# Patient Record
Sex: FEMALE | Race: WHITE | NOT HISPANIC OR LATINO | Employment: FULL TIME | URBAN - METROPOLITAN AREA
[De-identification: names, ages, dates, MRNs, and addresses within clinical notes are randomized per-mention and may not be internally consistent; named-entity substitution may affect disease eponyms.]

---

## 2017-03-10 ENCOUNTER — OFFICE VISIT (OUTPATIENT)
Dept: FAMILY MEDICINE | Facility: CLINIC | Age: 67
End: 2017-03-10
Payer: COMMERCIAL

## 2017-03-10 ENCOUNTER — RADIANT APPOINTMENT (OUTPATIENT)
Dept: GENERAL RADIOLOGY | Facility: CLINIC | Age: 67
End: 2017-03-10
Attending: PHYSICIAN ASSISTANT
Payer: COMMERCIAL

## 2017-03-10 VITALS
OXYGEN SATURATION: 95 % | WEIGHT: 158 LBS | TEMPERATURE: 100.5 F | BODY MASS INDEX: 29.83 KG/M2 | HEIGHT: 61 IN | DIASTOLIC BLOOD PRESSURE: 70 MMHG | SYSTOLIC BLOOD PRESSURE: 126 MMHG | HEART RATE: 112 BPM | RESPIRATION RATE: 20 BRPM

## 2017-03-10 DIAGNOSIS — R50.9 FEVER, UNSPECIFIED: ICD-10-CM

## 2017-03-10 DIAGNOSIS — R91.8 PULMONARY NODULES: ICD-10-CM

## 2017-03-10 DIAGNOSIS — J10.1 INFLUENZA B: Primary | ICD-10-CM

## 2017-03-10 LAB
FLUAV+FLUBV AG SPEC QL: ABNORMAL
FLUAV+FLUBV AG SPEC QL: NEGATIVE
SPECIMEN SOURCE: ABNORMAL

## 2017-03-10 PROCEDURE — 87804 INFLUENZA ASSAY W/OPTIC: CPT | Performed by: PHYSICIAN ASSISTANT

## 2017-03-10 PROCEDURE — 71020 XR CHEST 2 VW: CPT

## 2017-03-10 PROCEDURE — 99213 OFFICE O/P EST LOW 20 MIN: CPT | Performed by: PHYSICIAN ASSISTANT

## 2017-03-10 RX ORDER — BENZONATATE 100 MG/1
100 CAPSULE ORAL 3 TIMES DAILY PRN
Qty: 16 CAPSULE | Refills: 0 | Status: SHIPPED | OUTPATIENT
Start: 2017-03-10 | End: 2017-06-24

## 2017-03-10 RX ORDER — OSELTAMIVIR PHOSPHATE 75 MG/1
75 CAPSULE ORAL 2 TIMES DAILY
Qty: 10 CAPSULE | Refills: 0 | Status: SHIPPED | OUTPATIENT
Start: 2017-03-10 | End: 2017-06-24

## 2017-03-10 ASSESSMENT — ENCOUNTER SYMPTOMS
FEVER: 1
HEADACHES: 0
ABDOMINAL PAIN: 0
CHILLS: 1
MYALGIAS: 1
SHORTNESS OF BREATH: 0
NAUSEA: 0
VOMITING: 0
COUGH: 1
DIARRHEA: 0
FOCAL WEAKNESS: 0

## 2017-03-10 NOTE — PROGRESS NOTES
HPI    SUBJECTIVE:                                                    Marciano Akins is a 66 year old female who presents to clinic today for the following health issues:      RESPIRATORY SYMPTOMS      Duration: 2 days    Description  cough, congestion, fever, chills, myalgias, fatigue/malaise and hoarse voice    Severity: moderate    Accompanying signs and symptoms: None    History (predisposing factors):  none    Precipitating or alleviating factors: None    Therapies tried and outcome:  Sudafed- not effective, ibuprofen     Additional complaints: None    HPI additional notes:  Pt also c/o mid chest pain when coughing only, which is sore to the touch. Denies abd pain, N/V/D, sore throat, rash, SOB, and any other symptoms. No sick contacts.       Chart Review:  History   Smoking Status     Never Smoker   Smokeless Tobacco     Never Used     PHQ-9 SCORE 9/16/2014 10/30/2015   Total Score 3 -   Total Score - 3     No flowsheet data found.    Patient Active Problem List   Diagnosis     Health Care Home     Hyperlipidemia with target LDL less than 100     Pulmonary nodules     History reviewed. No pertinent past surgical history.  Problem list, Medication list, Allergies, Medical/Social/Surg hx reviewed in [employer], updated as appropriate.        Review of Systems   Constitutional: Positive for chills, fever and malaise/fatigue.   HENT: Positive for congestion.    Respiratory: Positive for cough. Negative for shortness of breath.    Cardiovascular: Positive for chest pain (w/ coughing).   Gastrointestinal: Negative for abdominal pain, diarrhea, nausea and vomiting.   Musculoskeletal: Positive for myalgias.   Skin: Negative for rash.   Neurological: Negative for focal weakness and headaches.   All other systems reviewed and are negative.        Physical Exam   Constitutional: She is oriented to person, place, and time and well-developed, well-nourished, and in no distress.   HENT:   Head: Normocephalic and atraumatic.  "  Right Ear: Tympanic membrane, external ear and ear canal normal.   Left Ear: Tympanic membrane, external ear and ear canal normal.   Nose: Mucosal edema present.   Mouth/Throat: Uvula is midline, oropharynx is clear and moist and mucous membranes are normal.   Cardiovascular: Normal rate, regular rhythm and normal heart sounds.    Pulmonary/Chest: Effort normal and breath sounds normal. She exhibits tenderness.       Musculoskeletal: Normal range of motion.   Neurological: She is alert and oriented to person, place, and time. Gait normal.   Skin: Skin is warm and dry.   Nursing note and vitals reviewed.    Vital Signs  /70  Pulse 112  Temp 100.5  F (38.1  C) (Tympanic)  Resp 20  Ht 5' 1.25\" (1.556 m)  Wt 158 lb (71.7 kg)  SpO2 95%  BMI 29.61 kg/m2   Body mass index is 29.61 kg/(m^2).    Diagnostic Test Results:  Influenza A/B Agn Specimen Nasal    Final 03/10/2017  3:46    Influenza A Negative  NEG  Final 03/10/2017  3:46    Influenza B  (A) NEG  Final 03/10/2017  3:46    Positive    Test results must be correlated with clinical data. If necessary, results    should be confirmed by a molecular assay or viral culture.        Study Result   CHEST TWO VIEWS 3/10/2017 4:07 PM      HISTORY: Fever.     COMPARISON: 11/26/2004         IMPRESSION: There is a faint 5-6 mm nodular density projected over the  right upper lobe and right seventh posterior rib. This is seen only on  the PA view and could be within the lung or the rib. This could be  further evaluated with CT, particularly if the patient is in a  high-risk category. Degenerative changes in thoracic spine. The  remainder the chest is negative.          ASSESSMENT/PLAN:                                                        ICD-10-CM    1. Influenza B J10.1 oseltamivir (TAMIFLU) 75 MG capsule     benzonatate (TESSALON) 100 MG capsule   2. Pulmonary nodules R91.8 CT Chest w/o Contrast   Lungs CTAB, nontoxic appearance. Temp 100.5 F. No acute " infiltrate on CXR. Flu positive, Rx Tamiflu & Tessalon perles.    5-6 mm possible pulmonary nodule (only seen on PA view) per radiologist. Pt informed of this, CT chest ordered. Advised regular monitoring for next 2 years.    I have discussed any lab or imaging results, the patient's diagnosis, and my plan of treatment with the patient and/or family. Patient is aware to come back in if with worsening symptoms or if no relief despite treatment plan.  Patient voiced understanding and had no further questions.       Follow Up: Return for Routine Visit.    OMAR Schaefer, PA-C  [facility]

## 2017-03-10 NOTE — NURSING NOTE
"Chief Complaint   Patient presents with     URI       Initial /70  Pulse 112  Temp 100.5  F (38.1  C) (Tympanic)  Resp 20  Ht 5' 1.25\" (1.556 m)  Wt 158 lb (71.7 kg)  SpO2 95%  BMI 29.61 kg/m2 Estimated body mass index is 29.61 kg/(m^2) as calculated from the following:    Height as of this encounter: 5' 1.25\" (1.556 m).    Weight as of this encounter: 158 lb (71.7 kg).  Medication Reconciliation: complete     Melinda Dillon CMA    "

## 2017-03-10 NOTE — PROGRESS NOTES
"  SUBJECTIVE:                                                    Marciano Akins is a 66 year old female who presents to clinic today for the following health issues:  {Provider please address medication reconciliation discrepancies--rooming staff please delete if no med/rec issues}    RESPIRATORY SYMPTOMS      Duration: 3 days    Description  cough, fever, chills, fatigue/malaise and hoarse voice    Severity: moderate    Accompanying signs and symptoms: None    History (predisposing factors):  none    Precipitating or alleviating factors: None    Therapies tried and outcome:  oral decongestant- not effective, ibuprofen       {additional problems for provider to add:263790}    Problem list and histories reviewed & adjusted, as indicated.  Additional history: {NONE - AS DOCUMENTED:321788::\"as documented\"}    {HIST REVIEW/ LINKS 2:572734}    Reviewed and updated as needed this visit by clinical staff  Tobacco  Allergies  Meds  Med Hx  Surg Hx  Fam Hx  Soc Hx      Reviewed and updated as needed this visit by Provider         {PROVIDER CHARTING PREFERENCE:895061}    "

## 2017-03-10 NOTE — PATIENT INSTRUCTIONS
Influenza  Influenza ( the flu ) is an infection that affects your respiratory tract (the mouth, nose, and lungs, and the passages between them). Unlike a cold, the flu can make you very ill. And it can lead to pneumonia, a serious lung infection. For some people, especially older adults, young children, and people with certain chronic conditions, the flu can have serious complications and even be fatal.  What Are the Risk Factors for the Flu?     Viruses that cause influenza spread through the air in droplets when someone who has the flu coughs, sneezes, laughs, or talks.   Anyone can get the flu. But you re more likely to become infected if you:    Have a weakened immune system.    Work in a health care setting where you may be exposed to flu germs.    Live or work with someone who has the flu.    Haven t received an annual flu shot.  How Does the Flu Spread?  The flu is caused by viruses. The viruses spread through the air in droplets when someone who has the flu coughs, sneezes, laughs, or talks. You can become infected when you inhale these viruses directly. You can also become infected when you touch a surface on which the droplets have landed and then transfer the germs to your eyes, nose, or mouth. Touching used tissues, or sharing utensils, drinking glasses, or a toothbrush with an infected person can expose you to flu viruses, too.  What Are the Symptoms of the Flu?  Flu symptoms tend to come on quickly and may last a few days to a few weeks. They include:    Fever usually higher than 101 F  (38.3 C) and chills    Sore throat and headache    Dry cough    Runny nose    Tiredness and weakness    Muscle aches  Factors That Can Make Flu Worse  For some people, the flu can be very serious. The risk of complications is greater for:    Children under age 5.    Adults 65 years of age and older.    People with a chronic illness, such as diabetes or heart, kidney, or lung disease.    People who live in a nursing  home or long-term care facility.   How Is the Flu Treated?  Influenza usually improves after 7 days or so. In some cases, your health care provider may prescribe an antiviral medication. This may help you get well sooner. For the medication to help, you need to take it as soon as possible (ideally within 48 hours) after your symptoms start. If you develop pneumonia or other serious illness, hospital care may be needed.  Easing Flu Symptoms    Drink lots of fluids such as water, juice, and warm soup. A good rule is to drink enough so that you urinate your normal amount.    Get plenty of rest.    Ask your health care provider what to take for fever and pain.    Call your provider if your fever rises over 101 F (38.3 C) or you become dizzy, lightheaded, or short of breath.  Taking Steps to Protect Others    Wash your hands often, especially after coughing or sneezing. Or, clean your hands with an alcohol-based hand  containing at least 60 percent alcohol.    Cough or sneeze into a tissue. Then throw the tissue away and wash your hands. If you don t have a tissue, cough and sneeze into the crook of your elbow.    Stay home until at least 24 hours after you no longer have a fever or chills. Be sure the fever isn t being hidden by fever-reducing medication.    Don t share food, utensils, drinking glasses, or a toothbrush with others.    Ask your health care provider if others in your household should receive antiviral medication to help them avoid infection.  How Can the Flu Be Prevented?    One of the best ways to avoid the flu is to get a flu vaccination each year. Viruses that cause the flu change from year to year. For that reason, doctors recommend getting the flu vaccine each year, as soon as it's available in your area. The vaccine may be given as a shot or as a nasal spray. Your health care provider can tell you which vaccine is right for you.    Wash your hands often. Frequent handwashing is a proven way  to help prevent infection.    Carry an alcohol-based hand gel containing at least 60 percent alcohol. Use it when you don t have access to soap and water. Then wash your hands as soon as you can.    Avoid touching your eyes, nose, and mouth.    At home and work, clean phones, computer keyboards, and toys often with disinfectant wipes.    If possible, avoid close contact with others who have the flu or symptoms of the flu.  Handwashing Tips  Handwashing is one of the best ways to prevent many common infections. If you re caring for or visiting someone with the flu, wash your hands each time you enter and leave the room. Follow these steps:    Use warm water and plenty of soap. Rub your hands together well.    Clean the whole hand, under your nails, between your fingers, and up the wrists.    Wash for at least 15 seconds.    Rinse, letting the water run down your fingers, not up your wrists.    Dry your hands well. Use a paper towel to turn off the faucet and open the door.  Using Alcohol-Based Hand   Alcohol-based hand  are also a good choice. Use them when you don t have access to soap and water. Follow these steps:    Squeeze about a tablespoon of gel into the palm of one hand.    Rub your hands together briskly, cleaning the backs of your hands, the palms, between your fingers, and up the wrists.    Rub until the gel is gone and your hands are completely dry.  Preventing Influenza in Healthcare Settings  The flu is a special concern for people in hospitals and long-term care facilities. To help prevent the spread of flu, many hospitals and nursing homes take these steps:    Health care providers wash their hands or use an alcohol-based hand  before and after treating each patient.    People with the flu have private rooms and bathrooms or share a room with someone with the same infection.    High-risk patients who don t have the flu are encouraged to get the flu and pneumonia  vaccines.    All health care workers are encouraged or required to get flu shots.        5969-7932 [employer]. [address] [place] [address], [place] [address]. All rights reserved. This information is not intended as a substitute for professional medical care. Always follow your healthcare professional's instructions.

## 2017-03-10 NOTE — MR AVS SNAPSHOT
After Visit Summary   3/10/2017    Marciano Akins    MRN: [id number]           Patient Information     Date Of Birth          1950        Visit Information        Provider Department      3/10/2017 3:20 PM Linette Price PA-C [facility]        Today's Diagnoses     Influenza B    -  1      Care Instructions      Influenza  Influenza ( the flu ) is an infection that affects your respiratory tract (the mouth, nose, and lungs, and the passages between them). Unlike a cold, the flu can make you very ill. And it can lead to pneumonia, a serious lung infection. For some people, especially older adults, young children, and people with certain chronic conditions, the flu can have serious complications and even be fatal.  What Are the Risk Factors for the Flu?     Viruses that cause influenza spread through the air in droplets when someone who has the flu coughs, sneezes, laughs, or talks.   Anyone can get the flu. But you re more likely to become infected if you:    Have a weakened immune system.    Work in a health care setting where you may be exposed to flu germs.    Live or work with someone who has the flu.    Haven t received an annual flu shot.  How Does the Flu Spread?  The flu is caused by viruses. The viruses spread through the air in droplets when someone who has the flu coughs, sneezes, laughs, or talks. You can become infected when you inhale these viruses directly. You can also become infected when you touch a surface on which the droplets have landed and then transfer the germs to your eyes, nose, or mouth. Touching used tissues, or sharing utensils, drinking glasses, or a toothbrush with an infected person can expose you to flu viruses, too.  What Are the Symptoms of the Flu?  Flu symptoms tend to come on quickly and may last a few days to a few weeks. They include:    Fever usually higher than 101 F  (38.3 C) and chills    Sore throat and  headache    Dry cough    Runny nose    Tiredness and weakness    Muscle aches  Factors That Can Make Flu Worse  For some people, the flu can be very serious. The risk of complications is greater for:    Children under age 5.    Adults 65 years of age and older.    People with a chronic illness, such as diabetes or heart, kidney, or lung disease.    People who live in a nursing home or long-term care facility.   How Is the Flu Treated?  Influenza usually improves after 7 days or so. In some cases, your health care provider may prescribe an antiviral medication. This may help you get well sooner. For the medication to help, you need to take it as soon as possible (ideally within 48 hours) after your symptoms start. If you develop pneumonia or other serious illness, hospital care may be needed.  Easing Flu Symptoms    Drink lots of fluids such as water, juice, and warm soup. A good rule is to drink enough so that you urinate your normal amount.    Get plenty of rest.    Ask your health care provider what to take for fever and pain.    Call your provider if your fever rises over 101 F (38.3 C) or you become dizzy, lightheaded, or short of breath.  Taking Steps to Protect Others    Wash your hands often, especially after coughing or sneezing. Or, clean your hands with an alcohol-based hand  containing at least 60 percent alcohol.    Cough or sneeze into a tissue. Then throw the tissue away and wash your hands. If you don t have a tissue, cough and sneeze into the crook of your elbow.    Stay home until at least 24 hours after you no longer have a fever or chills. Be sure the fever isn t being hidden by fever-reducing medication.    Don t share food, utensils, drinking glasses, or a toothbrush with others.    Ask your health care provider if others in your household should receive antiviral medication to help them avoid infection.  How Can the Flu Be Prevented?    One of the best ways to avoid the flu is to get a  flu vaccination each year. Viruses that cause the flu change from year to year. For that reason, doctors recommend getting the flu vaccine each year, as soon as it's available in your area. The vaccine may be given as a shot or as a nasal spray. Your health care provider can tell you which vaccine is right for you.    Wash your hands often. Frequent handwashing is a proven way to help prevent infection.    Carry an alcohol-based hand gel containing at least 60 percent alcohol. Use it when you don t have access to soap and water. Then wash your hands as soon as you can.    Avoid touching your eyes, nose, and mouth.    At home and work, clean phones, computer keyboards, and toys often with disinfectant wipes.    If possible, avoid close contact with others who have the flu or symptoms of the flu.  Handwashing Tips  Handwashing is one of the best ways to prevent many common infections. If you re caring for or visiting someone with the flu, wash your hands each time you enter and leave the room. Follow these steps:    Use warm water and plenty of soap. Rub your hands together well.    Clean the whole hand, under your nails, between your fingers, and up the wrists.    Wash for at least 15 seconds.    Rinse, letting the water run down your fingers, not up your wrists.    Dry your hands well. Use a paper towel to turn off the faucet and open the door.  Using Alcohol-Based Hand   Alcohol-based hand  are also a good choice. Use them when you don t have access to soap and water. Follow these steps:    Squeeze about a tablespoon of gel into the palm of one hand.    Rub your hands together briskly, cleaning the backs of your hands, the palms, between your fingers, and up the wrists.    Rub until the gel is gone and your hands are completely dry.  Preventing Influenza in Healthcare Settings  The flu is a special concern for people in hospitals and long-term care facilities. To help prevent the spread of flu, many  "hospitals and nursing homes take these steps:    Health care providers wash their hands or use an alcohol-based hand  before and after treating each patient.    People with the flu have private rooms and bathrooms or share a room with someone with the same infection.    High-risk patients who don t have the flu are encouraged to get the flu and pneumonia vaccines.    All health care workers are encouraged or required to get flu shots.        [phone] [employer]. [address] [place] [address], [place] [address]. All rights reserved. This information is not intended as a substitute for professional medical care. Always follow your healthcare professional's instructions.              Follow-ups after your visit        Follow-up notes from your care team     Return for Routine Visit.      Who to contact     If you have questions or need follow up information about today's clinic visit or your schedule please contact [facility] directly at [phone].  Normal or non-critical lab and imaging results will be communicated to you by MyChart, letter or phone within 4 business days after the clinic has received the results. If you do not hear from us within 7 days, please contact the clinic through [employer] or phone. If you have a critical or abnormal lab result, we will notify you by phone as soon as possible.  Submit refill requests through [employer] or call your pharmacy and they will forward the refill request to us. Please allow 3 business days for your refill to be completed.          Additional Information About Your Visit        [employer] Information     [employer] lets you send messages to your doctor, view your test results, renew your prescriptions, schedule appointments and more. To sign up, go to [email or web address] . Click on \"Log in\" on the left side of the screen, which will take you to the Welcome page. Then click on \"Sign up Now\" on the right side of the page. " "    You will be asked to enter the access code listed below, as well as some personal information. Please follow the directions to create your username and password.     Your access code is: [email or web address]  Expires: 2017  4:21 PM     Your access code will  in 90 days. If you need help or a new code, please call your [facility] clinic or [phone].        Care EveryWhere ID     This is your Care EveryWhere ID. This could be used by other organizations to access your [facility] medical records  [id number]        Your Vitals Were     Pulse Temperature Respirations Height Pulse Oximetry BMI (Body Mass Index)    112 100.5  F (38.1  C) (Tympanic) 20 5' 1.25\" (1.556 m) 95% 29.61 kg/m2       Blood Pressure from Last 3 Encounters:   03/10/17 126/70   16 120/80   16 124/82    Weight from Last 3 Encounters:   03/10/17 158 lb (71.7 kg)   16 154 lb (69.9 kg)   16 158 lb 9.6 oz (71.9 kg)              We Performed the Following     Influenza A/B antigen          Today's Medication Changes          These changes are accurate as of: 3/10/17  4:21 PM.  If you have any questions, ask your nurse or doctor.               Start taking these medicines.        Dose/Directions    benzonatate 100 MG capsule   Commonly known as:  TESSALON   Used for:  Influenza B   Started by:  Linette Price PA-C        Dose:  100 mg   Take 1 capsule (100 mg) by mouth 3 times daily as needed for cough   Quantity:  16 capsule   Refills:  0       oseltamivir 75 MG capsule   Commonly known as:  TAMIFLU   Used for:  Influenza B   Started by:  Linette Price PA-C        Dose:  75 mg   Take 1 capsule (75 mg) by mouth 2 times daily   Quantity:  10 capsule   Refills:  0            Where to get your medicines      These medications were sent to [facility] [address] [place] [address] [facility] [address] [facility] [address]     Phone:  [phone]     benzonatate 100 MG capsule    oseltamivir 75 MG " capsule                Primary Care Provider Office Phone # Fax #    Mayur Hector [phone]       [facility] [address]  [facility] [address]        Thank you!     Thank you for choosing [facility]  for your care. Our goal is always to provide you with excellent care. Hearing back from our patients is one way we can continue to improve our services. Please take a few minutes to complete the written survey that you may receive in the mail after your visit with us. Thank you!             Your Updated Medication List - Protect others around you: Learn how to safely use, store and throw away your medicines at www.disposemymeds.org.          This list is accurate as of: 3/10/17  4:21 PM.  Always use your most recent med list.                   Brand Name Dispense Instructions for use    atorvastatin 20 MG tablet    LIPITOR    30 tablet    Take 1 tablet (20 mg) by mouth daily       benzonatate 100 MG capsule    TESSALON    16 capsule    Take 1 capsule (100 mg) by mouth 3 times daily as needed for cough       HYDROcodone-acetaminophen 5-325 MG per tablet    NORCO    30 tablet    Take 1 tablet by mouth every 6 hours as needed for moderate to severe pain 1-2 tabs q 4-6 hours prn pain       IBUPROFEN PO      Take 800 mg by mouth every 6 hours       oseltamivir 75 MG capsule    TAMIFLU    10 capsule    Take 1 capsule (75 mg) by mouth 2 times daily       vitamin D 2000 UNITS tablet     100 tablet    Take 2,000 Units by mouth daily Reported on 3/10/2017

## 2017-03-13 ENCOUNTER — OFFICE VISIT (OUTPATIENT)
Dept: FAMILY MEDICINE | Facility: CLINIC | Age: 67
End: 2017-03-13
Payer: COMMERCIAL

## 2017-03-13 ENCOUNTER — HOSPITAL ENCOUNTER (EMERGENCY)
Facility: CLINIC | Age: 67
Discharge: HOME | End: 2017-03-13
Attending: EMERGENCY MEDICINE | Admitting: EMERGENCY MEDICINE
Payer: COMMERCIAL

## 2017-03-13 ENCOUNTER — TELEPHONE (OUTPATIENT)
Dept: FAMILY MEDICINE | Facility: CLINIC | Age: 67
End: 2017-03-13

## 2017-03-13 ENCOUNTER — APPOINTMENT (OUTPATIENT)
Dept: GENERAL RADIOLOGY | Facility: CLINIC | Age: 67
End: 2017-03-13
Attending: EMERGENCY MEDICINE
Payer: COMMERCIAL

## 2017-03-13 VITALS
WEIGHT: 158 LBS | BODY MASS INDEX: 29.61 KG/M2 | SYSTOLIC BLOOD PRESSURE: 150 MMHG | RESPIRATION RATE: 15 BRPM | HEART RATE: 82 BPM | TEMPERATURE: 99.4 F | OXYGEN SATURATION: 99 % | DIASTOLIC BLOOD PRESSURE: 84 MMHG

## 2017-03-13 VITALS
SYSTOLIC BLOOD PRESSURE: 137 MMHG | HEART RATE: 76 BPM | BODY MASS INDEX: 29.61 KG/M2 | TEMPERATURE: 98.5 F | OXYGEN SATURATION: 98 % | DIASTOLIC BLOOD PRESSURE: 62 MMHG | WEIGHT: 158 LBS | RESPIRATION RATE: 30 BRPM

## 2017-03-13 DIAGNOSIS — J10.1 INFLUENZA B: ICD-10-CM

## 2017-03-13 DIAGNOSIS — R06.02 SOB (SHORTNESS OF BREATH): Primary | ICD-10-CM

## 2017-03-13 DIAGNOSIS — J11.1 INFLUENZA: ICD-10-CM

## 2017-03-13 DIAGNOSIS — J45.21 REACTIVE AIRWAY DISEASE, MILD INTERMITTENT, WITH ACUTE EXACERBATION: ICD-10-CM

## 2017-03-13 LAB
ANION GAP SERPL CALCULATED.3IONS-SCNC: 10 MMOL/L (ref 3–14)
BASE EXCESS BLDV CALC-SCNC: 1.5 MMOL/L
BASOPHILS # BLD AUTO: 0.2 10E9/L (ref 0–0.2)
BASOPHILS NFR BLD AUTO: 2.3 %
BUN SERPL-MCNC: 9 MG/DL (ref 7–30)
CALCIUM SERPL-MCNC: 8.3 MG/DL (ref 8.5–10.1)
CHLORIDE SERPL-SCNC: 105 MMOL/L (ref 94–109)
CO2 SERPL-SCNC: 24 MMOL/L (ref 20–32)
CREAT SERPL-MCNC: 0.58 MG/DL (ref 0.52–1.04)
DIFFERENTIAL METHOD BLD: NORMAL
EOSINOPHIL # BLD AUTO: 0 10E9/L (ref 0–0.7)
EOSINOPHIL NFR BLD AUTO: 0.3 %
ERYTHROCYTE [DISTWIDTH] IN BLOOD BY AUTOMATED COUNT: 13.2 % (ref 10–15)
GFR SERPL CREATININE-BSD FRML MDRD: ABNORMAL ML/MIN/1.7M2
GLUCOSE SERPL-MCNC: 121 MG/DL (ref 70–99)
HCO3 BLDV-SCNC: 24 MMOL/L (ref 21–28)
HCT VFR BLD AUTO: 41.8 % (ref 35–47)
HGB BLD-MCNC: 14.3 G/DL (ref 11.7–15.7)
IMM GRANULOCYTES # BLD: 0 10E9/L (ref 0–0.4)
IMM GRANULOCYTES NFR BLD: 0.1 %
LACTATE BLD-SCNC: 2.2 MMOL/L (ref 0.7–2.1)
LYMPHOCYTES # BLD AUTO: 2.8 10E9/L (ref 0.8–5.3)
LYMPHOCYTES NFR BLD AUTO: 39.9 %
MCH RBC QN AUTO: 29.4 PG (ref 26.5–33)
MCHC RBC AUTO-ENTMCNC: 34.2 G/DL (ref 31.5–36.5)
MCV RBC AUTO: 86 FL (ref 78–100)
MONOCYTES # BLD AUTO: 0.9 10E9/L (ref 0–1.3)
MONOCYTES NFR BLD AUTO: 12.6 %
NEUTROPHILS # BLD AUTO: 3.1 10E9/L (ref 1.6–8.3)
NEUTROPHILS NFR BLD AUTO: 44.8 %
NRBC # BLD AUTO: 0 10*3/UL
NRBC BLD AUTO-RTO: 0 /100
PCO2 BLDV: 29 MM HG (ref 40–50)
PH BLDV: 7.52 PH (ref 7.32–7.43)
PLATELET # BLD AUTO: 188 10E9/L (ref 150–450)
PO2 BLDV: 43 MM HG (ref 25–47)
POTASSIUM SERPL-SCNC: 3.3 MMOL/L (ref 3.4–5.3)
RBC # BLD AUTO: 4.87 10E12/L (ref 3.8–5.2)
SODIUM SERPL-SCNC: 139 MMOL/L (ref 133–144)
WBC # BLD AUTO: 7 10E9/L (ref 4–11)

## 2017-03-13 PROCEDURE — 25000132 ZZH RX MED GY IP 250 OP 250 PS 637: Performed by: EMERGENCY MEDICINE

## 2017-03-13 PROCEDURE — 94640 AIRWAY INHALATION TREATMENT: CPT

## 2017-03-13 PROCEDURE — 83605 ASSAY OF LACTIC ACID: CPT | Performed by: EMERGENCY MEDICINE

## 2017-03-13 PROCEDURE — 96360 HYDRATION IV INFUSION INIT: CPT

## 2017-03-13 PROCEDURE — 80048 BASIC METABOLIC PNL TOTAL CA: CPT | Performed by: EMERGENCY MEDICINE

## 2017-03-13 PROCEDURE — 25000128 H RX IP 250 OP 636: Performed by: EMERGENCY MEDICINE

## 2017-03-13 PROCEDURE — 96361 HYDRATE IV INFUSION ADD-ON: CPT

## 2017-03-13 PROCEDURE — 25000125 ZZHC RX 250: Performed by: EMERGENCY MEDICINE

## 2017-03-13 PROCEDURE — 99215 OFFICE O/P EST HI 40 MIN: CPT | Mod: 25 | Performed by: PHYSICIAN ASSISTANT

## 2017-03-13 PROCEDURE — 71020 XR CHEST 2 VW: CPT

## 2017-03-13 PROCEDURE — 94640 AIRWAY INHALATION TREATMENT: CPT | Performed by: PHYSICIAN ASSISTANT

## 2017-03-13 PROCEDURE — 99284 EMERGENCY DEPT VISIT MOD MDM: CPT | Mod: 25

## 2017-03-13 PROCEDURE — 85025 COMPLETE CBC W/AUTO DIFF WBC: CPT | Performed by: EMERGENCY MEDICINE

## 2017-03-13 PROCEDURE — 82803 BLOOD GASES ANY COMBINATION: CPT | Performed by: EMERGENCY MEDICINE

## 2017-03-13 RX ORDER — PREDNISONE 20 MG/1
TABLET ORAL
Qty: 10 TABLET | Refills: 0 | Status: SHIPPED | OUTPATIENT
Start: 2017-03-13 | End: 2017-06-24

## 2017-03-13 RX ORDER — ACETAMINOPHEN 325 MG/1
975 TABLET ORAL ONCE
Status: COMPLETED | OUTPATIENT
Start: 2017-03-13 | End: 2017-03-13

## 2017-03-13 RX ORDER — IBUPROFEN 600 MG/1
600 TABLET, FILM COATED ORAL ONCE
Status: COMPLETED | OUTPATIENT
Start: 2017-03-13 | End: 2017-03-13

## 2017-03-13 RX ORDER — IPRATROPIUM BROMIDE AND ALBUTEROL SULFATE 2.5; .5 MG/3ML; MG/3ML
3 SOLUTION RESPIRATORY (INHALATION) ONCE
Status: COMPLETED | OUTPATIENT
Start: 2017-03-13 | End: 2017-03-13

## 2017-03-13 RX ORDER — CODEINE PHOSPHATE AND GUAIFENESIN 10; 100 MG/5ML; MG/5ML
1 SOLUTION ORAL EVERY 4 HOURS PRN
Qty: 120 ML | Refills: 0 | Status: SHIPPED | OUTPATIENT
Start: 2017-03-13 | End: 2017-06-24

## 2017-03-13 RX ORDER — ALBUTEROL SULFATE 90 UG/1
2 AEROSOL, METERED RESPIRATORY (INHALATION) EVERY 4 HOURS PRN
Qty: 1 INHALER | Refills: 0 | Status: SHIPPED | OUTPATIENT
Start: 2017-03-13 | End: 2019-06-10

## 2017-03-13 RX ORDER — SODIUM CHLORIDE 9 MG/ML
1000 INJECTION, SOLUTION INTRAVENOUS CONTINUOUS
Status: DISCONTINUED | OUTPATIENT
Start: 2017-03-13 | End: 2017-03-13 | Stop reason: HOSPADM

## 2017-03-13 RX ADMIN — IPRATROPIUM BROMIDE AND ALBUTEROL SULFATE 3 ML: .5; 3 SOLUTION RESPIRATORY (INHALATION) at 11:32

## 2017-03-13 RX ADMIN — SODIUM CHLORIDE 1000 ML: 9 INJECTION, SOLUTION INTRAVENOUS at 11:45

## 2017-03-13 RX ADMIN — IBUPROFEN 600 MG: 600 TABLET ORAL at 11:44

## 2017-03-13 RX ADMIN — ACETAMINOPHEN 975 MG: 325 TABLET, FILM COATED ORAL at 11:44

## 2017-03-13 ASSESSMENT — ENCOUNTER SYMPTOMS
FEVER: 1
SHORTNESS OF BREATH: 1
COUGH: 1
APPETITE CHANGE: 1

## 2017-03-13 NOTE — LETTER
EMERGENCY DEPARTMENT  [address] [facility] [address]  [phone]    2017    Marciano Akins  [address]  [facility] [address]  [phone] (home)     : 1950      To Whom it may concern:    Marciano Akins was seen in our Emergency Department today, 2017.  I expect her condition to improve over the next 3 days.  She may return to work/school when improved.    Sincerely,        Jose Carlos Alexander Trigger

## 2017-03-13 NOTE — NURSING NOTE
"Chief Complaint   Patient presents with     URI       Initial /84 (BP Location: Right arm, Patient Position: Chair, Cuff Size: Adult Regular)  Pulse 82  Temp 99.4  F (37.4  C) (Tympanic)  Resp 15  Wt 158 lb (71.7 kg)  SpO2 99%  BMI 29.61 kg/m2 Estimated body mass index is 29.61 kg/(m^2) as calculated from the following:    Height as of 3/10/17: 5' 1.25\" (1.556 m).    Weight as of this encounter: 158 lb (71.7 kg).  Medication Reconciliation: complete    "

## 2017-03-13 NOTE — ED AVS SNAPSHOT
Emergency Department    [address] [place] [address]    Phone:  [phone]    Fax:  [phone]                                       Marciano Akins   MRN: [id number]    Department:   Emergency Department   Date of Visit:  3/13/2017           After Visit Summary Signature Page     I have received my discharge instructions, and my questions have been answered. I have discussed any challenges I see with this plan with the nurse or doctor.    ..........................................................................................................................................  Patient/Patient Representative Signature      ..........................................................................................................................................  Patient Representative Print Name and Relationship to Patient    ..................................................               ................................................  Date                                            Time    ..........................................................................................................................................  Reviewed by Signature/Title    ...................................................              ..............................................  Date                                                            Time

## 2017-03-13 NOTE — ED PROVIDER NOTES
History     Chief Complaint:  Shortness of Breath    HPI   Marciano Akins is a 66 year old female who presents with shortness of breath. The patient was diagnosed with influenza last week and feels shortness of breath today. The patient also has associated pain with cough and a fever of 100.4. The patient also reports a decrease in her appetite for the past 4 days. The patient notes feeling sick since Thursday.  She has pain with coughing but no other symptoms.    Allergies:  No known drug allergies.    Medications:    Tamiflu  Benzonatate  Atrovastatin  Ibuprofen  Hydrocodone-Acetaminophen  Cholecalciferol     Past Medical History:    Hyperlipidemia    Past Surgical History:    History reviewed. No pertinent past surgical history.    Family History:    History reviewed. No pertinent family history.    Social History:  Marital Status:   Presents to the ED alone  PCP: Mayur Hector     Review of Systems   Constitutional: Positive for appetite change (decrease) and fever.   Respiratory: Positive for cough and shortness of breath.    All other systems reviewed and are negative.    Physical Exam   First Vitals:  BP: 180/80 mmHg  Heart Rate: 76   Resp: 30  SpO2: 98% RA  Temp: 98.5  F (oral)       Physical Exam  General: Alert, interactive in mild distress  Head:  Scalp is atraumatic  Eyes:  The pupils are equal, round, and reactive to light    EOM's intact    No scleral icterus  ENT:      Nose:  The external nose is normal  Ears:  External ears are normal  Mouth/Throat: The oropharynx is normal    Mucus membranes are moist      Neck:  Normal range of motion.      There is no rigidity.    Trachea is in the midline         CV:  Regular rate and rhythm    No peripheral edema.    No murmur   Resp:  Coarse sounds are clear bilaterally    Tachypneic.    Non-labored, no retractions or accessory muscle use      GI:  Abdomen is soft, no distension, no tenderness.       MS:  Normal strength in all 4  extremities  Skin:  Warm and dry, No rash or lesions noted.  Neuro: Strength 5/5 x4.   Psych:  Awake. Alert.  Anxious affect.      Appropriate interactions.    Emergency Department Course   Imaging:  Radiographic findings were communicated with the patient who voiced understanding of the findings.      XR Chest 2 Views   Final Result   IMPRESSION: The lungs are clear. No focal pulmonary opacities. Heart   and mediastinum are unremarkable. No acute cardiopulmonary   abnormalities.      FALGUNI ROTHMAN MD          All Readings Per Radiology Unless Otherwise Noted.    Laboratory:  CBC: WBC 7.0, HGB 14.3, , WNL  BMP: K 3.3 (L), Glc 121 (H), Ca 8.3 (L), Rest WNL (Creatinine 0.58)  Blood gas venous and oxyhgb: pH 7.52 (H) PCO2 29 (L) PO2 43 Bicarbonate 24 Base Excess 1.5  Lactic Acid: 2.2 (H)     Interventions:  (1132) Albuterol-Ipratropium inhalation solution, 2.5 mg-0.5 mg/3 ml; 3 mL, inhalation  (1144) Tylenol, 975 mg, PO  (1144) Ibuprofen, 600 mg, PO  (1145) Normal Saline, 1 liter, IV bolus    ED Course:  Nursing notes and past medical history reviewed.   I performed a physical examination of the patient as documented above.  I explained the plan with the patient who consents to this.   The patient was sent for a chest x-ray while in the emergency department, findings above.   Blood was drawn from the patient. This was sent for laboratory testing, findings above.   Findings and plan explained to the patient. Patient discharged home with instructions regarding supportive care, medications, and reasons to return. The importance of close follow-up was reviewed. The patient was prescribed albuterol, prednisone, and Robitussin AC.     Impression & Plan    Medical Decision Making:  Marciano Akins is a 66 year old female who was seen and evaluated. She was recently diagnosed with influenza, is now having increasing cough. The above work up was performed here demonstrating respiratory alkalosis, no signs of pulmonary  infiltrate, no finding concerning for pulmonary embolism. I believe that she is likely experiencing reactive airway disease secondary to the influenza. There are no signs of septic shock or more concerning illness. After IV fluids and bronchodilators, the patient was feeling improved and I have discharged here with an albuterol inhaler as well as a burst of steroids and guaifenesin with codeine cough syrup. Patient will follow up with her primary care provider and return here if new symptoms develop.    Diagnosis:    ICD-10-CM    1. Influenza J11.1    2. Reactive airway disease, mild intermittent, with acute exacerbation J45.21        Disposition:   Discharge to home.     Discharge Medications:   Discharge Medication List as of 3/13/2017  1:30 PM      START taking these medications    Details   albuterol (ALBUTEROL) 108 (90 BASE) MCG/ACT Inhaler Inhale 2 puffs into the lungs every 4 hours as needed for shortness of breath / dyspnea, Disp-1 Inhaler, R-0, Local Print      predniSONE (DELTASONE) 20 MG tablet Take two tablets (= 40mg) each day for 5 (five) days, Disp-10 tablet, R-0, Local Print      guaiFENesin-codeine (ROBITUSSIN AC) 100-10 MG/5ML SOLN solution Take 5 mLs by mouth every 4 hours as needed for cough, Disp-120 mL, R-0, Local Print               IJanice, am serving as a scribe on 3/13/2017 at 11:20 AM to personally document services performed by Jose Carlos Shrestha MD, based on my observations and the provider's statements to me.       Jose Carlos Shrestha MD  03/13/17 1518

## 2017-03-13 NOTE — MR AVS SNAPSHOT
"              After Visit Summary   3/13/2017    Marciano Akins    MRN: [id number]           Patient Information     Date Of Birth          1950        Visit Information        Provider Department      3/13/2017 10:10 AM Shelley Stevenson PA-C [facility]        Today's Diagnoses     SOB (shortness of breath)    -  1    Influenza B           Follow-ups after your visit        Who to contact     If you have questions or need follow up information about today's clinic visit or your schedule please contact [facility] directly at [phone].  Normal or non-critical lab and imaging results will be communicated to you by Medicagohart, letter or phone within 4 business days after the clinic has received the results. If you do not hear from us within 7 days, please contact the clinic through Medicagohart or phone. If you have a critical or abnormal lab result, we will notify you by phone as soon as possible.  Submit refill requests through Leap or call your pharmacy and they will forward the refill request to us. Please allow 3 business days for your refill to be completed.          Additional Information About Your Visit        MyChart Information     Leap lets you send messages to your doctor, view your test results, renew your prescriptions, schedule appointments and more. To sign up, go to [email or web address] . Click on \"Log in\" on the left side of the screen, which will take you to the Welcome page. Then click on \"Sign up Now\" on the right side of the page.     You will be asked to enter the access code listed below, as well as some personal information. Please follow the directions to create your username and password.     Your access code is: [email or web address]  Expires: 2017  5:21 PM     Your access code will  in 90 days. If you need help or a new code, please call your [facility] or [phone].        Care EveryWhere ID     This is your " Care EveryWhere ID. This could be used by other organizations to access your [facility] medical records  [id number]        Your Vitals Were     Pulse Temperature Respirations Pulse Oximetry BMI (Body Mass Index)       82 99.4  F (37.4  C) (Tympanic) 15 99% 29.61 kg/m2        Blood Pressure from Last 3 Encounters:   03/13/17 137/62   03/13/17 150/84   03/10/17 126/70    Weight from Last 3 Encounters:   03/13/17 158 lb (71.7 kg)   03/13/17 158 lb (71.7 kg)   03/10/17 158 lb (71.7 kg)              We Performed the Following     ALBUTEROL/IPRATROPIUM 3ML NEB     INHALATION/NEBULIZER TREATMENT, INITIAL        Primary Care Provider Office Phone # Fax #    Mayur Hector [phone]       [facility] [address] [facility] [address]        Thank you!     Thank you for choosing [facility]  for your care. Our goal is always to provide you with excellent care. Hearing back from our patients is one way we can continue to improve our services. Please take a few minutes to complete the written survey that you may receive in the mail after your visit with us. Thank you!             Your Updated Medication List - Protect others around you: Learn how to safely use, store and throw away your medicines at www.disposemymeds.org.          This list is accurate as of: 3/13/17 11:05 AM.  Always use your most recent med list.                   Brand Name Dispense Instructions for use    atorvastatin 20 MG tablet    LIPITOR    30 tablet    Take 1 tablet (20 mg) by mouth daily       benzonatate 100 MG capsule    TESSALON    16 capsule    Take 1 capsule (100 mg) by mouth 3 times daily as needed for cough       HYDROcodone-acetaminophen 5-325 MG per tablet    NORCO    30 tablet    Take 1 tablet by mouth every 6 hours as needed for moderate to severe pain 1-2 tabs q 4-6 hours prn pain       IBUPROFEN PO      Take 800 mg by mouth every 6 hours       oseltamivir 75 MG  capsule    TAMIFLU    10 capsule    Take 1 capsule (75 mg) by mouth 2 times daily       vitamin D 2000 UNITS tablet     100 tablet    Take 2,000 Units by mouth daily Reported on 3/10/2017

## 2017-03-13 NOTE — TELEPHONE ENCOUNTER
Patient's  is able to drive her.  Offered UC or appt at 10:10 with toby.  She will wear a mask-I let MA's know to watch for her  Marsha Kam RN- Triage [employer]

## 2017-03-13 NOTE — ED AVS SNAPSHOT
Emergency Department    [address] [facility] [address]    Phone:  [phone]    Fax:  [phone]                                       Marciano Akins   MRN: [id number]    Department:   Emergency Department   Date of Visit:  3/13/2017           Patient Information     Date Of Birth          1950        Your diagnoses for this visit were:     Influenza     Reactive airway disease, mild intermittent, with acute exacerbation        You were seen by Jose Carlos Shrestha MD.      Follow-up Information     Follow up with Mayur Hector MD In 3 days.    Specialty:  Family Practice    Contact information:    [facility]  [address]  [facility] [id number] [phone]          Follow up with  Emergency Department.    Specialty:  EMERGENCY MEDICINE    Why:  As needed, If symptoms worsen    Contact information:    [address] [facility] [id number] [phone]        Discharge Instructions       Discharge Instructions  Influenza    You were diagnosed today with influenza or influenza like illness.  Influenza is a respiratory illness caused by influenza A or B viruses.  Influenza causes fever, headache, muscle aches, and fatigue.  These symptoms start one to four days after you have been around a person with this illness.  People with influenza commonly have a dry cough, sore throat, and a runny nose.   Influenza is spread through sneezing and coughing and can live on surfaces for several days.  It is usually contagious for 5 days but in some cases up to 10 days and often affects several family members. If you have a family member who is less than 2 years old, older than 65 years old, pregnant or has a serious medical condition, they should be seen right away by a physician to decide if they should take preventative medications.      Return to the Emergency Department if:    You have trouble breathing.    You develop pain in your  chest.    You have signs of being dehydrated, such as dizziness or unable to urinate at least three times daily.    You feel confused.    You cannot stop vomiting or you cannot drink enough fluids.    In children, you should seek help if the child has any of the above or if child:    Has blue or purplish skin color.    Is so irritable that he or she does not want to be held.    Does not have tears when crying (in infants) or does not urinate at least three times daily.    Does not wake up easily.    Follow-up with your doctor if you are not improving after 5 days.    What can I do to help myself?    Rest.    Fluids -- Drink hydrating solutions such as Gatorade  or Pedialyte  as often as you can. If you are drinking enough, you should pass urine at least every eight hours.    Tylenol  (acetaminophen) and Advil  (ibuprofen) can relieve fever, headache, and muscle aches. Do not give aspirin to children under 18 years old.     Antiviral treatment -- Antiviral medicines do not make the flu symptoms go away immediately.  They have only been shown to make the symptoms go away 12 to 24 hours sooner than they would without treatment.       Antibiotics -- Antibiotics are NOT useful for treating viral illnesses such as influenza. Antibiotics should only be used if there is a bacterial complication of the flu such as bacterial pneumonia, ear infection, or sinusitis.    Because you were diagnosed with a flu like illness you are very contagious.  This means you cannot work, attend school or  for at least 24 hours or until you no longer have a fever.  If you were given a prescription for medicine here today, be sure to read all of the information (including the package insert) that comes with your prescription.  This will include important information about the medicine, its side effects, and any warnings that you need to know about.  The pharmacist who fills the prescription can provide more information and answer  questions you may have about the medicine.  If you have questions or concerns that the pharmacist cannot address, please call or return to the Emergency Department.   Opioid Medication Information    Pain medications are among the most commonly prescribed medicines, so we are including this information for all our patients. If you did not receive pain medication or get a prescription for pain medicine, you can ignore it.     You may have been given a prescription for an opioid (narcotic) pain medicine and/or have received a pain medicine while here in the Emergency Department. These medicines can make you drowsy or impaired. You must not drive, operate dangerous equipment, or engage in any other dangerous activities while taking these medications. If you drive while taking these medications, you could be arrested for DUI, or driving under the influence. Do not drink any alcohol while you are taking these medications.     Opioid pain medications can cause addiction. If you have a history of chemical dependency of any type, you are at a higher risk of becoming addicted to pain medications.  Only take these prescribed medications to treat your pain when all other options have been tried. Take it for as short a time and as few doses as possible. Store your pain pills in a secure place, as they are frequently stolen and provide a dangerous opportunity for children or visitors in your house to start abusing these powerful medications. We will not replace any lost or stolen medicine.  As soon as your pain is better, you should flush all your remaining medication.     Many prescription pain medications contain Tylenol  (acetaminophen), including Vicodin , Tylenol #3 , Norco , Lortab , and Percocet .  You should not take any extra pills of Tylenol  if you are using these prescription medications or you can get very sick.  Do not ever take more than 3000 mg of acetaminophen in any 24 hour period.    All opioids tend to cause  constipation. Drink plenty of water and eat foods that have a lot of fiber, such as fruits, vegetables, prune juice, apple juice and high fiber cereal.  Take a laxative if you don t move your bowels at least every other day. Miralax , Milk of Magnesia, Colace , or Senna  can be used to keep you regular.      Remember that you can always come back to the Emergency Department if you are not able to see your regular doctor in the amount of time listed above, if you get any new symptoms, or if there is anything that worries you.      24 Hour Appointment Hotline       To make an appointment at any [facility], call [phone] ([phone]). If you don't have a family doctor or clinic, we will help you find one. [facility] clinics are conveniently located to serve the needs of you and your family.             Review of your medicines      START taking        Dose / Directions Last dose taken    albuterol 108 (90 BASE) MCG/ACT Inhaler   Commonly known as:  albuterol   Dose:  2 puff   Quantity:  1 Inhaler        Inhale 2 puffs into the lungs every 4 hours as needed for shortness of breath / dyspnea   Refills:  0        guaiFENesin-codeine 100-10 MG/5ML Soln solution   Commonly known as:  ROBITUSSIN AC   Dose:  1 tsp.   Quantity:  120 mL        Take 5 mLs by mouth every 4 hours as needed for cough   Refills:  0        predniSONE 20 MG tablet   Commonly known as:  DELTASONE   Quantity:  10 tablet        Take two tablets (= 40mg) each day for 5 (five) days   Refills:  0          Our records show that you are taking the medicines listed below. If these are incorrect, please call your family doctor or clinic.        Dose / Directions Last dose taken    atorvastatin 20 MG tablet   Commonly known as:  LIPITOR   Dose:  20 mg   Quantity:  30 tablet        Take 1 tablet (20 mg) by mouth daily   Refills:  1        benzonatate 100 MG capsule   Commonly known as:  TESSALON   Dose:  100 mg   Quantity:  16 capsule        Take 1  capsule (100 mg) by mouth 3 times daily as needed for cough   Refills:  0        HYDROcodone-acetaminophen 5-325 MG per tablet   Commonly known as:  NORCO   Dose:  1 tablet   Quantity:  30 tablet        Take 1 tablet by mouth every 6 hours as needed for moderate to severe pain 1-2 tabs q 4-6 hours prn pain   Refills:  0        IBUPROFEN PO   Dose:  800 mg        Take 800 mg by mouth every 6 hours   Refills:  0        oseltamivir 75 MG capsule   Commonly known as:  TAMIFLU   Dose:  75 mg   Quantity:  10 capsule        Take 1 capsule (75 mg) by mouth 2 times daily   Refills:  0        vitamin D 2000 UNITS tablet   Dose:  2000 Units   Quantity:  100 tablet        Take 2,000 Units by mouth daily Reported on 3/10/2017   Refills:  3                Prescriptions were sent or printed at these locations (3 Prescriptions)                   Other Prescriptions                Printed at Department/Unit printer (3 of 3)         albuterol (ALBUTEROL) 108 (90 BASE) MCG/ACT Inhaler               predniSONE (DELTASONE) 20 MG tablet               guaiFENesin-codeine (ROBITUSSIN AC) 100-10 MG/5ML SOLN solution                Procedures and tests performed during your visit     Basic metabolic panel    Blood gas venous    CBC with platelets differential    Lactic acid whole blood    XR Chest 2 Views      Orders Needing Specimen Collection     None      Pending Results     No orders found from 3/11/2017 to 3/14/2017.            Pending Culture Results     No orders found from 3/11/2017 to 3/14/2017.             Test Results from your hospital stay     3/13/2017 12:14 PM - Interface, [employer] Results      Component Results     Component Value Ref Range & Units Status    WBC 7.0 4.0 - 11.0 10e9/L Final    RBC Count 4.87 3.8 - 5.2 10e12/L Final    Hemoglobin 14.3 11.7 - 15.7 g/dL Final    Hematocrit 41.8 35.0 - 47.0 % Final    MCV 86 78 - 100 fl Final    MCH 29.4 26.5 - 33.0 pg Final    MCHC 34.2 31.5 - 36.5 g/dL Final    RDW 13.2 10.0 -  15.0 % Final    Platelet Count 188 150 - 450 10e9/L Final    Diff Method Automated Method  Final    % Neutrophils 44.8 % Final    % Lymphocytes 39.9 % Final    % Monocytes 12.6 % Final    % Eosinophils 0.3 % Final    % Basophils 2.3 % Final    % Immature Granulocytes 0.1 % Final    Nucleated RBCs 0 0 /100 Final    Absolute Neutrophil 3.1 1.6 - 8.3 10e9/L Final    Absolute Lymphocytes 2.8 0.8 - 5.3 10e9/L Final    Absolute Monocytes 0.9 0.0 - 1.3 10e9/L Final    Absolute Eosinophils 0.0 0.0 - 0.7 10e9/L Final    Absolute Basophils 0.2 0.0 - 0.2 10e9/L Final    Abs Immature Granulocytes 0.0 0 - 0.4 10e9/L Final    Absolute Nucleated RBC 0.0  Final         3/13/2017 11:58 AM - Interface, Flexilab Results      Component Results     Component Value Ref Range & Units Status    Sodium 139 133 - 144 mmol/L Final    Potassium 3.3 (L) 3.4 - 5.3 mmol/L Final    Chloride 105 94 - 109 mmol/L Final    Carbon Dioxide 24 20 - 32 mmol/L Final    Anion Gap 10 3 - 14 mmol/L Final    Glucose 121 (H) 70 - 99 mg/dL Final    Urea Nitrogen 9 7 - 30 mg/dL Final    Creatinine 0.58 0.52 - 1.04 mg/dL Final    GFR Estimate >90  Non  GFR Calc   >60 mL/min/1.7m2 Final    GFR Estimate If Black >90   GFR Calc   >60 mL/min/1.7m2 Final    Calcium 8.3 (L) 8.5 - 10.1 mg/dL Final         3/13/2017 11:55 AM - Interface, Flexilab Results      Component Results     Component Value Ref Range & Units Status    Ph Venous 7.52 (H) 7.32 - 7.43 pH Final    PCO2 Venous 29 (L) 40 - 50 mm Hg Final    PO2 Venous 43 25 - 47 mm Hg Final    Bicarbonate Venous 24 21 - 28 mmol/L Final    Base Excess Venous 1.5 mmol/L Final    Reference range:  -7.7 to 1.9         3/13/2017 11:48 AM - Interface, Flexilab Results      Component Results     Component Value Ref Range & Units Status    Lactic Acid 2.2 (H) 0.7 - 2.1 mmol/L Final         3/13/2017 12:48 PM - Interface, Radiant Ib      Narrative     XR CHEST 2 VW 3/13/2017 12:28 PM    HISTORY:  Cough.    COMPARISON: March 10, 2017.        Impression     IMPRESSION: The lungs are clear. No focal pulmonary opacities. Heart  and mediastinum are unremarkable. No acute cardiopulmonary  abnormalities.    FALGUNI ROTHMAN MD                Clinical Quality Measure: Blood Pressure Screening     Your blood pressure was checked while you were in the emergency department today. The last reading we obtained was  BP: 137/62 . Please read the guidelines below about what these numbers mean and what you should do about them.  If your systolic blood pressure (the top number) is less than 120 and your diastolic blood pressure (the bottom number) is less than 80, then your blood pressure is normal. There is nothing more that you need to do about it.  If your systolic blood pressure (the top number) is 120-139 or your diastolic blood pressure (the bottom number) is 80-89, your blood pressure may be higher than it should be. You should have your blood pressure rechecked within a year by a primary care provider.  If your systolic blood pressure (the top number) is 140 or greater or your diastolic blood pressure (the bottom number) is 90 or greater, you may have high blood pressure. High blood pressure is treatable, but if left untreated over time it can put you at risk for heart attack, stroke, or kidney failure. You should have your blood pressure rechecked by a primary care provider within the next 4 weeks.  If your provider in the emergency department today gave you specific instructions to follow-up with your doctor or provider even sooner than that, you should follow that instruction and not wait for up to 4 weeks for your follow-up visit.        Thank you for choosing [facility]       Thank you for choosing [facility] for your care. Our goal is always to provide you with excellent care. Hearing back from our patients is one way we can continue to improve our services. Please take a few minutes to complete the written survey that  "you may receive in the mail after you visit with us. Thank you!        [employer] Information     [employer] lets you send messages to your doctor, view your test results, renew your prescriptions, schedule appointments and more. To sign up, go to [email or web address] . Click on \"Log in\" on the left side of the screen, which will take you to the Welcome page. Then click on \"Sign up Now\" on the right side of the page.     You will be asked to enter the access code listed below, as well as some personal information. Please follow the directions to create your username and password.     Your access code is: [email or web address]  Expires: 2017  5:21 PM     Your access code will  in 90 days. If you need help or a new code, please call your [facility] clinic or [phone].        Care EveryWhere ID     This is your Care EveryWhere ID. This could be used by other organizations to access your [facility] medical records  [id number]        After Visit Summary       This is your record. Keep this with you and show to your community pharmacist(s) and doctor(s) at your next visit.                  "

## 2017-03-13 NOTE — PROGRESS NOTES
SUBJECTIVE:                                                    Marciano Akins is a 66 year old female who presents to clinic today for the following health issues:    ENT Symptoms             Symptoms: cc Present Absent Comment   Fever/Chills  X     Fatigue  X     Muscle Aches  X     Eye Irritation   X    Sneezing   X    Nasal Colten/Drg  X     Sinus Pressure/Pain   X    Loss of smell   X    Dental pain   X    Sore Throat   X    Swollen Glands   X    Ear Pain/Fullness   X    Cough  X     Wheeze  X     Chest Pain  X     Shortness of breath  X     Rash   X    Other   X      Symptom duration:  5 DAYS   Symptom severity:  MODERATE TO SEVERE   Treatments tried:  TAMIFLU   Contacts:  NONE         HPI additional notes:   Chief Complaint   Patient presents with     VAUGHN Tariq presents today with worsening cough and SOB. Patient dx influenza on 3/10/2017, started Tamiflu at that time.     ROS:  Skin: negative  Eyes: negative  Ears/Nose/Throat: negative  Respiratory: as above  Cardiovascular: negative  Gastrointestinal: negative  Genitourinary: negative  Musculoskeletal: negative  Neurologic: negative  Psychiatric: negative  Hematologic/Lymphatic/Immunologic: negative  Endocrine: negative    Chart Review:  History   Smoking Status     Never Smoker   Smokeless Tobacco     Never Used       Patient Active Problem List   Diagnosis     Health Care Home     Hyperlipidemia with target LDL less than 100     History reviewed. No pertinent past surgical history.  Problem list, Medication list, Allergies, Medical/Social/Surg hx reviewed in [employer], updated as appropriate.   OBJECTIVE:                                                    /84 (BP Location: Right arm, Patient Position: Chair, Cuff Size: Adult Regular)  Pulse 82  Temp 99.4  F (37.4  C) (Tympanic)  Resp 15  Wt 158 lb (71.7 kg)  SpO2 99%  BMI 29.61 kg/m2  Body mass index is 29.61 kg/(m^2).  - Patient pale and ill-appearing. Patient in slight tripod position, using  accessory muscles to breath, mildly tachypneic upon my arrival to exam room. Audible wheezing, patient able to speak in short, complete sentences.     Duoneb given. Repeat exam - Patient no longer using accessory muscles to breath. Patient intermittently tachypneic, still with audible wheezing. Patient still breathless when speaking. Able to speak in short sentences only.    Diagnostic test results: none     ASSESSMENT/PLAN:                                                          ICD-10-CM    1. SOB (shortness of breath) R06.02 ALBUTEROL/IPRATROPIUM 3ML NEB     INHALATION/NEBULIZER TREATMENT, INITIAL   2. Influenza B J10.1      Patient with known influenza B, on day 3 of Tamiflu and day 5 of symptoms; here with worsening SOB and dry cough. Patient quite tachypneic, using accessory muscles to breath, and audibly wheezing upon my arrival. Very little improvement after Duoneb treatment. Patient deteriorated quite rapidly again upon re-questioning therefore sent to ED for further evaluation and management. Patient non-hypoxic on RA, which is re-assuring; however high risk for acute respiratory distress so feel further management in ED is prudent. Patient sent to ED via private car in stable condition.    Follow up with ED immediately.    Shelley Stevenson PA-C  [facility]

## 2017-03-13 NOTE — TELEPHONE ENCOUNTER
Marciano was diagnosed with Influenza A on Friday. Rx: Tamiflu and pearls for cough.   She is calling today as she has not improved at all. No history of asthma but she sounds like it today on the phone. Very SOB on the phone and cough sounds tight.  Loud breathing on phone.  She is experiencing pain with coughing.  Cough is very dry and non productive-very tight.   She has been in bed all weekend  She has no appetite.  She is drinking fluids.   Thursday temp 100.5 and has had temp all weekend. At 3:00 am temp was 100.4  And she took ibuprofen.  Routing to Dr Hector-she sounds like she needs neb or inhaler or steroid. Do you have time to see her this am?  Rachel has an 11:00.  Patient lives across the street and can get here easily.  Marsha Kam RN- Triage [employer]

## 2017-03-14 ENCOUNTER — TELEPHONE (OUTPATIENT)
Dept: FAMILY MEDICINE | Facility: CLINIC | Age: 67
End: 2017-03-14

## 2017-03-14 PROBLEM — R91.8 PULMONARY NODULES: Status: ACTIVE | Noted: 2017-03-14

## 2017-03-14 NOTE — TELEPHONE ENCOUNTER
Patient was called with provider's message. Informed to call clinic if she is not feeling better with new medications prescribed in ER.

## 2017-03-14 NOTE — TELEPHONE ENCOUNTER
Patient was called with a staff message from Linette Price.     Please call patient and inform her she had a small pulmonary nodule on her chest X-ray from 3/10/17. I'd like to get a CT of her chest to further evaluate this and make sure there are no signs of malignancy. An order has been placed- please assist in scheduling this for patient.     This will need to be monitored periodically for the next 2 years with CT scans. If there is no change in the size of the nodule, she can then stop the periodic CT scans.     Thanks,   Linette Price PA-C              3/10/17 were preliminary results?    Patient was in the ER yesterday which showed final result: lungs are clear. No focal pulmonary opacities. Heart  and mediastinum are unremarkable. No acute cardiopulmonary abnormalities.     Does patient still need a CT? Will route to PCP.

## 2017-06-24 ENCOUNTER — OFFICE VISIT (OUTPATIENT)
Dept: FAMILY MEDICINE | Facility: CLINIC | Age: 67
End: 2017-06-24
Payer: COMMERCIAL

## 2017-06-24 VITALS
OXYGEN SATURATION: 98 % | HEART RATE: 68 BPM | BODY MASS INDEX: 28.34 KG/M2 | DIASTOLIC BLOOD PRESSURE: 76 MMHG | TEMPERATURE: 97.2 F | HEIGHT: 62 IN | SYSTOLIC BLOOD PRESSURE: 128 MMHG | WEIGHT: 154 LBS | RESPIRATION RATE: 16 BRPM

## 2017-06-24 DIAGNOSIS — Z00.00 ROUTINE MEDICAL EXAM: Primary | ICD-10-CM

## 2017-06-24 DIAGNOSIS — Z23 NEED FOR VACCINATION: ICD-10-CM

## 2017-06-24 DIAGNOSIS — E55.9 VITAMIN D DEFICIENCY: ICD-10-CM

## 2017-06-24 DIAGNOSIS — R91.8 PULMONARY NODULES: ICD-10-CM

## 2017-06-24 DIAGNOSIS — E78.5 HYPERLIPIDEMIA WITH TARGET LDL LESS THAN 100: ICD-10-CM

## 2017-06-24 LAB
ALBUMIN SERPL-MCNC: 3.9 G/DL (ref 3.4–5)
ALBUMIN UR-MCNC: ABNORMAL MG/DL
ALP SERPL-CCNC: 108 U/L (ref 40–150)
ALT SERPL W P-5'-P-CCNC: 18 U/L (ref 0–50)
AMORPH CRY #/AREA URNS HPF: ABNORMAL /HPF
ANION GAP SERPL CALCULATED.3IONS-SCNC: 7 MMOL/L (ref 3–14)
APPEARANCE UR: CLEAR
AST SERPL W P-5'-P-CCNC: 15 U/L (ref 0–45)
BASOPHILS # BLD AUTO: 0.1 10E9/L (ref 0–0.2)
BASOPHILS NFR BLD AUTO: 1.3 %
BILIRUB SERPL-MCNC: 0.7 MG/DL (ref 0.2–1.3)
BILIRUB UR QL STRIP: ABNORMAL
BUN SERPL-MCNC: 12 MG/DL (ref 7–30)
CALCIUM SERPL-MCNC: 8.7 MG/DL (ref 8.5–10.1)
CHLORIDE SERPL-SCNC: 107 MMOL/L (ref 94–109)
CHOLEST SERPL-MCNC: 151 MG/DL
CO2 SERPL-SCNC: 28 MMOL/L (ref 20–32)
COLOR UR AUTO: YELLOW
CREAT SERPL-MCNC: 0.72 MG/DL (ref 0.52–1.04)
DIFFERENTIAL METHOD BLD: NORMAL
EOSINOPHIL # BLD AUTO: 0.3 10E9/L (ref 0–0.7)
EOSINOPHIL NFR BLD AUTO: 4.2 %
ERYTHROCYTE [DISTWIDTH] IN BLOOD BY AUTOMATED COUNT: 13 % (ref 10–15)
GFR SERPL CREATININE-BSD FRML MDRD: 81 ML/MIN/1.7M2
GLUCOSE SERPL-MCNC: 89 MG/DL (ref 70–99)
GLUCOSE UR STRIP-MCNC: NEGATIVE MG/DL
HCT VFR BLD AUTO: 44.8 % (ref 35–47)
HDLC SERPL-MCNC: 55 MG/DL
HGB BLD-MCNC: 14.9 G/DL (ref 11.7–15.7)
HGB UR QL STRIP: ABNORMAL
KETONES UR STRIP-MCNC: NEGATIVE MG/DL
LDLC SERPL CALC-MCNC: 78 MG/DL
LEUKOCYTE ESTERASE UR QL STRIP: ABNORMAL
LYMPHOCYTES # BLD AUTO: 2.6 10E9/L (ref 0.8–5.3)
LYMPHOCYTES NFR BLD AUTO: 38.2 %
MCH RBC QN AUTO: 29.4 PG (ref 26.5–33)
MCHC RBC AUTO-ENTMCNC: 33.3 G/DL (ref 31.5–36.5)
MCV RBC AUTO: 89 FL (ref 78–100)
MONOCYTES # BLD AUTO: 0.6 10E9/L (ref 0–1.3)
MONOCYTES NFR BLD AUTO: 8.4 %
NEUTROPHILS # BLD AUTO: 3.3 10E9/L (ref 1.6–8.3)
NEUTROPHILS NFR BLD AUTO: 47.9 %
NITRATE UR QL: NEGATIVE
NON-SQ EPI CELLS #/AREA URNS LPF: ABNORMAL /LPF
NONHDLC SERPL-MCNC: 96 MG/DL
PH UR STRIP: 6.5 PH (ref 5–7)
PLATELET # BLD AUTO: 225 10E9/L (ref 150–450)
POTASSIUM SERPL-SCNC: 3.5 MMOL/L (ref 3.4–5.3)
PROT SERPL-MCNC: 7.9 G/DL (ref 6.8–8.8)
RBC # BLD AUTO: 5.06 10E12/L (ref 3.8–5.2)
RBC #/AREA URNS AUTO: ABNORMAL /HPF (ref 0–2)
SODIUM SERPL-SCNC: 142 MMOL/L (ref 133–144)
SP GR UR STRIP: 1.02 (ref 1–1.03)
TRIGL SERPL-MCNC: 91 MG/DL
URN SPEC COLLECT METH UR: ABNORMAL
UROBILINOGEN UR STRIP-ACNC: 1 EU/DL (ref 0.2–1)
WBC # BLD AUTO: 6.9 10E9/L (ref 4–11)
WBC #/AREA URNS AUTO: ABNORMAL /HPF (ref 0–2)

## 2017-06-24 PROCEDURE — 81001 URINALYSIS AUTO W/SCOPE: CPT | Performed by: FAMILY MEDICINE

## 2017-06-24 PROCEDURE — 80061 LIPID PANEL: CPT | Performed by: FAMILY MEDICINE

## 2017-06-24 PROCEDURE — 99397 PER PM REEVAL EST PAT 65+ YR: CPT | Mod: 25 | Performed by: FAMILY MEDICINE

## 2017-06-24 PROCEDURE — 80053 COMPREHEN METABOLIC PANEL: CPT | Performed by: FAMILY MEDICINE

## 2017-06-24 PROCEDURE — 85025 COMPLETE CBC W/AUTO DIFF WBC: CPT | Performed by: FAMILY MEDICINE

## 2017-06-24 PROCEDURE — 90732 PPSV23 VACC 2 YRS+ SUBQ/IM: CPT | Performed by: FAMILY MEDICINE

## 2017-06-24 PROCEDURE — 36415 COLL VENOUS BLD VENIPUNCTURE: CPT | Performed by: FAMILY MEDICINE

## 2017-06-24 PROCEDURE — 82306 VITAMIN D 25 HYDROXY: CPT | Performed by: FAMILY MEDICINE

## 2017-06-24 PROCEDURE — G0009 ADMIN PNEUMOCOCCAL VACCINE: HCPCS | Performed by: FAMILY MEDICINE

## 2017-06-24 NOTE — NURSING NOTE
Screening Questionnaire for Adult Immunization    Are you sick today?   No   Do you have allergies to medications, food, a vaccine component or latex?   No   Have you ever had a serious reaction after receiving a vaccination?   No   Do you have a long-term health problem with heart disease, lung disease, asthma, kidney disease, metabolic disease (e.g. diabetes), anemia, or other blood disorder?   No   Do you have cancer, leukemia, HIV/AIDS, or any other immune system problem?   No   In the past 3 months, have you taken medications that affect  your immune system, such as prednisone, other steroids, or anticancer drugs; drugs for the treatment of rheumatoid arthritis, Crohn s disease, or psoriasis; or have you had radiation treatments?   No   Have you had a seizure, or a brain or other nervous system problem?   No   During the past year, have you received a transfusion of blood or blood     products, or been given immune (gamma) globulin or antiviral drug?   No   For women: Are you pregnant or is there a chance you could become        pregnant during the next month?   No   Have you received any vaccinations in the past 4 weeks?   No     Immunization questionnaire answers were all negative.      MNVFC doesn't apply on this patient    Per orders of Dr. Hector, injection of [id number] given by Radha Pedroza. Patient instructed to remain in clinic for 20 minutes afterwards, and to report any adverse reaction to me immediately.       Screening performed by Radha Pedroza on 6/24/2017 at 9:48 AM.

## 2017-06-24 NOTE — LETTER
[facility]  [address]  [address]  [place] [facility] [id number] [phone]                                                                                                           Marciano Akins  [address]  [place] [address]    June 26, 2017      Dear Marciano,    The results of your recent tests were reviewed and are enclosed.     Your vitamin D level is low. Please start taking 2000 units of vitamin D in addition to any vitamin D you are now taking.  Repeat the test in 4 months.    The rest of your tests looked just fine and need no attention.      Thank you for choosing [facility].  We appreciate the opportunity to serve you and look forward to supporting your healthcare needs in the future.    If you have any questions or concerns, please call me or my staff at [phone].      Sincerely,    Mayur Hector MD    Results for orders placed or performed in visit on 06/24/17   UA with Microscopic   Result Value Ref Range    Color Urine Yellow     Appearance Urine Clear     Glucose Urine Negative NEG mg/dL    Bilirubin Urine (A) NEG     Small  This is an unconfirmed screening test result. A positive result may be false.      Ketones Urine Negative NEG mg/dL    Specific Gravity Urine 1.025 1.003 - 1.035    pH Urine 6.5 5.0 - 7.0 pH    Protein Albumin Urine Trace (A) NEG mg/dL    Urobilinogen Urine 1.0 0.2 - 1.0 EU/dL    Nitrite Urine Negative NEG    Blood Urine Small (A) NEG    Leukocyte Esterase Urine Trace (A) NEG    Source Midstream Urine     WBC Urine O - 2 0 - 2 /HPF    RBC Urine 2-5 (A) 0 - 2 /HPF    Squamous Epithelial /LPF Urine Many (A) FEW /LPF    Amorphous Crystals Moderate (A) NEG /HPF   CBC with platelets differential   Result Value Ref Range    WBC 6.9 4.0 - 11.0 10e9/L    RBC Count 5.06 3.8 - 5.2 10e12/L    Hemoglobin 14.9 11.7 - 15.7 g/dL    Hematocrit 44.8 35.0 - 47.0 %    MCV 89 78 - 100 fl    MCH  29.4 26.5 - 33.0 pg    MCHC 33.3 31.5 - 36.5 g/dL    RDW 13.0 10.0 - 15.0 %    Platelet Count 225 150 - 450 10e9/L    Diff Method Automated Method     % Neutrophils 47.9 %    % Lymphocytes 38.2 %    % Monocytes 8.4 %    % Eosinophils 4.2 %    % Basophils 1.3 %    Absolute Neutrophil 3.3 1.6 - 8.3 10e9/L    Absolute Lymphocytes 2.6 0.8 - 5.3 10e9/L    Absolute Monocytes 0.6 0.0 - 1.3 10e9/L    Absolute Eosinophils 0.3 0.0 - 0.7 10e9/L    Absolute Basophils 0.1 0.0 - 0.2 10e9/L   Comprehensive metabolic panel   Result Value Ref Range    Sodium 142 133 - 144 mmol/L    Potassium 3.5 3.4 - 5.3 mmol/L    Chloride 107 94 - 109 mmol/L    Carbon Dioxide 28 20 - 32 mmol/L    Anion Gap 7 3 - 14 mmol/L    Glucose 89 70 - 99 mg/dL    Urea Nitrogen 12 7 - 30 mg/dL    Creatinine 0.72 0.52 - 1.04 mg/dL    GFR Estimate 81 >60 mL/min/1.7m2    GFR Estimate If Black >90   GFR Calc   >60 mL/min/1.7m2    Calcium 8.7 8.5 - 10.1 mg/dL    Bilirubin Total 0.7 0.2 - 1.3 mg/dL    Albumin 3.9 3.4 - 5.0 g/dL    Protein Total 7.9 6.8 - 8.8 g/dL    Alkaline Phosphatase 108 40 - 150 U/L    ALT 18 0 - 50 U/L    AST 15 0 - 45 U/L   Lipid Profile   Result Value Ref Range    Cholesterol 151 <200 mg/dL    Triglycerides 91 <150 mg/dL    HDL Cholesterol 55 >49 mg/dL    LDL Cholesterol Calculated 78 <100 mg/dL    Non HDL Cholesterol 96 <130 mg/dL   Vitamin D Deficiency   Result Value Ref Range    Vitamin D Deficiency screening 17 (L) 20 - 75 ug/L

## 2017-06-24 NOTE — NURSING NOTE
"Chief Complaint   Patient presents with     Physical       Initial /76 (BP Location: Right arm, Patient Position: Chair, Cuff Size: Adult Regular)  Pulse 68  Temp 97.2  F (36.2  C) (Tympanic)  Resp 16  Ht 5' 2.25\" (1.581 m)  Wt 154 lb (69.9 kg)  SpO2 98%  BMI 27.94 kg/m2 Estimated body mass index is 27.94 kg/(m^2) as calculated from the following:    Height as of this encounter: 5' 2.25\" (1.581 m).    Weight as of this encounter: 154 lb (69.9 kg).  Medication Reconciliation: complete    "

## 2017-06-24 NOTE — PROGRESS NOTES
SUBJECTIVE:                                                            Marciano Akins is a 67 year old female who presents for Preventive Visit.      Are you in the first 12 months of your Medicare Part B coverage?  No    Healthy Habits:    Do you get at least three servings of calcium containing foods daily (dairy, green leafy vegetables, etc.)? Yes also takes supplement    Amount of exercise or daily activities, outside of work: none    Problems taking medications regularly No    Medication side effects: No    Have you had an eye exam in the past two years? yes    Do you see a dentist twice per year? yes    Do you have sleep apnea, excessive snoring or daytime drowsiness?no    COGNITIVE SCREEN  1) Repeat 3 items (Banana, Sunrise, Chair)    2) Clock draw: NORMAL  3) 3 item recall: Recalls 3 objects  Results: 3 items recalled: COGNITIVE IMPAIRMENT LESS LIKELY    Mini-CogTM Copyright S Ivon. Licensed by the author for use in [facility]; reprinted with permission ([email or web address]). All rights reserved.                Reviewed and updated as needed this visit by clinical staff  Tobacco  Allergies  Meds  Med Hx  Surg Hx  Fam Hx  Soc Hx        Reviewed and updated as needed this visit by Provider        Social History   Substance Use Topics     Smoking status: Never Smoker     Smokeless tobacco: Never Used     Alcohol use No       The patient does not drink >3 drinks per day nor >7 drinks per week.    Today's PHQ-2 Score:   PHQ-2 ( 1999 Pfizer) 6/24/2017 5/31/2016   Q1: Little interest or pleasure in doing things 0 0   Q2: Feeling down, depressed or hopeless 0 0   PHQ-2 Score 0 0       Do you feel safe in your environment - Yes    Do you have a Health Care Directive?: No: Advance care planning was reviewed with patient; patient declined at this time.    Current providers sharing in care for this patient include:   Patient Care Team:  Mayur Hector MD as PCP - General (Family  Practice)  Sara Estrada MD (Family Practice)      Hearing impairment: No    Ability to successfully perform activities of daily living: Yes, no assistance needed     Fall risk:       Home safety:  none identified      The following health maintenance items are reviewed in Epic and correct as of today:  Health Maintenance   Topic Date Due     COLONOSCOPY Q5 YR  10/20/2016     PNEUMOCOCCAL (2 of 2 - PPSV23) 10/30/2016     FALL RISK ASSESSMENT  05/31/2017     INFLUENZA VACCINE (SYSTEM ASSIGNED)  09/01/2017     MAMMO SCREEN Q2 YR (SYSTEM ASSIGNED)  11/11/2018     ADVANCE DIRECTIVE PLANNING Q5 YRS  02/18/2021     LIPID SCREEN Q5 YR FEMALE (SYSTEM ASSIGNED)  05/31/2021     TETANUS IMMUNIZATION (SYSTEM ASSIGNED)  10/30/2025     DEXA SCAN SCREENING (SYSTEM ASSIGNED)  Completed     HEPATITIS C SCREENING  Completed         Pneumonia Vaccine:Adults age 65+ who received Pneumovax (PPSV23) at 65 years or older: Should be given PCV13 > 1 year after their most recent PPSV23     ROS:  C: NEGATIVE for fever, chills, change in weight  I: NEGATIVE for worrisome rashes, moles or lesions  E: NEGATIVE for vision changes or irritation  E/M: NEGATIVE for ear, mouth and throat problems  R: NEGATIVE for significant cough or SOB  B: NEGATIVE for masses, tenderness or discharge  CV: NEGATIVE for chest pain, palpitations or peripheral edema  GI: NEGATIVE for nausea, abdominal pain, heartburn, or change in bowel habits  : NEGATIVE for frequency, dysuria, or hematuria  M: NEGATIVE for significant arthralgias or myalgia  N: NEGATIVE for weakness, dizziness or paresthesias  E: NEGATIVE for temperature intolerance, skin/hair changes  H: NEGATIVE for bleeding problems  P: NEGATIVE for changes in mood or affect    Problem list, Medication list, Allergies, and Medical/Social/Surgical histories reviewed in EPIC and updated as appropriate.  OBJECTIVE:                                                            There were no vitals taken  "for this visit. Estimated body mass index is 29.61 kg/(m^2) as calculated from the following:    Height as of 3/10/17: 5' 1.25\" (1.556 m).    Weight as of 3/13/17: 158 lb (71.7 kg).  EXAM:   GENERAL APPEARANCE: healthy, alert and no distress  EYES: Eyes grossly normal to inspection, PERRL and conjunctivae and sclerae normal  HENT: ear canals and TM's normal, nose and mouth without ulcers or lesions, oropharynx clear and oral mucous membranes moist  NECK: no adenopathy, no asymmetry, masses, or scars and thyroid normal to palpation  RESP: lungs clear to auscultation - no rales, rhonchi or wheezes  BREAST: normal without masses, tenderness or nipple discharge and no palpable axillary masses or adenopathy  CV: regular rate and rhythm, normal S1 S2, no S3 or S4, no murmur, click or rub, no peripheral edema and peripheral pulses strong  ABDOMEN: soft, nontender, no hepatosplenomegaly, no masses and bowel sounds normal  MS: no musculoskeletal defects are noted and gait is age appropriate without ataxia  SKIN: no suspicious lesions or rashes  NEURO: Normal strength and tone, sensory exam grossly normal, mentation intact and speech normal  PSYCH: mentation appears normal and affect normal/bright    ASSESSMENT / PLAN:                                                                ICD-10-CM    1. Routine medical exam Z00.00 PNEUMOVAX - MEDICARE     ADMIN 1st VACCINE     UA with Microscopic     CBC with platelets differential     Comprehensive metabolic panel   2. Hyperlipidemia with target LDL less than 100 E78.5 Lipid Profile   3. Pulmonary nodules R91.8    4. Vitamin D deficiency E55.9 Vitamin D Deficiency   5. Need for vaccination Z23 Pneumococcal vaccine 23 valent PPSV23  (Pneumovax) [28816]       End of Life Planning:  Patient currently has an advanced directive: No.  I have verified the patient's ablity to prepare an advanced directive/make health care decisions.  Literature was provided to assist patient in preparing " "an advanced directive.    COUNSELING:  Reviewed preventive health counseling, as reflected in patient instructions       Immunizations    Vaccinated for: Pneumococcal        BP Screening:   Last 3 BP Readings:    BP Readings from Last 3 Encounters:   06/24/17 128/76   03/13/17 137/62   03/13/17 150/84       The following was recommended to the patient:  Re-screen BP within a year and recommended lifestyle modifications    Estimated body mass index is 29.61 kg/(m^2) as calculated from the following:    Height as of 3/10/17: 5' 1.25\" (1.556 m).    Weight as of 3/13/17: 158 lb (71.7 kg).     reports that she has never smoked. She has never used smokeless tobacco.      Appropriate preventive services were discussed with this patient, including applicable screening as appropriate for cardiovascular disease, diabetes, osteopenia/osteoporosis, and glaucoma.  As appropriate for age/gender, discussed screening for colorectal cancer, prostate cancer, breast cancer, and cervical cancer. Checklist reviewing preventive services available has been given to the patient.    Reviewed patients plan of care and provided an AVS. The Basic Care Plan (routine screening as documented in Health Maintenance) for Marciano meets the Care Plan requirement. This Care Plan has been established and reviewed with the Patient.    Counseling Resources:  ATP IV Guidelines  Pooled Cohorts Equation Calculator  Breast Cancer Risk Calculator  FRAX Risk Assessment  ICSI Preventive Guidelines  Dietary Guidelines for Americans, 2010  USDA's MyPlate  ASA Prophylaxis  Lung CA Screening    Mayur Hector MD  [facility]  "

## 2017-06-24 NOTE — MR AVS SNAPSHOT
After Visit Summary   6/24/2017    Marciano Akins    MRN: [id number]           Patient Information     Date Of Birth          1950        Visit Information        Provider Department      6/24/2017 9:00 AM Mayur Hector MD [facility]        Today's Diagnoses     Routine medical exam    -  1    Hyperlipidemia with target LDL less than 100        Pulmonary nodules        Vitamin D deficiency        Need for vaccination          Care Instructions      Preventive Health Recommendations    Female Ages 65 +    Yearly exam:     See your health care provider every year in order to  o Review health changes.   o Discuss preventive care.    o Review your medicines if your doctor has prescribed any.      You no longer need a yearly Pap test unless you've had an abnormal Pap test in the past 10 years. If you have vaginal symptoms, such as bleeding or discharge, be sure to talk with your provider about a Pap test.      Every 1 to 2 years, have a mammogram.  If you are over 69, talk with your health care provider about whether or not you want to continue having screening mammograms.      Every 10 years, have a colonoscopy. Or, have a yearly FIT test (stool test). These exams will check for colon cancer.       Have a cholesterol test every 5 years, or more often if your doctor advises it.       Have a diabetes test (fasting glucose) every three years. If you are at risk for diabetes, you should have this test more often.       At age 65, have a bone density scan (DEXA) to check for osteoporosis (brittle bone disease).    Shots:    Get a flu shot each year.    Get a tetanus shot every 10 years.    Talk to your doctor about your pneumonia vaccines. There are now two you should receive - Pneumovax (PPSV 23) and Prevnar (PCV 13).    Talk to your doctor about the shingles vaccine.    Talk to your doctor about the hepatitis B vaccine.    Nutrition:     Eat at least 5 servings  "of fruits and vegetables each day.      Eat whole-grain bread, whole-wheat pasta and brown rice instead of white grains and rice.      Talk to your provider about Calcium and Vitamin D.     Lifestyle    Exercise at least 150 minutes a week (30 minutes a day, 5 days a week). This will help you control your weight and prevent disease.      Limit alcohol to one drink per day.      No smoking.       Wear sunscreen to prevent skin cancer.       See your dentist twice a year for an exam and cleaning.      See your eye doctor every 1 to 2 years to screen for conditions such as glaucoma, macular degeneration and cataracts.          Follow-ups after your visit        Who to contact     If you have questions or need follow up information about today's clinic visit or your schedule please contact [facility] directly at [phone].  Normal or non-critical lab and imaging results will be communicated to you by [employer], letter or phone within 4 business days after the clinic has received the results. If you do not hear from us within 7 days, please contact the clinic through Idomoohart or phone. If you have a critical or abnormal lab result, we will notify you by phone as soon as possible.  Submit refill requests through [employer] or call your pharmacy and they will forward the refill request to us. Please allow 3 business days for your refill to be completed.          Additional Information About Your Visit        [employer] Information     [employer] lets you send messages to your doctor, view your test results, renew your prescriptions, schedule appointments and more. To sign up, go to [email or web address] . Click on \"Log in\" on the left side of the screen, which will take you to the Welcome page. Then click on \"Sign up Now\" on the right side of the page.     You will be asked to enter the access code listed below, as well as some personal information. Please follow the directions to create your " "username and password.     Your access code is: [email or web address]  Expires: 2017 12:52 PM     Your access code will  in 90 days. If you need help or a new code, please call your [place] clinic or [phone].        Care EveryWhere ID     This is your Care EveryWhere ID. This could be used by other organizations to access your [place] medical records  [id number]        Your Vitals Were     Pulse Temperature Respirations Height Pulse Oximetry BMI (Body Mass Index)    68 97.2  F (36.2  C) (Tympanic) 16 5' 2.25\" (1.581 m) 98% 27.94 kg/m2       Blood Pressure from Last 3 Encounters:   17 128/76   17 137/62   17 150/84    Weight from Last 3 Encounters:   17 154 lb (69.9 kg)   17 158 lb (71.7 kg)   17 158 lb (71.7 kg)              We Performed the Following     ADMIN 1st VACCINE     CBC with platelets differential     Comprehensive metabolic panel     Lipid Profile     Pneumococcal vaccine 23 valent PPSV23  (Pneumovax) [74909]     PNEUMOVAX - MEDICARE     UA with Microscopic     Vitamin D Deficiency        Primary Care Provider Office Phone # Fax #    Mayur Hector [phone]       [facility] [address] [address]        Equal Access to Services     ANDRES SCOTT AH: Hadii aad ku hadasho Soomaali, waaxda luqadaha, qaybta kaalmada adeegyada, ayleen quiros hayjam newby. So [facility] [phone].    ATENCIÓN: Si habla español, tiene a palencia disposición servicios gratuitos de asistencia lingüística. Llame al [phone].    We comply with applicable federal civil rights laws and [place] laws. We do not discriminate on the basis of race, color, national origin, age, disability sex, sexual orientation or gender identity.            Thank you!     Thank you for choosing [facility]  for your care. Our goal is always to provide you with excellent care. Hearing back from our patients is one " way we can continue to improve our services. Please take a few minutes to complete the written survey that you may receive in the mail after your visit with us. Thank you!             Your Updated Medication List - Protect others around you: Learn how to safely use, store and throw away your medicines at www.disposemymeds.org.          This list is accurate as of: 6/24/17  1:14 PM.  Always use your most recent med list.                   Brand Name Dispense Instructions for use Diagnosis    albuterol 108 (90 BASE) MCG/ACT Inhaler    albuterol    1 Inhaler    Inhale 2 puffs into the lungs every 4 hours as needed for shortness of breath / dyspnea        atorvastatin 20 MG tablet    LIPITOR    30 tablet    Take 1 tablet (20 mg) by mouth daily    Mixed hyperlipidemia       IBUPROFEN PO      Take 800 mg by mouth every 6 hours        vitamin D 2000 UNITS tablet     100 tablet    Take 2,000 Units by mouth daily Reported on 3/10/2017    Unspecified vitamin D deficiency

## 2017-06-26 LAB — DEPRECATED CALCIDIOL+CALCIFEROL SERPL-MC: 17 UG/L (ref 20–75)

## 2017-11-17 ENCOUNTER — HOSPITAL ENCOUNTER (OUTPATIENT)
Dept: MAMMOGRAPHY | Facility: CLINIC | Age: 67
Discharge: HOME | End: 2017-11-17
Attending: FAMILY MEDICINE | Admitting: FAMILY MEDICINE
Payer: COMMERCIAL

## 2017-11-17 DIAGNOSIS — Z12.31 ENCOUNTER FOR SCREENING MAMMOGRAM FOR HIGH-RISK PATIENT: ICD-10-CM

## 2017-11-17 PROCEDURE — G0202 SCR MAMMO BI INCL CAD: HCPCS

## 2017-12-02 ENCOUNTER — APPOINTMENT (OUTPATIENT)
Dept: GENERAL RADIOLOGY | Facility: CLINIC | Age: 67
End: 2017-12-02
Attending: EMERGENCY MEDICINE
Payer: COMMERCIAL

## 2017-12-02 ENCOUNTER — HOSPITAL ENCOUNTER (EMERGENCY)
Facility: CLINIC | Age: 67
Discharge: HOME | End: 2017-12-02
Attending: EMERGENCY MEDICINE | Admitting: EMERGENCY MEDICINE
Payer: COMMERCIAL

## 2017-12-02 ENCOUNTER — APPOINTMENT (OUTPATIENT)
Dept: CT IMAGING | Facility: CLINIC | Age: 67
End: 2017-12-02
Attending: EMERGENCY MEDICINE
Payer: COMMERCIAL

## 2017-12-02 VITALS
OXYGEN SATURATION: 96 % | HEART RATE: 89 BPM | TEMPERATURE: 98.1 F | DIASTOLIC BLOOD PRESSURE: 63 MMHG | SYSTOLIC BLOOD PRESSURE: 151 MMHG | RESPIRATION RATE: 18 BRPM

## 2017-12-02 DIAGNOSIS — R51.9 ACUTE INTRACTABLE HEADACHE, UNSPECIFIED HEADACHE TYPE: ICD-10-CM

## 2017-12-02 DIAGNOSIS — I10 ESSENTIAL HYPERTENSION: ICD-10-CM

## 2017-12-02 LAB
ALBUMIN SERPL-MCNC: 3.7 G/DL (ref 3.4–5)
ALP SERPL-CCNC: 105 U/L (ref 40–150)
ALT SERPL W P-5'-P-CCNC: 17 U/L (ref 0–50)
ANION GAP SERPL CALCULATED.3IONS-SCNC: 8 MMOL/L (ref 3–14)
AST SERPL W P-5'-P-CCNC: 17 U/L (ref 0–45)
BASOPHILS # BLD AUTO: 0.1 10E9/L (ref 0–0.2)
BASOPHILS NFR BLD AUTO: 0.8 %
BILIRUB SERPL-MCNC: 0.4 MG/DL (ref 0.2–1.3)
BUN SERPL-MCNC: 16 MG/DL (ref 7–30)
CALCIUM SERPL-MCNC: 8.7 MG/DL (ref 8.5–10.1)
CHLORIDE SERPL-SCNC: 105 MMOL/L (ref 94–109)
CO2 SERPL-SCNC: 27 MMOL/L (ref 20–32)
CREAT SERPL-MCNC: 0.73 MG/DL (ref 0.52–1.04)
DIFFERENTIAL METHOD BLD: NORMAL
EOSINOPHIL # BLD AUTO: 0.2 10E9/L (ref 0–0.7)
EOSINOPHIL NFR BLD AUTO: 3 %
ERYTHROCYTE [DISTWIDTH] IN BLOOD BY AUTOMATED COUNT: 13 % (ref 10–15)
GFR SERPL CREATININE-BSD FRML MDRD: 79 ML/MIN/1.7M2
GLUCOSE SERPL-MCNC: 94 MG/DL (ref 70–99)
HCT VFR BLD AUTO: 43.7 % (ref 35–47)
HGB BLD-MCNC: 15 G/DL (ref 11.7–15.7)
IMM GRANULOCYTES # BLD: 0 10E9/L (ref 0–0.4)
IMM GRANULOCYTES NFR BLD: 0.1 %
INTERPRETATION ECG - MUSE: NORMAL
LYMPHOCYTES # BLD AUTO: 3.4 10E9/L (ref 0.8–5.3)
LYMPHOCYTES NFR BLD AUTO: 46.4 %
MCH RBC QN AUTO: 29.9 PG (ref 26.5–33)
MCHC RBC AUTO-ENTMCNC: 34.3 G/DL (ref 31.5–36.5)
MCV RBC AUTO: 87 FL (ref 78–100)
MONOCYTES # BLD AUTO: 0.8 10E9/L (ref 0–1.3)
MONOCYTES NFR BLD AUTO: 11 %
NEUTROPHILS # BLD AUTO: 2.8 10E9/L (ref 1.6–8.3)
NEUTROPHILS NFR BLD AUTO: 38.7 %
NRBC # BLD AUTO: 0 10*3/UL
NRBC BLD AUTO-RTO: 0 /100
PLATELET # BLD AUTO: 266 10E9/L (ref 150–450)
POTASSIUM SERPL-SCNC: 3.6 MMOL/L (ref 3.4–5.3)
PROT SERPL-MCNC: 7.7 G/DL (ref 6.8–8.8)
RBC # BLD AUTO: 5.02 10E12/L (ref 3.8–5.2)
SODIUM SERPL-SCNC: 140 MMOL/L (ref 133–144)
TROPONIN I SERPL-MCNC: <0.015 UG/L (ref 0–0.04)
WBC # BLD AUTO: 7.3 10E9/L (ref 4–11)

## 2017-12-02 PROCEDURE — 96374 THER/PROPH/DIAG INJ IV PUSH: CPT

## 2017-12-02 PROCEDURE — 71020 XR CHEST 2 VW: CPT

## 2017-12-02 PROCEDURE — 84484 ASSAY OF TROPONIN QUANT: CPT | Performed by: EMERGENCY MEDICINE

## 2017-12-02 PROCEDURE — 85025 COMPLETE CBC W/AUTO DIFF WBC: CPT | Performed by: EMERGENCY MEDICINE

## 2017-12-02 PROCEDURE — 25000128 H RX IP 250 OP 636: Performed by: EMERGENCY MEDICINE

## 2017-12-02 PROCEDURE — 99285 EMERGENCY DEPT VISIT HI MDM: CPT | Mod: 25

## 2017-12-02 PROCEDURE — 96375 TX/PRO/DX INJ NEW DRUG ADDON: CPT

## 2017-12-02 PROCEDURE — 93005 ELECTROCARDIOGRAM TRACING: CPT

## 2017-12-02 PROCEDURE — 96361 HYDRATE IV INFUSION ADD-ON: CPT

## 2017-12-02 PROCEDURE — 80053 COMPREHEN METABOLIC PANEL: CPT | Performed by: EMERGENCY MEDICINE

## 2017-12-02 PROCEDURE — 70450 CT HEAD/BRAIN W/O DYE: CPT

## 2017-12-02 RX ORDER — SODIUM CHLORIDE 9 MG/ML
1000 INJECTION, SOLUTION INTRAVENOUS CONTINUOUS
Status: DISCONTINUED | OUTPATIENT
Start: 2017-12-02 | End: 2017-12-02 | Stop reason: HOSPADM

## 2017-12-02 RX ORDER — DIPHENHYDRAMINE HYDROCHLORIDE 50 MG/ML
50 INJECTION INTRAMUSCULAR; INTRAVENOUS ONCE
Status: COMPLETED | OUTPATIENT
Start: 2017-12-02 | End: 2017-12-02

## 2017-12-02 RX ORDER — HYDRALAZINE HYDROCHLORIDE 20 MG/ML
10 INJECTION INTRAMUSCULAR; INTRAVENOUS ONCE
Status: COMPLETED | OUTPATIENT
Start: 2017-12-02 | End: 2017-12-02

## 2017-12-02 RX ADMIN — PROCHLORPERAZINE EDISYLATE 5 MG: 5 INJECTION INTRAMUSCULAR; INTRAVENOUS at 18:53

## 2017-12-02 RX ADMIN — HYDRALAZINE HYDROCHLORIDE 10 MG: 20 INJECTION INTRAMUSCULAR; INTRAVENOUS at 18:24

## 2017-12-02 RX ADMIN — SODIUM CHLORIDE 1000 ML: 9 INJECTION, SOLUTION INTRAVENOUS at 17:55

## 2017-12-02 RX ADMIN — DIPHENHYDRAMINE HYDROCHLORIDE 50 MG: 50 INJECTION, SOLUTION INTRAMUSCULAR; INTRAVENOUS at 18:50

## 2017-12-02 ASSESSMENT — ENCOUNTER SYMPTOMS
NUMBNESS: 0
NECK PAIN: 1
SHORTNESS OF BREATH: 0
HEADACHES: 1
WEAKNESS: 0
FEVER: 0

## 2017-12-02 NOTE — ED AVS SNAPSHOT
Emergency Department    [address] [facility] [address]    Phone:  [phone]    Fax:  [phone]                                       Marciano Akins   MRN: [id number]    Department:   Emergency Department   Date of Visit:  12/2/2017           Patient Information     Date Of Birth          1950        Your diagnoses for this visit were:     Essential hypertension     Acute intractable headache, unspecified headache type        You were seen by David Barajas MD.      Follow-up Information     Follow up with Mayur Hector MD In 2 days.    Specialty:  Family Practice    Contact information:    [address]  [facility] [id number] [phone]          Follow up with  Emergency Department Today.    Specialty:  EMERGENCY MEDICINE    Why:  If symptoms worsen    Contact information:    [address] [facility] [id number] [phone]        Discharge Instructions         High Blood Pressure, To Be Confirmed, No Treatment  Your blood pressure today was higher than normal. Sometimes anxiety or pain can cause a temporary rise in blood pressure. It later returns to normal. Blood pressure that is high only one time doesn t mean that you have high blood pressure (hypertension). High blood pressure is a chronic illness. But you should have your blood pressure measured again within the next few days to find out if it s still high.    A blood pressure reading is made up of two numbers: a higher number over a lower number. The top number is the systolic pressure. The bottom number is the diastolic pressure. A normal blood pressure is a systolic pressure of less than 120 over a diastolic pressure of less than 80. You will see your blood pressure readings written together. For example, a person with a systolic pressure of 118 and a diastolic pressure of 78 will have 118/78 written in the medical record.     High blood pressure is when either the top number is 140  or higher, or the bottom number is 90 or higher. This must be the result when taking your blood pressure a number of times.   The blood pressures between normal and high are called prehypertension. This is systolic pressure of 120 to 140 or diastolic pressure of 80 to 89. Prehypertension means you are at risk of getting high blood pressure. It's a warning sign. The information gives you a chance to  make lifestyle changes such as weight loss, exercise, and quitting smoking, that can keep your blood pressure from going higher. You should have your blood pressure checked regularly to be sure it isn t rising.  Home care  To track your blood pressure, your provider may ask you to come into the office at different times and on different days. If your healthcare provider asks you to check your readings at home, ask him or her what times of the day to test and for how many days. Before you leave the office, ask your provider to show you how to take your blood pressure and be sure to ask questions if you don't understand something.  Consider buying an automatic blood pressure monitor. Ask your provider for a recommendation. You can buy blood pressure monitors at most pharmacies.  The American Heart Association recommends the following guidelines for home blood pressure monitoring:    Don't smoke or drink coffee for 30 minutes before taking your blood pressure.    Go to the bathroom before the test.    Relax for 5 minutes before taking the measurement.    Sit with your back supported (don't sit on a couch or soft chair); keep your feet on the floor uncrossed. Place your arm on a solid flat surface (like a table) with the upper part of the arm at heart level. Place the middle of the cuff directly above the eye of the elbow. Check the monitor's instruction manual for an illustration.    Take multiple readings. When you measure, take 2 to 3 readings one minute apart and record all of the results.    Take your blood pressure at  the same time every day, or as your healthcare provider recommends.    Record the date, time, and blood pressure reading.    Take the record with you to your next medical appointment. If your blood pressure monitor has a built-in memory, simply take the monitor with you to your next appointment.    Call your provider if you have several high readings. Don't be frightened by a single high blood pressure reading, but if you get several high readings, check in with your healthcare provider.    Note: When blood pressure reaches a systolic (top number) of 180 or higher OR diastolic (bottom number) of 110 or higher, seek emergency medical treatment.  Follow-up care  Keep all of your follow up appointments. If your blood pressure is high (more than 120 over 80) on 2 out of 3 days, you will need to follow up with your healthcare provider for more evaluation and treatment.  Don t put this off! High blood pressure can be treated. High blood pressure that s not treated raises your risk for heart attack and stroke.  When to seek medical advice  Call your healthcare provider right away if any of these occur:    Blood pressure reaches a systolic (top number) of 180 or higher, OR diastolic (bottom number) of 110 or higher    Chest pain or shortness of breath    Severe headache    Throbbing or rushing sound in the ears    Nosebleed    Sudden severe pain in your belly (abdomen)    Extreme drowsiness, confusion, or fainting    Dizziness or dizziness with spinning sensation (vertigo)    Weakness of an arm or leg or one side of the face    You have problems speaking or seeing   Date Last Reviewed: 12/1/2016 2000-2017 [employer]. [address] [place] [address], [place] [address]. All rights reserved. This information is not intended as a substitute for professional medical care. Always follow your healthcare professional's instructions.          24 Hour Appointment Hotline       To make an appointment at any [facility],  call [phone] ([phone]). If you don't have a family doctor or clinic, we will help you find one. [facility] are conveniently located to serve the needs of you and your family.             Review of your medicines      Our records show that you are taking the medicines listed below. If these are incorrect, please call your family doctor or clinic.        Dose / Directions Last dose taken    albuterol 108 (90 BASE) MCG/ACT Inhaler   Commonly known as:  PROAIR HFA   Dose:  2 puff   Quantity:  1 Inhaler        Inhale 2 puffs into the lungs every 4 hours as needed for shortness of breath / dyspnea   Refills:  0        atorvastatin 20 MG tablet   Commonly known as:  LIPITOR   Quantity:  30 tablet        TAKE 1 TABLET (20 MG) BY MOUTH DAILY   Refills:  1        IBUPROFEN PO   Dose:  800 mg        Take 800 mg by mouth every 6 hours   Refills:  0        vitamin D 2000 UNITS tablet   Dose:  2000 Units   Quantity:  100 tablet        Take 2,000 Units by mouth daily Reported on 3/10/2017   Refills:  3                Procedures and tests performed during your visit     Blood pressure    CBC with platelets differential    Cardiac Continuous Monitoring    Comprehensive metabolic panel    EKG 12-lead, tracing only    Head CT w/o contrast    Peripheral IV: Standard    Pulse oximetry nursing    Troponin I    XR Chest 2 Views      Orders Needing Specimen Collection     None      Pending Results     No orders found from 11/30/2017 to 12/3/2017.            Pending Culture Results     No orders found from 11/30/2017 to 12/3/2017.            Pending Results Instructions     If you had any lab results that were not finalized at the time of your Discharge, you can call the ED Lab Result RN at [phone]. You will be contacted by this team for any positive Lab results or changes in treatment. The nurses are available 7 days a week from 10A to 6:30P.  You can leave a message 24 hours per day and they will return your  call.        Test Results From Your Hospital Stay        12/2/2017  6:04 PM      Component Results     Component Value Ref Range & Units Status    WBC 7.3 4.0 - 11.0 10e9/L Final    RBC Count 5.02 3.8 - 5.2 10e12/L Final    Hemoglobin 15.0 11.7 - 15.7 g/dL Final    Hematocrit 43.7 35.0 - 47.0 % Final    MCV 87 78 - 100 fl Final    MCH 29.9 26.5 - 33.0 pg Final    MCHC 34.3 31.5 - 36.5 g/dL Final    RDW 13.0 10.0 - 15.0 % Final    Platelet Count 266 150 - 450 10e9/L Final    Diff Method Automated Method  Final    % Neutrophils 38.7 % Final    % Lymphocytes 46.4 % Final    % Monocytes 11.0 % Final    % Eosinophils 3.0 % Final    % Basophils 0.8 % Final    % Immature Granulocytes 0.1 % Final    Nucleated RBCs 0 0 /100 Final    Absolute Neutrophil 2.8 1.6 - 8.3 10e9/L Final    Absolute Lymphocytes 3.4 0.8 - 5.3 10e9/L Final    Absolute Monocytes 0.8 0.0 - 1.3 10e9/L Final    Absolute Eosinophils 0.2 0.0 - 0.7 10e9/L Final    Absolute Basophils 0.1 0.0 - 0.2 10e9/L Final    Abs Immature Granulocytes 0.0 0 - 0.4 10e9/L Final    Absolute Nucleated RBC 0.0  Final         12/2/2017  6:29 PM      Component Results     Component Value Ref Range & Units Status    Sodium 140 133 - 144 mmol/L Final    Potassium 3.6 3.4 - 5.3 mmol/L Final    Chloride 105 94 - 109 mmol/L Final    Carbon Dioxide 27 20 - 32 mmol/L Final    Anion Gap 8 3 - 14 mmol/L Final    Glucose 94 70 - 99 mg/dL Final    Urea Nitrogen 16 7 - 30 mg/dL Final    Creatinine 0.73 0.52 - 1.04 mg/dL Final    GFR Estimate 79 >60 mL/min/1.7m2 Final    Non  GFR Calc    GFR Estimate If Black >90 >60 mL/min/1.7m2 Final    African American GFR Calc    Calcium 8.7 8.5 - 10.1 mg/dL Final    Bilirubin Total 0.4 0.2 - 1.3 mg/dL Final    Albumin 3.7 3.4 - 5.0 g/dL Final    Protein Total 7.7 6.8 - 8.8 g/dL Final    Alkaline Phosphatase 105 40 - 150 U/L Final    ALT 17 0 - 50 U/L Final    AST 17 0 - 45 U/L Final         12/2/2017  6:29 PM      Component Results      Component Value Ref Range & Units Status    Troponin I ES <0.015 0.000 - 0.045 ug/L Final    The 99th percentile for upper reference range is 0.045 ug/L.  Troponin values   in the range of 0.045 - 0.120 ug/L may be associated with risks of adverse   clinical events.           12/2/2017  7:12 PM      Narrative     XR CHEST 2 VW   12/2/2017 6:13 PM     HISTORY: Chest pain/Shortness of breath;     COMPARISON: None.    FINDINGS: The heart is negative.  The lungs are clear. The pulmonary  vasculature is normal.  The bones and soft tissues are unremarkable.        Impression     IMPRESSION: No active infiltrate.        CHARLIE COY MD         12/2/2017  7:11 PM      Narrative     CT HEAD W/O CONTRAST   12/2/2017 6:16 PM     HISTORY: HA, HTN;     TECHNIQUE:  Axial images of the head without IV contrast material.  Radiation dose for this scan was reduced using automated exposure  control, adjustment of the mA and/or kV according to patient size, or  iterative reconstruction technique.    COMPARISON: CT dated 2/3/2004    FINDINGS: The ventricles are normal in size, shape and configuration.  The brain parenchyma and subarachnoid spaces are normal. There is no  evidence of intracranial hemorrhage, mass, acute infarct or anomaly.  The visualized portions of the sinuses and mastoids appear normal.  There is no evidence of trauma.        Impression     IMPRESSION:  No acute pathology, no bleed, mass, or acute infarcts are  seen.    CHARLIE COY MD                Clinical Quality Measure: Blood Pressure Screening     Your blood pressure was checked while you were in the emergency department today. The last reading we obtained was  BP: 156/73 . Please read the guidelines below about what these numbers mean and what you should do about them.  If your systolic blood pressure (the top number) is less than 120 and your diastolic blood pressure (the bottom number) is less than 80, then your blood pressure is normal. There is nothing more  "that you need to do about it.  If your systolic blood pressure (the top number) is 120-139 or your diastolic blood pressure (the bottom number) is 80-89, your blood pressure may be higher than it should be. You should have your blood pressure rechecked within a year by a primary care provider.  If your systolic blood pressure (the top number) is 140 or greater or your diastolic blood pressure (the bottom number) is 90 or greater, you may have high blood pressure. High blood pressure is treatable, but if left untreated over time it can put you at risk for heart attack, stroke, or kidney failure. You should have your blood pressure rechecked by a primary care provider within the next 4 weeks.  If your provider in the emergency department today gave you specific instructions to follow-up with your doctor or provider even sooner than that, you should follow that instruction and not wait for up to 4 weeks for your follow-up visit.        Thank you for choosing [facility]       Thank you for choosing [facility] for your care. Our goal is always to provide you with excellent care. Hearing back from our patients is one way we can continue to improve our services. Please take a few minutes to complete the written survey that you may receive in the mail after you visit with us. Thank you!        [employer] Information     [employer] lets you send messages to your doctor, view your test results, renew your prescriptions, schedule appointments and more. To sign up, go to [email or web address] . Click on \"Log in\" on the left side of the screen, which will take you to the Welcome page. Then click on \"Sign up Now\" on the right side of the page.     You will be asked to enter the access code listed below, as well as some personal information. Please follow the directions to create your username and password.     Your access code is: [email or web address]  Expires: 3/2/2018  7:31 PM     Your access code will  in 90 days. If you need help or a " new code, please call your [facility] or [phone].        Care EveryWhere ID     This is your Care EveryWhere ID. This could be used by other organizations to access your [facility] medical records  [id number]        Equal Access to Services     ANDRES SCOTT : Yen Arnold, wasariahda luqadaha, qaybta kaalmada zita, ayleen newby. So [facility] [phone].    ATENCIÓN: Si habla español, tiene a palencia disposición servicios gratuitos de asistencia lingüística. Llame al [phone].    We comply with applicable federal civil rights laws and [place] laws. We do not discriminate on the basis of race, color, national origin, age, disability, sex, sexual orientation, or gender identity.            After Visit Summary       This is your record. Keep this with you and show to your community pharmacist(s) and doctor(s) at your next visit.

## 2017-12-02 NOTE — ED AVS SNAPSHOT
Emergency Department    [address] [place] [address]    Phone:  [phone]    Fax:  [phone]                                       Marciano Akins   MRN: [id number]    Department:   Emergency Department   Date of Visit:  12/2/2017           After Visit Summary Signature Page     I have received my discharge instructions, and my questions have been answered. I have discussed any challenges I see with this plan with the nurse or doctor.    ..........................................................................................................................................  Patient/Patient Representative Signature      ..........................................................................................................................................  Patient Representative Print Name and Relationship to Patient    ..................................................               ................................................  Date                                            Time    ..........................................................................................................................................  Reviewed by Signature/Title    ...................................................              ..............................................  Date                                                            Time

## 2017-12-02 NOTE — ED PROVIDER NOTES
"  History     Chief Complaint:  Hypertension     HPI   Marciano Akins is a 67 year old female with a history of hyperlipidemia who presents to the Emergency Department for evaluation of hypertension. The patient was at the pharmacy today where they found her BP to be 190-200/ and advised her to be seen in the ED for further evaluation. On presentation the patient reports neck pain and headache over the past few days which she thought was secondary to sleeping. Yesterday while at work the patient had a \"sharp pain\" on the right side of her head, followed by four hours of blurred vision. The patient experienced another right sided headache and blurred vision with associated nausea today. She has been taking Tylenol and Ibuprofen without and relief. The patient denies any fever, chest pain, shortness of breath, numbness or weakness.     Cardiac Risk Factors:  History of hypertension - no  History of hyperlipidemia - yes  History of diabetes - no  History of smoking - no  Family history of heart complications - no    Allergies:  No known drug allergies    Medications:    Lipitor  Albuterol inhaler  Vitamin D  Ibuprofen     Past Medical History:    Pulmonary nodules  Hyperlipidemia  Vitamin D deficiency  Osteopenia  Thyroid nodule    Past Surgical History:    Colonoscopy     Family History:    No past pertinent family history.    Social History:  Smoking Status: never smoker  Smokeless Tobacco: never used  Alcohol Use: no  Marital Status:   [2]     Review of Systems   Constitutional: Negative for fever.   Respiratory: Negative for shortness of breath.    Cardiovascular: Negative for chest pain.   Musculoskeletal: Positive for neck pain.   Neurological: Positive for headaches. Negative for weakness and numbness.   All other systems reviewed and are negative.    Physical Exam   First Vitals:  Patient Vitals for the past 24 hrs:   BP Temp Temp src Pulse Heart Rate Resp SpO2   12/02/17 1942 151/63 - - 89 - 18 " 96 %   12/02/17 1930 151/63 - - - 89 (!) 0 97 %   12/02/17 1915 156/73 - - - 92 14 98 %   12/02/17 1845 188/87 - - - 98 20 99 %   12/02/17 1830 178/82 - - - 85 14 97 %   12/02/17 1755 - - - 84 - - 97 %   12/02/17 1753 183/84 - - - - - -   12/02/17 1733 (!) 221/93 98.1  F (36.7  C) Oral 83 - 16 97 %     Physical Exam  Nursing note and vitals reviewed.  General: Oriented to person, place, and time. Appears well-developed and well-nourished.   Head: No signs of trauma.   Mouth/Throat: Oropharynx is clear and moist.   Eyes: Conjunctivae are normal. Pupils are equal, round, and reactive to light.   Neck: Normal range of motion. No nuchal rigidity.   Cardiovascular: Normal rate and regular rhythm.    Respiratory: Effort normal and breath sounds normal. No respiratory distress.   Abdominal: Soft. There is no tenderness. There is no guarding.   Musculoskeletal: Normal range of motion. no edema.   Neurological: The patient is alert and oriented to person, place, and time.  PERRLA, EOMI, visual fields intact, strength in upper/lower extremities normal and symmetrical.   Sensation normal. Speech normal  GCS eye sub score is 4. GCS verbal sub score is 5. GCS motor sub score is 6.   Skin: Skin is warm and dry. No rash noted.   Psychiatric: normal mood and affect. behavior is normal.     Emergency Department Course   ECG:  Indication: hypertension   Time: 1822  Vent. Rate 78 bpm. ID interval 180. QRS duration 82. QT/QTc 384/437. P-R-T axis 63 60 68.   normal sinus rhythm. Normal ECG. Read time: 1825.    Imaging:  Radiographic findings were communicated with the patient who voiced understanding of the findings.    Chest XR:  No active infiltrate. As per radiology.     CT Head without contrast:  No acute pathology, no bleed, mass, or acute infarcts are  seen. As per radiology.     Laboratory:  CBC: WBC: 7.3, HGB: 15.0, PLT: 266  CMP: o/w WNL (Creat 0.73, glucose 94)  Troponin: <0.015    Interventions:  1755 NS 1 L  IV  1824 Hydralazine 10 mg IV  1850 Benadryl 50 mg IV  1853 Compazine 5 mg IV    Emergency Department Course:  Nursing notes and vitals reviewed. I performed an exam of the patient as documented above.     EKG obtained in the ED, see results above.     The patient was sent for a CT head without contrast while here in the emergency department, findings above.    The patient was sent for a chest xray while here in the emergency department, findings above.    Blood drawn. This was sent to the lab for further testing, results above.    1841 I reassessed the patient.     1923 I reassessed the patient.     Findings and plan explained to the Patient. Patient discharged home with instructions regarding supportive care, medications, and reasons to return. The importance of close follow-up was reviewed.     Impression & Plan      Medical Decision Making:  Marciano Akins is a 67 year old female who presents for evaluation of elevated blood pressure and right sided headache.  There is no history of hypertension or headaches in the past.  I considered a broad differential diagnosis for this patient including tension, migraine, analgesic rebound, occipital neuralgia, etc.  I also considered other less common but serious causes considered included meningitis, encephalitis, subarachnoid bleed, temporal arteritis, stroke, tumor, etc. Labs, imaging, EKG were unremarkable. The workup here is negative and the patient does not have any signs of end-organ dysfunction.  There is no signs of hypertensive emergency or urgency.  Supportive outpatient management is therefore indicated with close follow-up of primary care physician for serial blood pressure monitoring. All questions were answered and she was discharged in improved condition.     Diagnosis:    ICD-10-CM    1. Essential hypertension I10    2. Acute intractable headache, unspecified headache type R51      Disposition:  discharged to home    I, Enriqueta Rm, am serving as a  scribe on 12/2/2017 at 5:33 PM to personally document services performed by David Barajas MD based on my observations and the provider's statements to me.     Enriqueta Rm  12/2/2017    EMERGENCY DEPARTMENT       David Barajas MD  12/03/17 0224

## 2017-12-03 NOTE — DISCHARGE INSTRUCTIONS
High Blood Pressure, To Be Confirmed, No Treatment  Your blood pressure today was higher than normal. Sometimes anxiety or pain can cause a temporary rise in blood pressure. It later returns to normal. Blood pressure that is high only one time doesn t mean that you have high blood pressure (hypertension). High blood pressure is a chronic illness. But you should have your blood pressure measured again within the next few days to find out if it s still high.    A blood pressure reading is made up of two numbers: a higher number over a lower number. The top number is the systolic pressure. The bottom number is the diastolic pressure. A normal blood pressure is a systolic pressure of less than 120 over a diastolic pressure of less than 80. You will see your blood pressure readings written together. For example, a person with a systolic pressure of 118 and a diastolic pressure of 78 will have 118/78 written in the medical record.     High blood pressure is when either the top number is 140 or higher, or the bottom number is 90 or higher. This must be the result when taking your blood pressure a number of times.   The blood pressures between normal and high are called prehypertension. This is systolic pressure of 120 to 140 or diastolic pressure of 80 to 89. Prehypertension means you are at risk of getting high blood pressure. It's a warning sign. The information gives you a chance to  make lifestyle changes such as weight loss, exercise, and quitting smoking, that can keep your blood pressure from going higher. You should have your blood pressure checked regularly to be sure it isn t rising.  Home care  To track your blood pressure, your provider may ask you to come into the office at different times and on different days. If your healthcare provider asks you to check your readings at home, ask him or her what times of the day to test and for how many days. Before you leave the office, ask your provider to show you how  to take your blood pressure and be sure to ask questions if you don't understand something.  Consider buying an automatic blood pressure monitor. Ask your provider for a recommendation. You can buy blood pressure monitors at most pharmacies.  The American Heart Association recommends the following guidelines for home blood pressure monitoring:    Don't smoke or drink coffee for 30 minutes before taking your blood pressure.    Go to the bathroom before the test.    Relax for 5 minutes before taking the measurement.    Sit with your back supported (don't sit on a couch or soft chair); keep your feet on the floor uncrossed. Place your arm on a solid flat surface (like a table) with the upper part of the arm at heart level. Place the middle of the cuff directly above the eye of the elbow. Check the monitor's instruction manual for an illustration.    Take multiple readings. When you measure, take 2 to 3 readings one minute apart and record all of the results.    Take your blood pressure at the same time every day, or as your healthcare provider recommends.    Record the date, time, and blood pressure reading.    Take the record with you to your next medical appointment. If your blood pressure monitor has a built-in memory, simply take the monitor with you to your next appointment.    Call your provider if you have several high readings. Don't be frightened by a single high blood pressure reading, but if you get several high readings, check in with your healthcare provider.    Note: When blood pressure reaches a systolic (top number) of 180 or higher OR diastolic (bottom number) of 110 or higher, seek emergency medical treatment.  Follow-up care  Keep all of your follow up appointments. If your blood pressure is high (more than 120 over 80) on 2 out of 3 days, you will need to follow up with your healthcare provider for more evaluation and treatment.  Don t put this off! High blood pressure can be treated. High blood  pressure that s not treated raises your risk for heart attack and stroke.  When to seek medical advice  Call your healthcare provider right away if any of these occur:    Blood pressure reaches a systolic (top number) of 180 or higher, OR diastolic (bottom number) of 110 or higher    Chest pain or shortness of breath    Severe headache    Throbbing or rushing sound in the ears    Nosebleed    Sudden severe pain in your belly (abdomen)    Extreme drowsiness, confusion, or fainting    Dizziness or dizziness with spinning sensation (vertigo)    Weakness of an arm or leg or one side of the face    You have problems speaking or seeing   Date Last Reviewed: 12/1/2016 2000-2017 [employer]. [address] [place] [address], [place] [address]. All rights reserved. This information is not intended as a substitute for professional medical care. Always follow your healthcare professional's instructions.

## 2017-12-04 ENCOUNTER — OFFICE VISIT (OUTPATIENT)
Dept: FAMILY MEDICINE | Facility: CLINIC | Age: 67
End: 2017-12-04
Payer: COMMERCIAL

## 2017-12-04 VITALS
HEART RATE: 76 BPM | DIASTOLIC BLOOD PRESSURE: 76 MMHG | HEIGHT: 63 IN | SYSTOLIC BLOOD PRESSURE: 148 MMHG | RESPIRATION RATE: 14 BRPM | OXYGEN SATURATION: 97 % | BODY MASS INDEX: 28 KG/M2 | TEMPERATURE: 98.2 F | WEIGHT: 158 LBS

## 2017-12-04 DIAGNOSIS — Z13.220 LIPID SCREENING: ICD-10-CM

## 2017-12-04 DIAGNOSIS — I10 BENIGN ESSENTIAL HYPERTENSION: Primary | ICD-10-CM

## 2017-12-04 DIAGNOSIS — G44.209 TENSION HEADACHE: ICD-10-CM

## 2017-12-04 PROCEDURE — 82043 UR ALBUMIN QUANTITATIVE: CPT | Performed by: FAMILY MEDICINE

## 2017-12-04 PROCEDURE — 80061 LIPID PANEL: CPT | Performed by: FAMILY MEDICINE

## 2017-12-04 PROCEDURE — 36415 COLL VENOUS BLD VENIPUNCTURE: CPT | Performed by: FAMILY MEDICINE

## 2017-12-04 PROCEDURE — 84443 ASSAY THYROID STIM HORMONE: CPT | Performed by: FAMILY MEDICINE

## 2017-12-04 PROCEDURE — 99214 OFFICE O/P EST MOD 30 MIN: CPT | Performed by: FAMILY MEDICINE

## 2017-12-04 RX ORDER — LISINOPRIL 10 MG/1
10 TABLET ORAL DAILY
Qty: 30 TABLET | Refills: 1 | Status: SHIPPED | OUTPATIENT
Start: 2017-12-04 | End: 2017-12-23

## 2017-12-04 NOTE — PATIENT INSTRUCTIONS
"  * Tension Headache    Muscle Tension Headache (also called \"stress headache\") is a very common cause of head pain. Under stress, some people tense the muscles of their shoulder, neck and scalp without knowing it. If this lasts long enough, a headache can occur. These headaches can be very painful and last for hours or even days.  Home Care:    If you were given pain medicine for this headache, do not drive yourself home. Arrange for a ride, instead. When you get home, try to sleep. You should feel much better when you wake up.    Heat to the back of your neck may relieve neck spasm.    Drink only clear liquids or eat a very light diet to avoid nausea/vomiting until symptoms improve.  Preventing Future Headaches    Identify the sources of stress in your life. These may not be obvious! Learn new ways to handle your stress, such as regular exercise, biofeedback, self-hypnosis and meditation. For more information about this, consult your doctor or go to a local bookstore and review the many books and tapes on this subject.    At the first sign of a tension headache, take time out if possible. Remove yourself from the stressful situation, find a quiet comfortable place to sit or lie down and let yourself relax. Heat and deep massage of the tight areas in the neck and shoulders may help reduce muscle spasm. Medicine, such as ibuprofen (Advil or Motrin) or a prescribed muscle relaxant may be helpful at this point.  Follow Up with your doctor if the headache is not better within the next 24 hours. If you have frequent headaches you should discuss a treatment plan with your primary care doctor. Ask if you can have medicine to take at home the next time you get a bad headache. This may avoid the need for a visit to the emergency department in the future. Poorly controlled chronic headaches may require a referral to a neurologist (headache specialist).  Call your Doctor Or Get Prompt Medical Attention if any of the following " occur:    Worsening of your head pain or no improvement within 24 hours    Repeated vomiting (unable to keep liquids down)    Fever of 100.4 F (38.0 C) or higher, or as directed by your healthcare provider    Stiff neck    Extreme drowsiness, confusion or fainting    Dizziness, vertigo (dizziness with spinning sensation)    Weakness of an arm or leg or one side of the face    Difficulty with speech or vision    3858-3468 [employer]. [address] [place] [address], [place] [address]. All rights reserved. This information is not intended as a substitute for professional medical care. Always follow your healthcare professional's instructions.  This information has been modified by your health care provider with permission from the publisher.        Self-Care for Headaches  Most headaches aren't serious and can be relieved with self-care. But some headaches may be a sign of another health problem like eye trouble or high blood pressure. To find the best treatment, learn what kind of headaches you get. For tension headaches, self-care will usually help. To treat migraines, ask your healthcare provider for advice. It is also possible to get both tension and migraine headaches. Self-care involves relieving the pain and avoiding headache  triggers  if you can.    Ways to reduce pain and tension  Try these steps:    Apply a cold compress or ice pack to the pain site.    Drink fluids. If nausea makes it hard to drink, try sucking on ice.    Rest. Protect yourself from bright light and loud noises.    Calm your emotions by imagining a peaceful scene.    Massage tight neck, shoulder, and head muscles.    To relax muscles, soak in a hot bath or use a hot shower.  Use medicines  Aspirin or aspirin substitutes, such as ibuprofen and acetaminophen, can relieve headache. Remember: Never give aspirin to anyone 18 years old or younger because of the risk of developing Reye syndrome. Use pain medicines only when  "necessary.  Track your headaches  Keeping a headache diary can help you and your healthcare provider identify what's causing your headaches:    Note when each headache happens.    Identify your activities and the foods you've eaten 6 to 8 hours before the headache began.    Look for any trends or \"triggers.\"  Signs of tension headache  Any of the following can be signs:    Dull pain or feeling of pressure in a tight band around your head    Pain in your neck or shoulders    Headache without a definite beginning or end    Headache after an activity such as driving or working on a computer  Signs of migraine  Any of the following can be signs:    Throbbing pain on one or both sides of your head    Nausea or vomiting    Extreme sensitivity to light, sound, and smells    Bright spots, flashes, or other visual changes    Pain or nausea so severe that you can't continue your daily activities  Call your healthcare provider   If you have any of the following symptoms, contact your healthcare provider:    A headache that lingers after a recent injury or bump to the head.    A fever with a stiff neck or pain when you bend your head toward your chest.    A headache along with slurred speech, changes in your vision, or numbness or weakness in your arms or legs.    A headache for longer than 3 days.    Frequent headaches, especially in the morning.    Headaches with seizures     Seek immediate medical attention if you have a headache that you would call \"the worst headache you have ever had.\"   Date Last Reviewed: 10/4/2015    6875-1935 [employer]. [address] [place] [id number]. All rights reserved. This information is not intended as a substitute for professional medical care. Always follow your healthcare professional's instructions.        Controlling High Blood Pressure  High blood pressure (hypertension) is often called the silent killer. This is because many people who have it don t know it. High " blood pressure is defined as 140/90 mm Hg or higher. Know your blood pressure and remember to check it regularly. Doing so can save your life. Here are some things you can do to help control your blood pressure.    Choose heart-healthy foods    Select low-salt, low-fat foods. Limit sodium intake to 2,400 mg per day or the amount suggested by your healthcare provider.    Limit canned, dried, cured, packaged, and fast foods. These can contain a lot of salt.    Eat 8 to 10 servings of fruits and vegetables every day.    Choose lean meats, fish, or chicken.    Eat whole-grain pasta, brown rice, and beans.    Eat 2 to 3 servings of low-fat or fat-free dairy products.    Ask your doctor about the DASH eating plan. This plan helps reduce blood pressure.    When you go to a restaurant, ask that your meal be prepared with no added salt.  Maintain a healthy weight    Ask your healthcare provider how many calories to eat a day. Then stick to that number.    Ask your healthcare provider what weight range is healthiest for you. If you are overweight, a weight loss of only 3% to 5% of your body weight can help lower blood pressure. Generally, a good weight loss goal is to lose 10% of your body weight in a year.    Limit snacks and sweets.    Get regular exercise.  Get up and get active    Choose activities you enjoy. Find ones you can do with friends or family. This includes bicycling, dancing, walking, and jogging.    Park farther away from building entrances.    Use stairs instead of the elevator.    When you can, walk or bike instead of driving.    [place] leaves, garden, or do household repairs.    Be active at a moderate to vigorous level of physical activity for at least 40 minutes for a minimum of 3 to 4 days a week.   Manage stress    Make time to relax and enjoy life. Find time to laugh.    Communicate your concerns with your loved ones and your healthcare provider.    Visit with family and friends, and keep up with  hobbies.  Limit alcohol and quit smoking    Men should have no more than 2 drinks per day.    Women should have no more than 1 drink per day.    Talk with your healthcare provider about quitting smoking. Smoking significantly increases your risk for heart disease and stroke. Ask your healthcare provider about community smoking cessation programs and other options.  Medicines  If lifestyle changes aren t enough, your healthcare provider may prescribe high blood pressure medicine. Take all medicines as prescribed. If you have any questions about your medicines, ask your healthcare provider before stopping or changing them.   Date Last Reviewed: 4/27/2016 2000-2017 [employer]. [address] [place] [address]. All rights reserved. This information is not intended as a substitute for professional medical care. Always follow your healthcare professional's instructions.

## 2017-12-04 NOTE — MR AVS SNAPSHOT
"              After Visit Summary   12/4/2017    Marciano Akins    MRN: [id number]           Patient Information     Date Of Birth          1950        Visit Information        Provider Department      12/4/2017 4:10 PM Genny Cuevas,  [facility]        Today's Diagnoses     Benign essential hypertension    -  1    Lipid screening          Care Instructions      * Tension Headache    Muscle Tension Headache (also called \"stress headache\") is a very common cause of head pain. Under stress, some people tense the muscles of their shoulder, neck and scalp without knowing it. If this lasts long enough, a headache can occur. These headaches can be very painful and last for hours or even days.  Home Care:    If you were given pain medicine for this headache, do not drive yourself home. Arrange for a ride, instead. When you get home, try to sleep. You should feel much better when you wake up.    Heat to the back of your neck may relieve neck spasm.    Drink only clear liquids or eat a very light diet to avoid nausea/vomiting until symptoms improve.  Preventing Future Headaches    Identify the sources of stress in your life. These may not be obvious! Learn new ways to handle your stress, such as regular exercise, biofeedback, self-hypnosis and meditation. For more information about this, consult your doctor or go to a local bookstore and review the many books and tapes on this subject.    At the first sign of a tension headache, take time out if possible. Remove yourself from the stressful situation, find a quiet comfortable place to sit or lie down and let yourself relax. Heat and deep massage of the tight areas in the neck and shoulders may help reduce muscle spasm. Medicine, such as ibuprofen (Advil or Motrin) or a prescribed muscle relaxant may be helpful at this point.  Follow Up with your doctor if the headache is not better within the next 24 hours. If you have frequent " headaches you should discuss a treatment plan with your primary care doctor. Ask if you can have medicine to take at home the next time you get a bad headache. This may avoid the need for a visit to the emergency department in the future. Poorly controlled chronic headaches may require a referral to a neurologist (headache specialist).  Call your Doctor Or Get Prompt Medical Attention if any of the following occur:    Worsening of your head pain or no improvement within 24 hours    Repeated vomiting (unable to keep liquids down)    Fever of 100.4 F (38.0 C) or higher, or as directed by your healthcare provider    Stiff neck    Extreme drowsiness, confusion or fainting    Dizziness, vertigo (dizziness with spinning sensation)    Weakness of an arm or leg or one side of the face    Difficulty with speech or vision    8274-3545 [employer]. [address] [place] [address], [place] [address]. All rights reserved. This information is not intended as a substitute for professional medical care. Always follow your healthcare professional's instructions.  This information has been modified by your health care provider with permission from the publisher.        Self-Care for Headaches  Most headaches aren't serious and can be relieved with self-care. But some headaches may be a sign of another health problem like eye trouble or high blood pressure. To find the best treatment, learn what kind of headaches you get. For tension headaches, self-care will usually help. To treat migraines, ask your healthcare provider for advice. It is also possible to get both tension and migraine headaches. Self-care involves relieving the pain and avoiding headache  triggers  if you can.    Ways to reduce pain and tension  Try these steps:    Apply a cold compress or ice pack to the pain site.    Drink fluids. If nausea makes it hard to drink, try sucking on ice.    Rest. Protect yourself from bright light and loud noises.    Calm your  "emotions by imagining a peaceful scene.    Massage tight neck, shoulder, and head muscles.    To relax muscles, soak in a hot bath or use a hot shower.  Use medicines  Aspirin or aspirin substitutes, such as ibuprofen and acetaminophen, can relieve headache. Remember: Never give aspirin to anyone 18 years old or younger because of the risk of developing Reye syndrome. Use pain medicines only when necessary.  Track your headaches  Keeping a headache diary can help you and your healthcare provider identify what's causing your headaches:    Note when each headache happens.    Identify your activities and the foods you've eaten 6 to 8 hours before the headache began.    Look for any trends or \"triggers.\"  Signs of tension headache  Any of the following can be signs:    Dull pain or feeling of pressure in a tight band around your head    Pain in your neck or shoulders    Headache without a definite beginning or end    Headache after an activity such as driving or working on a computer  Signs of migraine  Any of the following can be signs:    Throbbing pain on one or both sides of your head    Nausea or vomiting    Extreme sensitivity to light, sound, and smells    Bright spots, flashes, or other visual changes    Pain or nausea so severe that you can't continue your daily activities  Call your healthcare provider   If you have any of the following symptoms, contact your healthcare provider:    A headache that lingers after a recent injury or bump to the head.    A fever with a stiff neck or pain when you bend your head toward your chest.    A headache along with slurred speech, changes in your vision, or numbness or weakness in your arms or legs.    A headache for longer than 3 days.    Frequent headaches, especially in the morning.    Headaches with seizures     Seek immediate medical attention if you have a headache that you would call \"the worst headache you have ever had.\"   Date Last Reviewed: 10/4/2015    8024-9673 " [employer]. [address] [place] [address]. All rights reserved. This information is not intended as a substitute for professional medical care. Always follow your healthcare professional's instructions.        Controlling High Blood Pressure  High blood pressure (hypertension) is often called the silent killer. This is because many people who have it don t know it. High blood pressure is defined as 140/90 mm Hg or higher. Know your blood pressure and remember to check it regularly. Doing so can save your life. Here are some things you can do to help control your blood pressure.    Choose heart-healthy foods    Select low-salt, low-fat foods. Limit sodium intake to 2,400 mg per day or the amount suggested by your healthcare provider.    Limit canned, dried, cured, packaged, and fast foods. These can contain a lot of salt.    Eat 8 to 10 servings of fruits and vegetables every day.    Choose lean meats, fish, or chicken.    Eat whole-grain pasta, brown rice, and beans.    Eat 2 to 3 servings of low-fat or fat-free dairy products.    Ask your doctor about the DASH eating plan. This plan helps reduce blood pressure.    When you go to a restaurant, ask that your meal be prepared with no added salt.  Maintain a healthy weight    Ask your healthcare provider how many calories to eat a day. Then stick to that number.    Ask your healthcare provider what weight range is healthiest for you. If you are overweight, a weight loss of only 3% to 5% of your body weight can help lower blood pressure. Generally, a good weight loss goal is to lose 10% of your body weight in a year.    Limit snacks and sweets.    Get regular exercise.  Get up and get active    Choose activities you enjoy. Find ones you can do with friends or family. This includes bicycling, dancing, walking, and jogging.    Park farther away from building entrances.    Use stairs instead of the elevator.    When you can, walk or bike instead  of driving.    [place] leaves, garden, or do household repairs.    Be active at a moderate to vigorous level of physical activity for at least 40 minutes for a minimum of 3 to 4 days a week.   Manage stress    Make time to relax and enjoy life. Find time to laugh.    Communicate your concerns with your loved ones and your healthcare provider.    Visit with family and friends, and keep up with hobbies.  Limit alcohol and quit smoking    Men should have no more than 2 drinks per day.    Women should have no more than 1 drink per day.    Talk with your healthcare provider about quitting smoking. Smoking significantly increases your risk for heart disease and stroke. Ask your healthcare provider about community smoking cessation programs and other options.  Medicines  If lifestyle changes aren t enough, your healthcare provider may prescribe high blood pressure medicine. Take all medicines as prescribed. If you have any questions about your medicines, ask your healthcare provider before stopping or changing them.   Date Last Reviewed: 4/27/2016 2000-2017 [employer]. [address] [place] [address]. All rights reserved. This information is not intended as a substitute for professional medical care. Always follow your healthcare professional's instructions.                Follow-ups after your visit        Who to contact     If you have questions or need follow up information about today's clinic visit or your schedule please contact [facility] directly at [phone].  Normal or non-critical lab and imaging results will be communicated to you by MyChart, letter or phone within 4 business days after the clinic has received the results. If you do not hear from us within 7 days, please contact the clinic through MyChart or phone. If you have a critical or abnormal lab result, we will notify you by phone as soon as possible.  Submit refill requests through [employer] or  "call your pharmacy and they will forward the refill request to us. Please allow 3 business days for your refill to be completed.          Additional Information About Your Visit        [employer] Information     [employer] lets you send messages to your doctor, view your test results, renew your prescriptions, schedule appointments and more. To sign up, go to [email or web address] . Click on \"Log in\" on the left side of the screen, which will take you to the Welcome page. Then click on \"Sign up Now\" on the right side of the page.     You will be asked to enter the access code listed below, as well as some personal information. Please follow the directions to create your username and password.     Your access code is: [email or web address]  Expires: 3/2/2018  7:31 PM     Your access code will  in 90 days. If you need help or a new code, please call your [facility] clinic or [phone].        Care EveryWhere ID     This is your Care EveryWhere ID. This could be used by other organizations to access your [facility] medical records  [id number]        Your Vitals Were     Pulse Temperature Respirations Height Pulse Oximetry Breastfeeding?    76 98.2  F (36.8  C) (Tympanic) 14 5' 2.5\" (1.588 m) 97% No    BMI (Body Mass Index)                   28.44 kg/m2            Blood Pressure from Last 3 Encounters:   17 148/76   17 151/63   17 128/76    Weight from Last 3 Encounters:   17 158 lb (71.7 kg)   17 154 lb (69.9 kg)   17 158 lb (71.7 kg)              We Performed the Following     Albumin Random Urine Quantitative with Creat Ratio     Lipid Profile (Chol, Trig, HDL, LDL calc)     TSH with free T4 reflex          Today's Medication Changes          These changes are accurate as of: 17  4:20 PM.  If you have any questions, ask your nurse or doctor.               Start taking these medicines.        Dose/Directions    lisinopril 10 MG tablet   Commonly known as:  PRINIVIL/ZESTRIL   Used " for:  Benign essential hypertension   Started by:  Genny Cuevas DO        Dose:  10 mg   Take 1 tablet (10 mg) by mouth daily   Quantity:  30 tablet   Refills:  1            Where to get your medicines      These medications were sent to [facility] [address] [facility] - [place], [place] - [address]  [address], [facility] [address]     Phone:  [phone]     lisinopril 10 MG tablet                Primary Care Provider Office Phone # Fax #    Mayur Pan Hector [phone]       [address] [facility] [address]        Equal Access to Services     [facility]: Hadii aad ku hadasho Soomaali, waaxda luqadaha, qaybta kaalmada adeegyada, waxay carsonin hayaan lakia villareal . So [facility] [phone].    ATENCIÓN: Si habla español, tiene a palencia disposición servicios gratuitos de asistencia lingüística. Llame al [phone].    We comply with applicable federal civil rights laws and [place] laws. We do not discriminate on the basis of race, color, national origin, age, disability, sex, sexual orientation, or gender identity.            Thank you!     Thank you for choosing [facility]  for your care. Our goal is always to provide you with excellent care. Hearing back from our patients is one way we can continue to improve our services. Please take a few minutes to complete the written survey that you may receive in the mail after your visit with us. Thank you!             Your Updated Medication List - Protect others around you: Learn how to safely use, store and throw away your medicines at www.disposemymeds.org.          This list is accurate as of: 12/4/17  4:20 PM.  Always use your most recent med list.                   Brand Name Dispense Instructions for use Diagnosis    albuterol 108 (90 BASE) MCG/ACT Inhaler    PROAIR HFA    1 Inhaler    Inhale 2 puffs into the lungs every 4 hours as needed for shortness of breath / dyspnea        atorvastatin 20  MG tablet    LIPITOR    30 tablet    TAKE 1 TABLET (20 MG) BY MOUTH DAILY    Mixed hyperlipidemia       IBUPROFEN PO      Take 800 mg by mouth every 6 hours        lisinopril 10 MG tablet    PRINIVIL/ZESTRIL    30 tablet    Take 1 tablet (10 mg) by mouth daily    Benign essential hypertension       vitamin D 2000 UNITS tablet     100 tablet    Take 2,000 Units by mouth daily Reported on 3/10/2017    Unspecified vitamin D deficiency

## 2017-12-04 NOTE — NURSING NOTE
"Chief Complaint   Patient presents with     Er F/u     FSH        Initial /76 (BP Location: Left arm, Patient Position: Sitting, Cuff Size: Adult Regular)  Pulse 76  Temp 98.2  F (36.8  C) (Tympanic)  Resp 14  Ht 5' 2.5\" (1.588 m)  Wt 158 lb (71.7 kg)  SpO2 97%  Breastfeeding? No  BMI 28.44 kg/m2 Estimated body mass index is 28.44 kg/(m^2) as calculated from the following:    Height as of this encounter: 5' 2.5\" (1.588 m).    Weight as of this encounter: 158 lb (71.7 kg).  Medication Reconciliation: complete     Belkis Tejeda LPN  "

## 2017-12-04 NOTE — LETTER
"December 7, 2017      Marciano Covingtonjustin  [address]  [place] [address]        Dear ,    We are writing to inform you of your test results.    1.  CHOLESTEROL:  --As discussed during your clinic appointment, we are going to re-start your cholesterol medication (Atorvastatin 20 mg) and increase it slowly over time.    --We use a calculation recommended from the American College of Cardiology (the \"ASCVD\" risk calculation, \"ArtheroSclerotic Cardiovascular Disease\") before starting anyone on a cholesterol medication.  This measures the risk of getting a heart-attack/stroke in the next 10 years without taking a cholesterol medication, and your risk is 21.8% and a high-dose cholesterol medication is what's recommended (or the highest dose that you can tolerate).   --Cholesterol Results/Explanation:  Cholesterol: Desirable is less than 200, Borderline is 200-240  HDL (Good Cholesterol): Desirable is greater than 50.  Exercise helps to increase HDL.  LDL (Bad Cholesterol): Desirable is less than 130, Borderline 130-160  Triglycerides: Desirable is less than 200,  Borderline is 200-400.  Eating low-fat/healthy foods helps to lower your Triglycerides.    2.  THYROID:   --Your TSH (thyroid test) is NORMAL.    3.  MICRO-ALBUMIN:   --Your micro-albumin (urine protein test) is NORMAL    Resulted Orders   Lipid Profile (Chol, Trig, HDL, LDL calc)   Result Value Ref Range    Cholesterol 258 (H) <200 mg/dL      Comment:      Desirable:       <200 mg/dl    Triglycerides 108 <150 mg/dL      Comment:      Fasting specimen    HDL Cholesterol 49 (L) >49 mg/dL    LDL Cholesterol Calculated 187 (H) <100 mg/dL      Comment:      Above desirable:  100-129 mg/dl  Borderline High:  130-159 mg/dL  High:             160-189 mg/dL  Very high:       >189 mg/dl      Non HDL Cholesterol 209 (H) <130 mg/dL      Comment:      Above Desirable:  130-159 mg/dl  Borderline high:  160-189 mg/dl  High:             190-219 " mg/dl  Very high:       >219 mg/dl     Albumin Random Urine Quantitative with Creat Ratio   Result Value Ref Range    Creatinine Urine 163 mg/dL    Albumin Urine mg/L 27 mg/L    Albumin Urine mg/g Cr 16.81 0 - 25 mg/g Cr   TSH with free T4 reflex   Result Value Ref Range    TSH 2.11 0.40 - 4.00 mU/L       If you have any questions or concerns, please call the clinic at the number listed above.       Sincerely,        Genny Cuevas, DO

## 2017-12-04 NOTE — PROGRESS NOTES
"  SUBJECTIVE:   Marciano Akins is a 67 year old female who presents to clinic today for the following health issues:        ED/UC Followup:    Facility:  [facility]  Date of visit: 12/2/2017  Reason for visit: Elevated B/P/Headache, Blurred Vision, neck pain  Current Status: Continues to have pain, blurred vision and fatigue.       Last week was at work, had sudden vision changes and headache.  Tried NSAIDs but did not help.  After a few days, went to Pharmacy to see if there are any other medication to try to help with pain.  Pharmacist checked blood pressure which was very high.  Pt recommended to go to ED.  Went to ED for treatment and stabilization.       Last meal was last night.  No hx fever.  Checked BP today;187/100 and 182/90 at home--did not bring BP cuff here.      Problem list and histories reviewed & adjusted, as indicated.  Additional history: as documented    Labs reviewed in EPIC    Reviewed and updated as needed this visit by clinical staff  Tobacco  Allergies  Meds  Med Hx  Surg Hx  Fam Hx  Soc Hx      Reviewed and updated as needed this visit by Provider         ROS:  C: NEGATIVE for fever, chills, change in weight  I: NEGATIVE for worrisome rashes, moles or lesions  E/M: NEGATIVE for ear, mouth and throat problems  R: NEGATIVE for significant cough or SOB  CV: NEGATIVE for chest pain, palpitations or peripheral edema  GI: NEGATIVE for nausea, abdominal pain, heartburn, or change in bowel habits  : NEGATIVE for frequency, dysuria, or hematuria  N: NEGATIVE for weakness, dizziness or paresthesias. +tension headache  E: NEGATIVE for temperature intolerance, skin/hair changes  H: NEGATIVE for bleeding problems  P: NEGATIVE for changes in mood or affect    OBJECTIVE:     /76  Pulse 76  Temp 98.2  F (36.8  C) (Tympanic)  Resp 14  Ht 5' 2.5\" (1.588 m)  Wt 158 lb (71.7 kg)  SpO2 97%  Breastfeeding? No  BMI 28.44 kg/m2  Body mass index is 28.44 kg/(m^2).   GENERAL: alert and no acute " distress  EYES: Eyes grossly normal to inspection, PERRL and conjunctivae and sclerae normal  HENT: ear canals and TM's normal, nose and mouth without ulcers or lesions  NECK: no adenopathy, no asymmetry, masses, or scars and thyroid normal to palpation  RESP: lungs clear to auscultation - no rales, rhonchi or wheezes  CV: regular rate and rhythm, normal S1 S2, no S3 or S4, no murmur, click or rub, no peripheral edema and peripheral pulses strong  ABDOMEN: soft, nontender, no hepatosplenomegaly, no masses and bowel sounds normal  MS: no gross musculoskeletal defects noted, no edema  SKIN: no suspicious lesions or rashes  NEURO: Normal strength and tone, mentation intact and speech normal  PSYCH: mentation appears normal and affect normal/bright    Diagnostic Test Results:  Fasting lipids, TSH  ASSESSMENT/PLAN:     Problem List Items Addressed This Visit     None      Visit Diagnoses     Benign essential hypertension    -  Primary    Relevant Medications    lisinopril (PRINIVIL/ZESTRIL) 10 MG tablet    Other Relevant Orders    Albumin Random Urine Quantitative with Creat Ratio (Completed)    TSH with free T4 reflex (Completed)    Lipid screening        Relevant Orders    Lipid Profile (Chol, Trig, HDL, LDL calc) (Completed)    Tension headache             66 y/o female with new HTN and associated headache and changes with vision:    Reviewed ED note from 12/2/17.  End organ damage negative on imaging and blood work.  Home readings in 180's/90s. BP today in clinic is 148/76.  In ED, BP was not ideal, >150/80-93.     Will start Lisinopril 10 mg po qd.  Fasting cholesterol check.  Screen thyroid with TSH (T4 reflex)  Discussed tension headache; stress/trigger reduction and controlling BP should help it.  Encouraged pt to see Ophthalmologist due to recent vision change and HA's.  Last saw Ophthalmologist 1 year ago, everything was normal.  F/u 3-4 weeks or sooner if needed for BP check and labs (recheck kidney  function/BMP on Lisinopril)     Genny Cuevas, [facility]

## 2017-12-05 LAB
CHOLEST SERPL-MCNC: 258 MG/DL
CREAT UR-MCNC: 163 MG/DL
HDLC SERPL-MCNC: 49 MG/DL
LDLC SERPL CALC-MCNC: 187 MG/DL
MICROALBUMIN UR-MCNC: 27 MG/L
MICROALBUMIN/CREAT UR: 16.81 MG/G CR (ref 0–25)
NONHDLC SERPL-MCNC: 209 MG/DL
TRIGL SERPL-MCNC: 108 MG/DL
TSH SERPL DL<=0.005 MIU/L-ACNC: 2.11 MU/L (ref 0.4–4)

## 2017-12-06 ENCOUNTER — TELEPHONE (OUTPATIENT)
Dept: FAMILY MEDICINE | Facility: CLINIC | Age: 67
End: 2017-12-06

## 2017-12-06 ENCOUNTER — OFFICE VISIT (OUTPATIENT)
Dept: FAMILY MEDICINE | Facility: CLINIC | Age: 67
End: 2017-12-06
Payer: COMMERCIAL

## 2017-12-06 DIAGNOSIS — E78.2 MIXED HYPERLIPIDEMIA: Chronic | ICD-10-CM

## 2017-12-06 DIAGNOSIS — I10 BENIGN ESSENTIAL HYPERTENSION: Primary | Chronic | ICD-10-CM

## 2017-12-06 DIAGNOSIS — F41.0 PANIC ATTACK: ICD-10-CM

## 2017-12-06 DIAGNOSIS — F41.1 GAD (GENERALIZED ANXIETY DISORDER): Chronic | ICD-10-CM

## 2017-12-06 PROCEDURE — 99214 OFFICE O/P EST MOD 30 MIN: CPT | Performed by: FAMILY MEDICINE

## 2017-12-06 RX ORDER — ATORVASTATIN CALCIUM 20 MG/1
20 TABLET, FILM COATED ORAL DAILY
Qty: 30 TABLET | Refills: 1 | Status: SHIPPED | OUTPATIENT
Start: 2017-12-06 | End: 2018-01-15

## 2017-12-06 RX ORDER — HYDROXYZINE HYDROCHLORIDE 25 MG/1
25-50 TABLET, FILM COATED ORAL EVERY 6 HOURS PRN
Qty: 60 TABLET | Refills: 1 | Status: SHIPPED | OUTPATIENT
Start: 2017-12-06 | End: 2018-02-19

## 2017-12-06 NOTE — TELEPHONE ENCOUNTER
Will she be able to come tomorrow for BP check with nurse (or to see me)?    I recommended that she also bring in her BP machine like we discussed during the appt

## 2017-12-06 NOTE — MR AVS SNAPSHOT
"              After Visit Summary   2017    Marciano Akins    MRN: [id number]           Patient Information     Date Of Birth          1950        Visit Information        Provider Department      2017 3:50 PM Genny Cuevas DO [facility]        Today's Diagnoses     Panic attack    -  1    MALLORIE (generalized anxiety disorder)           Follow-ups after your visit        Follow-up notes from your care team     Return in about 1 week (around 2017) for BP Recheck.      Who to contact     If you have questions or need follow up information about today's clinic visit or your schedule please contact [facility] directly at [phone].  Normal or non-critical lab and imaging results will be communicated to you by Bibahart, letter or phone within 4 business days after the clinic has received the results. If you do not hear from us within 7 days, please contact the clinic through Bibahart or phone. If you have a critical or abnormal lab result, we will notify you by phone as soon as possible.  Submit refill requests through PixelSteam or call your pharmacy and they will forward the refill request to us. Please allow 3 business days for your refill to be completed.          Additional Information About Your Visit        MyChart Information     PixelSteam lets you send messages to your doctor, view your test results, renew your prescriptions, schedule appointments and more. To sign up, go to [email or web address] . Click on \"Log in\" on the left side of the screen, which will take you to the Welcome page. Then click on \"Sign up Now\" on the right side of the page.     You will be asked to enter the access code listed below, as well as some personal information. Please follow the directions to create your username and password.     Your access code is: [email or web address]  Expires: 3/2/2018  7:31 PM     Your access code will  in 90 days. If you " "need help or a new code, please call your [facility] clinic or [phone].        Care EveryWhere ID     This is your Care EveryWhere ID. This could be used by other organizations to access your [facility] medical records  [id number]        Your Vitals Were     Pulse Temperature Respirations Height Pulse Oximetry Breastfeeding?    80 99.5  F (37.5  C) (Tympanic) 16 5' 2.5\" (1.588 m) 95% No    BMI (Body Mass Index)                   28.65 kg/m2            Blood Pressure from Last 3 Encounters:   12/06/17 184/90   12/04/17 148/76   12/02/17 151/63    Weight from Last 3 Encounters:   12/06/17 159 lb 3.2 oz (72.2 kg)   12/04/17 158 lb (71.7 kg)   06/24/17 154 lb (69.9 kg)              Today, you had the following     No orders found for display         Today's Medication Changes          These changes are accurate as of: 12/6/17  3:59 PM.  If you have any questions, ask your nurse or doctor.               Start taking these medicines.        Dose/Directions    hydrOXYzine 25 MG tablet   Commonly known as:  ATARAX   Used for:  Panic attack   Started by:  Genny Cuevas DO        Dose:  25-50 mg   Take 1-2 tablets (25-50 mg) by mouth every 6 hours as needed for anxiety   Quantity:  60 tablet   Refills:  1       sertraline 50 MG tablet   Commonly known as:  ZOLOFT   Used for:  MALLORIE (generalized anxiety disorder)   Started by:  Genny Cuevas DO        Dose:  50 mg   Take 1 tablet (50 mg) by mouth daily   Quantity:  30 tablet   Refills:  2            Where to get your medicines      These medications were sent to [facility] [address] [place] [id number]   [address] [facility] [address]     Phone:  [phone]     hydrOXYzine 25 MG tablet    sertraline 50 MG tablet                Primary Care Provider Office Phone # Fax #    Mayur Hector [phone] [address] [facility] [address]        Equal Access to Services     ANDRES SCOTT AH: Yen mcconnell " sheridan Arnold, waaxda luqadaha, qaybta kaalmada zita, ayleen richardson quinnyenni jessicaaugustine laheavendonavan keyonna. So [facility] [phone].    ATENCIÓN: Si alisiala freedom, tiene a palencia disposición servicios gratuitos de asistencia lingüística. Padmaja al [phone].    We comply with applicable federal civil rights laws and [place] laws. We do not discriminate on the basis of race, color, national origin, age, disability, sex, sexual orientation, or gender identity.            Thank you!     Thank you for choosing [facility]  for your care. Our goal is always to provide you with excellent care. Hearing back from our patients is one way we can continue to improve our services. Please take a few minutes to complete the written survey that you may receive in the mail after your visit with us. Thank you!             Your Updated Medication List - Protect others around you: Learn how to safely use, store and throw away your medicines at www.disposemymeds.org.          This list is accurate as of: 12/6/17  3:59 PM.  Always use your most recent med list.                   Brand Name Dispense Instructions for use Diagnosis    albuterol 108 (90 BASE) MCG/ACT Inhaler    PROAIR HFA    1 Inhaler    Inhale 2 puffs into the lungs every 4 hours as needed for shortness of breath / dyspnea        atorvastatin 20 MG tablet    LIPITOR    30 tablet    TAKE 1 TABLET (20 MG) BY MOUTH DAILY    Mixed hyperlipidemia       hydrOXYzine 25 MG tablet    ATARAX    60 tablet    Take 1-2 tablets (25-50 mg) by mouth every 6 hours as needed for anxiety    Panic attack       IBUPROFEN PO      Take 800 mg by mouth every 6 hours        lisinopril 10 MG tablet    PRINIVIL/ZESTRIL    30 tablet    Take 1 tablet (10 mg) by mouth daily    Benign essential hypertension       sertraline 50 MG tablet    ZOLOFT    30 tablet    Take 1 tablet (50 mg) by mouth daily    MALLORIE (generalized anxiety disorder)       vitamin D 2000 UNITS tablet     100  tablet    Take 2,000 Units by mouth daily Reported on 3/10/2017    Unspecified vitamin D deficiency

## 2017-12-06 NOTE — TELEPHONE ENCOUNTER
FYI-  Patient was called with providers message. States she was already at ED Saturday for 6 hours and was advised to follow with PCP. She would like to continue to monitor BP. Pt would consider checking BP with nurse at clinic but unable to leave home due to slippery street. Writer reviwed warning signs with pt and she agreed to seek care at ED is symptoms worsen including- chest pain, severe HA, dizziness or vision changes.

## 2017-12-06 NOTE — TELEPHONE ENCOUNTER
Her home BP machine may not be as accurate like we discussed during the appt.  But with those readings and concerning symptoms, I would recommend that she go to the ED to get checked out.  She should not take an extra dose of Lisinopril until we can get a reading at the ED.  We can discuss her cholesterol medication once she gets her BP under better control at the ED since that is the more emergent problem.

## 2017-12-06 NOTE — TELEPHONE ENCOUNTER
Patient called reporting her BP reading this morning was 199/98 and 182 systolic.She has been taking lisinopril 10 mg tablet QD. She has mild HA and felt dizzy this morning. Denies other symptoms. She would like to know if she should take and extra dose of lisinopril. She is also requesting lab results from [facility]. Asked if she should take cholesterol medication. She needs Rx if she is to take Lipitor again. Call was disconnected before pt could give current BP reading please advice.

## 2017-12-06 NOTE — TELEPHONE ENCOUNTER
Reason for call:  Patient reporting a symptom    Symptom or request:    Patient states she went to the ER and was told she had elevated BP   She was seen her Monday this week and given medication   She states her BP is still elevated and she hasn't received a call from us     Please call    Duration (how long have symptoms been present):   About a week    Have you been treated for this before? Yes    Additional comments:   Please call  Phone Number patient can be reached at:  Home number on file [phone] (home)    Best Time:   anytime    Can we leave a detailed message on this number:  YES    Call taken on 12/6/2017 at 1:30 PM by DESIREE HOLGUIN

## 2017-12-06 NOTE — NURSING NOTE
"Chief Complaint   Patient presents with     Hypertension     Recheck       Initial /84 (BP Location: Left arm, Patient Position: Sitting, Cuff Size: Adult Regular)  Pulse 80  Temp 99.5  F (37.5  C) (Tympanic)  Resp 16  Ht 5' 2.5\" (1.588 m)  Wt 159 lb 3.2 oz (72.2 kg)  SpO2 95%  Breastfeeding? No  BMI 28.65 kg/m2 Estimated body mass index is 28.65 kg/(m^2) as calculated from the following:    Height as of this encounter: 5' 2.5\" (1.588 m).    Weight as of this encounter: 159 lb 3.2 oz (72.2 kg).  Medication Reconciliation: complete     Belkis Tejeda LPN  "

## 2017-12-06 NOTE — PROGRESS NOTES
"  SUBJECTIVE:   Marciano Akins is a 67 year old female who presents to clinic today for the following health issues:        Hypertension Follow-up      Outpatient blood pressures are being checked at home.  Results are 199/102 yesterday AM, 187/99 this morning..    Low Salt Diet: no added salt  Experienced blurred vision this morning  Headache on the right side of head with sensation of pressure on the top of the pain  Neck pain X10 days.      Amount of exercise or physical activity: 2-3 days/week for an average of 15-30 minutes    Problems taking medications regularly: No    Medication side effects: none    Diet: low salt    Problem list and histories reviewed & adjusted, as indicated.  Additional history: as documented    Labs reviewed in EPIC    Reviewed and updated as needed this visit by clinical staffTobacco  Allergies  Meds  Problems  Med Hx  Surg Hx  Fam Hx  Soc Hx        Reviewed and updated as needed this visit by Provider  Allergies  Meds  Problems         ROS:  C: NEGATIVE for fever, chills, change in weight  I: NEGATIVE for worrisome rashes, moles or lesions  E/M: NEGATIVE for ear, mouth and throat problems  R: NEGATIVE for significant cough or SOB  CV: NEGATIVE for chest pain, palpitations or peripheral edema  GI: NEGATIVE for nausea, abdominal pain, heartburn, or change in bowel habits  : NEGATIVE for frequency, dysuria, or hematuria  N: NEGATIVE for weakness, dizziness or paresthesias. +tension headache  E: NEGATIVE for temperature intolerance, skin/hair changes  H: NEGATIVE for bleeding problems  P: POSITIVE for anxiety, panic attacks    OBJECTIVE:     /80 (BP Location: Left arm, Patient Position: Left side, Cuff Size: Adult Regular)  Pulse 80  Temp 99.5  F (37.5  C) (Tympanic)  Resp 16  Ht 5' 2.5\" (1.588 m)  Wt 159 lb 3.2 oz (72.2 kg)  SpO2 95%  Breastfeeding? No  BMI 28.65 kg/m2  Body mass index is 28.65 kg/(m^2).   GENERAL: alert and very cooperative/pleasant  EYES: " Eyes grossly normal to inspection, PERRL and conjunctivae and sclerae normal  HENT: ear canals and TM's normal, nose and mouth without ulcers or lesions  NECK: no adenopathy, no asymmetry, masses, or scars and thyroid normal to palpation  RESP: lungs clear to auscultation - no rales, rhonchi or wheezes  CV: regular rate and rhythm, normal S1 S2, no S3 or S4, no murmur, click or rub, no peripheral edema and peripheral pulses strong  MS: no gross musculoskeletal defects noted, no edema  NEURO: Normal strength and tone, mentation intact and speech normal  PSYCH: Anxious appearing.  Admits some depression as well.  No haylee or hallucinations. No SI/HI     Diagnostic Test Results:  none     ASSESSMENT/PLAN:     Problem List Items Addressed This Visit     None      Visit Diagnoses     Benign essential hypertension  (Chronic)   -  Primary    MALLORIE (generalized anxiety disorder)  (Chronic)       Relevant Medications    sertraline (ZOLOFT) 50 MG tablet    Panic attack        Relevant Medications    hydrOXYzine (ATARAX) 25 MG tablet    sertraline (ZOLOFT) 50 MG tablet    Mixed hyperlipidemia  (Chronic)       Relevant Medications    atorvastatin (LIPITOR) 20 MG tablet         New diagnosis of HTN; will keep pt on same dose of Lisinopril 10 mg po qd for HTN.  Pt takes it at night and has only had two doses of her Lisinopril so far.  Pt's blood pressure is not ideal today but pt remains very anxious while we've been attempting multiple BP checks and admits to hx uncontrolled anxiety.  Pt's home BP machine is about 20-30 mmHg off as well; pt agreeable to get a new BP machine at [facility].  Pt would like to start Zoloft for hx anxiety/depression; will start at 50 mg and plan to titrate up slowly.  Will add hydroxyzine PRN for episodes of panic which pt admits.  I offered counseling services but pt declines today due to concern over the extra cost.  SHe tells me she has a good support system at home.  Pt would also like to restart  her Atorvastatin 20 mg, which she has tolerated well in the past.  Discussed the need to titrate up her statin as well to max dose tolerable based on her elevated ASCVD of 21.8%; pt agreeable to titrate up Atorvastatin as tolerated.  The 10-year ASCVD risk score (Adelabrisa TELLO Jr, et al., 2013) is: 21.8%    Values used to calculate the score:      Age: 67 years      Sex: Female      Is Non- : No      Diabetic: No      Tobacco smoker: No      Systolic Blood Pressure: 172 mmHg      Is BP treated: Yes      HDL Cholesterol: 49 mg/dL      Total Cholesterol: 258 mg/dL  Will return to clinic in 1-2 weeks or sooner if needed.  See Patient Instructions for details and follow-up instructions      Genny Cuevas,   [facility]

## 2017-12-06 NOTE — TELEPHONE ENCOUNTER
FYI-  Patient does not want to schedule appointment now. States she is looking for transportation to clinic. If she is able to find someone to bring her to clinic today, she will call for appt. Otherwise, she will call tomorrow for appt after 4 PM when off from work. In the meantime, she will keep monitoring BP. Pt agreed to bring BP machine to appointment.

## 2017-12-07 VITALS
BODY MASS INDEX: 28.21 KG/M2 | DIASTOLIC BLOOD PRESSURE: 80 MMHG | WEIGHT: 159.2 LBS | SYSTOLIC BLOOD PRESSURE: 172 MMHG | HEIGHT: 63 IN | RESPIRATION RATE: 16 BRPM | TEMPERATURE: 99.5 F | OXYGEN SATURATION: 95 % | HEART RATE: 80 BPM

## 2017-12-23 ENCOUNTER — OFFICE VISIT (OUTPATIENT)
Dept: FAMILY MEDICINE | Facility: CLINIC | Age: 67
End: 2017-12-23
Payer: COMMERCIAL

## 2017-12-23 VITALS
WEIGHT: 155 LBS | BODY MASS INDEX: 27.9 KG/M2 | HEART RATE: 67 BPM | DIASTOLIC BLOOD PRESSURE: 76 MMHG | OXYGEN SATURATION: 97 % | TEMPERATURE: 97.8 F | RESPIRATION RATE: 16 BRPM | SYSTOLIC BLOOD PRESSURE: 134 MMHG

## 2017-12-23 DIAGNOSIS — N18.2 CKD (CHRONIC KIDNEY DISEASE) STAGE 2, GFR 60-89 ML/MIN: ICD-10-CM

## 2017-12-23 DIAGNOSIS — Z23 NEED FOR PROPHYLACTIC VACCINATION AND INOCULATION AGAINST INFLUENZA: Primary | ICD-10-CM

## 2017-12-23 DIAGNOSIS — E78.5 HYPERLIPIDEMIA WITH TARGET LDL LESS THAN 100: ICD-10-CM

## 2017-12-23 DIAGNOSIS — E55.9 VITAMIN D DEFICIENCY: ICD-10-CM

## 2017-12-23 DIAGNOSIS — I10 BENIGN ESSENTIAL HYPERTENSION: ICD-10-CM

## 2017-12-23 PROCEDURE — 99213 OFFICE O/P EST LOW 20 MIN: CPT | Performed by: FAMILY MEDICINE

## 2017-12-23 RX ORDER — LISINOPRIL 20 MG/1
20 TABLET ORAL DAILY
Qty: 30 TABLET | Refills: 1 | Status: SHIPPED | OUTPATIENT
Start: 2017-12-23 | End: 2018-01-15

## 2017-12-23 RX ORDER — CHOLECALCIFEROL (VITAMIN D3) 50 MCG
2000 TABLET ORAL DAILY
Qty: 100 TABLET | Refills: 3 | Status: SHIPPED | OUTPATIENT
Start: 2017-12-23 | End: 2021-12-17

## 2017-12-23 NOTE — PROGRESS NOTES
SUBJECTIVE:   Marciano Akins is a 67 year old female who presents to clinic today for the following health issues:    Hypertension Follow-up      Outpatient blood pressures are being checked at home twice daily.  Results are still high at home, >150/90.  BP usually higher at night    Low Salt Diet: no added salt      Amount of exercise or physical activity: None    Problems taking medications regularly: No    Medication side effects: none    Diet: regular (no restrictions)    Takes Lisinopril 10 mg at night around 8 pm every night. Started taking Lisinopril 10 mg about 3 weeks ago.   No side effects such as lightheadedness or dizziness.      Problem list and histories reviewed & adjusted, as indicated.  Additional history: as documented    Labs reviewed in EPIC    Reviewed and updated as needed this visit by clinical staffTobacco  Allergies  Meds  Problems  Med Hx  Surg Hx  Fam Hx  Soc Hx        Reviewed and updated as needed this visit by Provider  Allergies  Meds  Problems         ROS:  C: NEGATIVE for fever, chills, change in weight  I: NEGATIVE for worrisome rashes, moles or lesions  E/M: NEGATIVE for ear, mouth and throat problems  R: NEGATIVE for significant cough or SOB  CV: NEGATIVE for chest pain, palpitations or peripheral edema  GI: NEGATIVE for nausea, abdominal pain, heartburn, or change in bowel habits  : NEGATIVE for frequency, dysuria, or hematuria  N: NEGATIVE for weakness, dizziness or paresthesias. +tension headache  E: NEGATIVE for temperature intolerance, skin/hair changes  H: NEGATIVE for bleeding problems  P: NEGATIVE for anxiety/depression    OBJECTIVE:     /76  Pulse 67  Temp 97.8  F (36.6  C) (Tympanic)  Resp 16  Wt 155 lb (70.3 kg)  SpO2 97%  BMI 27.9 kg/m2  Body mass index is 27.9 kg/(m^2).   GENERAL: alert and very cooperative/pleasant  EYES: Eyes grossly normal to inspection, PERRL and conjunctivae and sclerae normal  HENT: ear canals and TM's normal, nose  and mouth without ulcers or lesions  NECK: no adenopathy, no asymmetry, masses, or scars and thyroid normal to palpation  RESP: lungs clear to auscultation - no rales, rhonchi or wheezes  CV: regular rate and rhythm, normal S1 S2, no S3 or S4, no murmur, click or rub, no peripheral edema and peripheral pulses strong  MS: no gross musculoskeletal defects noted, no edema  NEURO: Normal strength and tone, mentation intact and speech normal  PSYCH: no depression/anxiety.    Diagnostic Test Results:  none     ASSESSMENT/PLAN:     Problem List Items Addressed This Visit     Hyperlipidemia with target LDL less than 100    Vitamin D deficiency    Relevant Medications    Cholecalciferol (VITAMIN D) 2000 UNITS tablet      Other Visit Diagnoses     Need for prophylactic vaccination and inoculation against influenza    -  Primary    Benign essential hypertension        Relevant Medications    lisinopril (PRINIVIL/ZESTRIL) 20 MG tablet    CKD (chronic kidney disease) stage 2, GFR 60-89 ml/min             Discussed BP goal <140/90 due to hx CKD stage 2.  BP has been high at home, [id number] at home .  Checks BP twice daily, usually higher at night.    Will increase dose of Lisinopril to 20 mg po qd.  May consider lisinopril 10 mg po BID if needed for better BP control.  Will also plan to obtain renal test (BMP) at f/u in 2 weeks.    Requesting refill of Vit D as well.  Hx Vit D deficiency last June 2017.  May check Vit D level at f/u as well.   Pt stopped taking Sertraline and hydroxyzine for anxiety disorder--tells me friends family thought she was more agitated on the meds.  Thinks anxiety is better and does not want to try another anxiety med.    Genny Cuevas, DO  [facility]

## 2017-12-23 NOTE — MR AVS SNAPSHOT
"              After Visit Summary   12/23/2017    Marciano Akins    MRN: [id number]           Patient Information     Date Of Birth          1950        Visit Information        Provider Department      12/23/2017 8:00 AM Genny Cuevas DO [facility]        Today's Diagnoses     Need for prophylactic vaccination and inoculation against influenza    -  1    Benign essential hypertension        Hyperlipidemia with target LDL less than 100        Vitamin D deficiency           Follow-ups after your visit        Follow-up notes from your care team     Return in about 3 weeks (around 1/13/2018) for BP Recheck.      Who to contact     If you have questions or need follow up information about today's clinic visit or your schedule please contact [facility] directly at [phone].  Normal or non-critical lab and imaging results will be communicated to you by MyChart, letter or phone within 4 business days after the clinic has received the results. If you do not hear from us within 7 days, please contact the clinic through MyChart or phone. If you have a critical or abnormal lab result, we will notify you by phone as soon as possible.  Submit refill requests through HealthWave or call your pharmacy and they will forward the refill request to us. Please allow 3 business days for your refill to be completed.          Additional Information About Your Visit        MyChart Information     HealthWave lets you send messages to your doctor, view your test results, renew your prescriptions, schedule appointments and more. To sign up, go to [email or web address] . Click on \"Log in\" on the left side of the screen, which will take you to the Welcome page. Then click on \"Sign up Now\" on the right side of the page.     You will be asked to enter the access code listed below, as well as some personal information. Please follow the directions to create your username " and password.     Your access code is: [email or web address]  Expires: 3/2/2018  7:31 PM     Your access code will  in 90 days. If you need help or a new code, please call your [facility] clinic or [phone].        Care EveryWhere ID     This is your Care EveryWhere ID. This could be used by other organizations to access your [facility] medical records  [id number]        Your Vitals Were     Pulse Temperature Respirations Pulse Oximetry BMI (Body Mass Index)       67 97.8  F (36.6  C) (Tympanic) 16 97% 27.9 kg/m2        Blood Pressure from Last 3 Encounters:   17 134/76   17 172/80   17 148/76    Weight from Last 3 Encounters:   17 155 lb (70.3 kg)   17 159 lb 3.2 oz (72.2 kg)   17 158 lb (71.7 kg)              Today, you had the following     No orders found for display         Today's Medication Changes          These changes are accurate as of: 17  8:22 AM.  If you have any questions, ask your nurse or doctor.               These medicines have changed or have updated prescriptions.        Dose/Directions    lisinopril 20 MG tablet   Commonly known as:  PRINIVIL/ZESTRIL   This may have changed:    - medication strength  - how much to take   Used for:  Benign essential hypertension   Changed by:  Genny Cuevas DO        Dose:  20 mg   Take 1 tablet (20 mg) by mouth daily   Quantity:  30 tablet   Refills:  1            Where to get your medicines      These medications were sent to [facility] [address] [place] [employer] [place] [address] [address]     Phone:  [phone]     lisinopril 20 MG tablet    vitamin D 2000 UNITS tablet                Primary Care Provider Office Phone # Fax #    Mayur Hector [phone] [address] [place] [address]        Equal Access to Services     ANDRES SCOTT AH: Hadii aad ku hadasho Soomaali, waaxda luqadaha, qaybta kaalfallon ahumada, ayleen madrid  la'arnoldn keyonna. So [facility] [phone].    ATENCIÓN: Si habla freedom, tiene a palencia disposición servicios gratuitos de asistencia lingüística. Padmaja boyd [phone].    We comply with applicable federal civil rights laws and [place] laws. We do not discriminate on the basis of race, color, national origin, age, disability, sex, sexual orientation, or gender identity.            Thank you!     Thank you for choosing [facility]  for your care. Our goal is always to provide you with excellent care. Hearing back from our patients is one way we can continue to improve our services. Please take a few minutes to complete the written survey that you may receive in the mail after your visit with us. Thank you!             Your Updated Medication List - Protect others around you: Learn how to safely use, store and throw away your medicines at www.disposemymeds.org.          This list is accurate as of: 12/23/17  8:22 AM.  Always use your most recent med list.                   Brand Name Dispense Instructions for use Diagnosis    albuterol 108 (90 BASE) MCG/ACT Inhaler    PROAIR HFA    1 Inhaler    Inhale 2 puffs into the lungs every 4 hours as needed for shortness of breath / dyspnea        * atorvastatin 20 MG tablet    LIPITOR    30 tablet    TAKE 1 TABLET (20 MG) BY MOUTH DAILY    Mixed hyperlipidemia       * atorvastatin 20 MG tablet    LIPITOR    30 tablet    Take 1 tablet (20 mg) by mouth daily    Mixed hyperlipidemia       hydrOXYzine 25 MG tablet    ATARAX    60 tablet    Take 1-2 tablets (25-50 mg) by mouth every 6 hours as needed for anxiety    Panic attack       IBUPROFEN PO      Take 800 mg by mouth every 6 hours        lisinopril 20 MG tablet    PRINIVIL/ZESTRIL    30 tablet    Take 1 tablet (20 mg) by mouth daily    Benign essential hypertension       sertraline 50 MG tablet    ZOLOFT    30 tablet    Take 1 tablet (50 mg) by mouth daily    MALLORIE (generalized anxiety disorder)       vitamin D  2000 UNITS tablet     100 tablet    Take 2,000 Units by mouth daily Reported on 3/10/2017    Vitamin D deficiency       * Notice:  This list has 2 medication(s) that are the same as other medications prescribed for you. Read the directions carefully, and ask your doctor or other care provider to review them with you.

## 2017-12-23 NOTE — NURSING NOTE
"Chief Complaint   Patient presents with     Hypertension       Initial /76  Pulse 67  Temp 97.8  F (36.6  C) (Tympanic)  Resp 16  Wt 155 lb (70.3 kg)  SpO2 97%  BMI 27.9 kg/m2 Estimated body mass index is 27.9 kg/(m^2) as calculated from the following:    Height as of 12/6/17: 5' 2.5\" (1.588 m).    Weight as of this encounter: 155 lb (70.3 kg).  Medication Reconciliation: complete    "

## 2018-01-15 ENCOUNTER — OFFICE VISIT (OUTPATIENT)
Dept: FAMILY MEDICINE | Facility: CLINIC | Age: 68
End: 2018-01-15
Payer: COMMERCIAL

## 2018-01-15 VITALS
SYSTOLIC BLOOD PRESSURE: 162 MMHG | DIASTOLIC BLOOD PRESSURE: 74 MMHG | HEART RATE: 66 BPM | RESPIRATION RATE: 16 BRPM | HEIGHT: 63 IN | TEMPERATURE: 98.8 F | BODY MASS INDEX: 27.46 KG/M2 | OXYGEN SATURATION: 98 % | WEIGHT: 155 LBS

## 2018-01-15 DIAGNOSIS — F41.9 ANXIETY: Chronic | ICD-10-CM

## 2018-01-15 DIAGNOSIS — I10 BENIGN ESSENTIAL HYPERTENSION: Primary | Chronic | ICD-10-CM

## 2018-01-15 LAB
ANION GAP SERPL CALCULATED.3IONS-SCNC: 5 MMOL/L (ref 3–14)
BUN SERPL-MCNC: 21 MG/DL (ref 7–30)
CALCIUM SERPL-MCNC: 8.5 MG/DL (ref 8.5–10.1)
CHLORIDE SERPL-SCNC: 108 MMOL/L (ref 94–109)
CO2 SERPL-SCNC: 27 MMOL/L (ref 20–32)
CREAT SERPL-MCNC: 0.65 MG/DL (ref 0.52–1.04)
GFR SERPL CREATININE-BSD FRML MDRD: 90 ML/MIN/1.7M2
GLUCOSE SERPL-MCNC: 88 MG/DL (ref 70–99)
POTASSIUM SERPL-SCNC: 3.9 MMOL/L (ref 3.4–5.3)
SODIUM SERPL-SCNC: 140 MMOL/L (ref 133–144)

## 2018-01-15 PROCEDURE — 99213 OFFICE O/P EST LOW 20 MIN: CPT | Performed by: FAMILY MEDICINE

## 2018-01-15 PROCEDURE — 36415 COLL VENOUS BLD VENIPUNCTURE: CPT | Performed by: FAMILY MEDICINE

## 2018-01-15 PROCEDURE — 80048 BASIC METABOLIC PNL TOTAL CA: CPT | Performed by: FAMILY MEDICINE

## 2018-01-15 RX ORDER — LISINOPRIL 30 MG/1
30 TABLET ORAL DAILY
Qty: 30 TABLET | Refills: 0 | Status: SHIPPED | OUTPATIENT
Start: 2018-01-15 | End: 2018-02-19

## 2018-01-15 NOTE — LETTER
"January 15, 2018      Marciano Akins  [address]  [facility] [address]        Dear ,    We are writing to inform you of your test results.    Your BMP (Basic Metabolic Panel) \"kidney function test\" is NORMAL.      Resulted Orders   Basic metabolic panel  (Ca, Cl, CO2, Creat, Gluc, K, Na, BUN)   Result Value Ref Range    Sodium 140 133 - 144 mmol/L    Potassium 3.9 3.4 - 5.3 mmol/L    Chloride 108 94 - 109 mmol/L    Carbon Dioxide 27 20 - 32 mmol/L    Anion Gap 5 3 - 14 mmol/L    Glucose 88 70 - 99 mg/dL      Comment:      Fasting specimen    Urea Nitrogen 21 7 - 30 mg/dL    Creatinine 0.65 0.52 - 1.04 mg/dL    GFR Estimate 90 >60 mL/min/1.7m2      Comment:      Non  GFR Calc    GFR Estimate If Black >90 >60 mL/min/1.7m2      Comment:       GFR Calc    Calcium 8.5 8.5 - 10.1 mg/dL       If you have any questions or concerns, please call the clinic at the number listed above.       Sincerely,        Genny Cuevas, DO                "

## 2018-01-15 NOTE — PROGRESS NOTES
"  SUBJECTIVE:   Marciano Akins is a 67 year old female who presents to clinic today for the following health issues:        New Patient/Transfer of Care  Hypertension Follow-up      Outpatient blood pressures are being checked at home.  Results are Has monitor on.    Low Salt Diet: no added salt        Amount of exercise or physical activity: None    Problems taking medications regularly: No    Medication side effects: none    Diet: low fat/cholesterol    BP higher at night.  Runs highest at night, around 176-195/ 80-90.  In AM, BP typically 156-160/ 80.  Not having side effects like headaches or dizziness.   Taking Lisinopril without issues.     Problem list and histories reviewed & adjusted, as indicated.  Additional history: as documented    Labs reviewed in EPIC    Reviewed and updated as needed this visit by clinical staffTobacco  Allergies  Meds  Problems  Med Hx  Surg Hx  Fam Hx  Soc Hx        Reviewed and updated as needed this visit by Provider  Allergies  Meds  Problems         ROS:  C: NEGATIVE for fever, chills, change in weight  I: NEGATIVE for worrisome rashes, moles or lesions  E/M: NEGATIVE for ear, mouth and throat problems  R: NEGATIVE for significant cough or SOB  CV: NEGATIVE for chest pain, palpitations or peripheral edema  GI: NEGATIVE for nausea, abdominal pain, heartburn, or change in bowel habits  : NEGATIVE for frequency, dysuria, or hematuria  N: NEGATIVE for weakness, dizziness or paresthesias.   E: NEGATIVE for temperature intolerance, skin/hair changes  H: NEGATIVE for bleeding problems  P: POSITIVE for hx anxiety.      OBJECTIVE:     /74 (BP Location: Left arm, Patient Position: Chair, Cuff Size: Adult Regular)  Pulse 66  Temp 98.8  F (37.1  C)  Resp 16  Ht 5' 2.5\" (1.588 m)  Wt 155 lb (70.3 kg)  SpO2 98%  Breastfeeding? No  BMI 27.9 kg/m2  Body mass index is 27.9 kg/(m^2).   GENERAL: alert and very cooperative/pleasant  EYES: Eyes grossly normal to " inspection, PERRL and conjunctivae and sclerae normal  HENT: ear canals and TM's normal, nose and mouth without ulcers or lesions  NECK: no adenopathy, no asymmetry, masses, or scars and thyroid normal to palpation  RESP: lungs clear to auscultation - no rales, rhonchi or wheezes  CV: regular rate and rhythm, normal S1 S2, no S3 or S4, no murmur, click or rub, no peripheral edema and peripheral pulses strong  MS: no gross musculoskeletal defects noted, no edema  NEURO: Normal strength and tone, mentation intact and speech normal  PSYCH: no depression.  Hx anxiety.    Diagnostic Test Results:  BMP  ASSESSMENT/PLAN:     Problem List Items Addressed This Visit     Benign essential hypertension - Primary (Chronic)    Relevant Medications    lisinopril (PRINIVIL,ZESTRIL) 30 MG tablet    Other Relevant Orders    Basic metabolic panel  (Ca, Cl, CO2, Creat, Gluc, K, Na, BUN) (Completed)    Anxiety (Chronic)         Discussed BP goal <140/90 due to hx CKD stage 2.  BP has been high at home, around 150-190/80-90 at home .  Checks BP twice daily, usually higher at night.    Will increase dose of Lisinopril to 30 mg po qd.  Will also plan to obtain renal test (BMP) today.  F/u in 3-4 weeks or sooner if needed.    Pt never stopped Sertraline as it was noted during our last visit (she actually stopped taking the Atarax instead).  Would like to keep the same dose of Sertraline 50 mg po qd.  Admits re-starting hydroxyzine (Atarax) for anxiety as needed which has helped.       Genny Cuevas, DO  [facility]

## 2018-01-15 NOTE — NURSING NOTE
"Chief Complaint   Patient presents with     Hypertension     Recheck       Initial /74 (BP Location: Left arm, Patient Position: Chair, Cuff Size: Adult Regular)  Pulse 66  Temp 98.8  F (37.1  C)  Resp 16  Ht 5' 2.5\" (1.588 m)  Wt 155 lb (70.3 kg)  SpO2 98%  Breastfeeding? No  BMI 27.9 kg/m2 Estimated body mass index is 27.9 kg/(m^2) as calculated from the following:    Height as of this encounter: 5' 2.5\" (1.588 m).    Weight as of this encounter: 155 lb (70.3 kg).  Medication Reconciliation: complete     Belkis Tejeda LPN  "

## 2018-01-15 NOTE — MR AVS SNAPSHOT
After Visit Summary   1/15/2018    Marciano Akins    MRN: [id number]           Patient Information     Date Of Birth          1950        Visit Information        Provider Department      1/15/2018 7:30 AM Genny Cuevas DO [facility]        Today's Diagnoses     Benign essential hypertension    -  1      Care Instructions      Taking Your Blood Pressure  Blood pressure is the force of blood against the artery wall as it moves from the heart through the blood vessels. You can take your own blood pressure reading using a digital monitor. Take your readings the same each time, using the same arm. Take readings as often as your healthcare provider instructs.  About blood pressure monitors  Blood pressure monitors are designed for certain ages and cases. You can find monitors for older adults, for pregnant women, and for children. Make sure the one you choose is the right one for your age and situation.  The American Heart Association recommends an automatic cuff monitor that fits on your upper arm (bicep). The cuff should fit your arm size. A cuff that s too large or too small will not give an accurate reading. Measure around your upper arm to find your size.  Monitors that attach to your finger or wrist are not as accurate as monitors for your upper arm.  Ask your healthcare provider for help in choosing a monitor. Bring your monitor to your next provider visit if you need help in using it the correct way.  The steps below are general instructions for using an automatic digital monitor.  Step 1. Relax      Take your blood pressure at the same time every day, such as in the morning or evening, or at the time your healthcare provider recommends.    Wait at least a half-hour after smoking, eating, or exercising. Don't drink coffee, tea, soda, or other caffeinated beverages before checking your blood pressure.    Sit comfortably at a table with both feet on the  floor. Do not cross your legs or feet. Place the monitor near you.    Rest for a few minutes before you begin.  Step 2. Wrap the cuff      Place your arm on the table, palm up. Your arm should be at the level of your heart. Wrap the cuff around your upper arm, just above your elbow. It s best done on bare skin, not over clothing. Most cuffs will indicate where the brachial artery (the blood vessel in the middle of the arm at the inner side of the elbow) should line up with the cuff. Look in your monitor's instruction booklet for an illustration. You can also bring your cuff to your healthcare provider and have them show you how to correctly place the cuff.  Step 3. Inflate the cuff      Push the button that starts the pump.    The cuff will tighten, then loosen.    The numbers will change. When they stop changing, your blood pressure reading will appear.    Take 2 or 3 readings one minute apart.  Step 4. Write down the results of each reading      Write down your blood pressure numbers for each reading. Note the date and time. Keep your results in one place, such as a notebook. Even if your monitor has a built-in memory, keep a hard copy of the readings.    Remove the cuff from your arm. Turn off the machine.    Bring your blood pressure records with your healthcare providers at each visit.    If you start a new blood pressure medicine, note the day you started the new medicine. Also note the day if you change the dose of your medicine. This information goes on your blood pressure recording sheet. This will help your healthcare provider monitor how well the medicine changes are working.    Ask your healthcare provider what numbers should prompt you to call him or her. Also ask what numbers should prompt you to get help right away.  Date Last Reviewed: 11/1/2016 2000-2017 [employer]. [address] [place] [address], [place] [address]. All rights reserved. This information is not intended as a substitute for  "professional medical care. Always follow your healthcare professional's instructions.                Follow-ups after your visit        Follow-up notes from your care team     Return in about 4 weeks (around 2018) for BP Recheck.      Who to contact     If you have questions or need follow up information about today's clinic visit or your schedule please contact [facility] directly at [phone].  Normal or non-critical lab and imaging results will be communicated to you by Sopheonhart, letter or phone within 4 business days after the clinic has received the results. If you do not hear from us within 7 days, please contact the clinic through Sopheonhart or phone. If you have a critical or abnormal lab result, we will notify you by phone as soon as possible.  Submit refill requests through [employer] or call your pharmacy and they will forward the refill request to us. Please allow 3 business days for your refill to be completed.          Additional Information About Your Visit        SopheonharPlay2Focus Information     [employer] lets you send messages to your doctor, view your test results, renew your prescriptions, schedule appointments and more. To sign up, go to [email or web address] . Click on \"Log in\" on the left side of the screen, which will take you to the Welcome page. Then click on \"Sign up Now\" on the right side of the page.     You will be asked to enter the access code listed below, as well as some personal information. Please follow the directions to create your username and password.     Your access code is: [email or web address]  Expires: 3/2/2018  7:31 PM     Your access code will  in 90 days. If you need help or a new code, please call your [place] clinic or [phone].        Care EveryWhere ID     This is your Care EveryWhere ID. This could be used by other organizations to access your [place] medical records  [id number]        Your Vitals Were     Pulse Temperature Respirations " "Height Pulse Oximetry Breastfeeding?    66 98.8  F (37.1  C) 16 5' 2.5\" (1.588 m) 98% No    BMI (Body Mass Index)                   27.9 kg/m2            Blood Pressure from Last 3 Encounters:   01/15/18 162/74   12/23/17 134/76   12/06/17 172/80    Weight from Last 3 Encounters:   01/15/18 155 lb (70.3 kg)   12/23/17 155 lb (70.3 kg)   12/06/17 159 lb 3.2 oz (72.2 kg)              We Performed the Following     Basic metabolic panel  (Ca, Cl, CO2, Creat, Gluc, K, Na, BUN)          Today's Medication Changes          These changes are accurate as of: 1/15/18  7:48 AM.  If you have any questions, ask your nurse or doctor.               These medicines have changed or have updated prescriptions.        Dose/Directions    lisinopril 30 MG tablet   Commonly known as:  PRINIVIL,ZESTRIL   This may have changed:    - medication strength  - how much to take   Used for:  Benign essential hypertension   Changed by:  Genny Cuevas DO        Dose:  30 mg   Take 1 tablet (30 mg) by mouth daily   Quantity:  30 tablet   Refills:  0            Where to get your medicines      These medications were sent to [facility] [address] [place] [id number]     Phone:  [phone]     lisinopril 30 MG tablet                Primary Care Provider Office Phone # Fax #    Mayur Hector [phone] [address] [facility] [address]        Equal Access to Services     [facility] AH: Hadii aad ku hadasho Soomaali, waaxda luqadaha, qaybta kaalmada adeegyada, ayleen newby. So [facility] [phone].    ATENCIÓN: Si habla español, tiene a palencia disposición servicios gratuitos de asistencia lingüística. Padmaja al [phone].    We comply with applicable federal civil rights laws and [place] laws. We do not discriminate on the basis of race, color, national origin, age, disability, sex, sexual orientation, or gender identity.       "      Thank you!     Thank you for choosing [facility]  for your care. Our goal is always to provide you with excellent care. Hearing back from our patients is one way we can continue to improve our services. Please take a few minutes to complete the written survey that you may receive in the mail after your visit with us. Thank you!             Your Updated Medication List - Protect others around you: Learn how to safely use, store and throw away your medicines at www.disposemymeds.org.          This list is accurate as of: 1/15/18  7:48 AM.  Always use your most recent med list.                   Brand Name Dispense Instructions for use Diagnosis    albuterol 108 (90 BASE) MCG/ACT Inhaler    PROAIR HFA    1 Inhaler    Inhale 2 puffs into the lungs every 4 hours as needed for shortness of breath / dyspnea        atorvastatin 20 MG tablet    LIPITOR    30 tablet    TAKE 1 TABLET (20 MG) BY MOUTH DAILY    Mixed hyperlipidemia       hydrOXYzine 25 MG tablet    ATARAX    60 tablet    Take 1-2 tablets (25-50 mg) by mouth every 6 hours as needed for anxiety    Panic attack       IBUPROFEN PO      Take 800 mg by mouth every 6 hours        lisinopril 30 MG tablet    PRINIVIL,ZESTRIL    30 tablet    Take 1 tablet (30 mg) by mouth daily    Benign essential hypertension       sertraline 50 MG tablet    ZOLOFT    30 tablet    Take 1 tablet (50 mg) by mouth daily    MALLORIE (generalized anxiety disorder)       vitamin D 2000 UNITS tablet     100 tablet    Take 2,000 Units by mouth daily Reported on 3/10/2017    Vitamin D deficiency

## 2018-01-15 NOTE — PATIENT INSTRUCTIONS
Taking Your Blood Pressure  Blood pressure is the force of blood against the artery wall as it moves from the heart through the blood vessels. You can take your own blood pressure reading using a digital monitor. Take your readings the same each time, using the same arm. Take readings as often as your healthcare provider instructs.  About blood pressure monitors  Blood pressure monitors are designed for certain ages and cases. You can find monitors for older adults, for pregnant women, and for children. Make sure the one you choose is the right one for your age and situation.  The American Heart Association recommends an automatic cuff monitor that fits on your upper arm (bicep). The cuff should fit your arm size. A cuff that s too large or too small will not give an accurate reading. Measure around your upper arm to find your size.  Monitors that attach to your finger or wrist are not as accurate as monitors for your upper arm.  Ask your healthcare provider for help in choosing a monitor. Bring your monitor to your next provider visit if you need help in using it the correct way.  The steps below are general instructions for using an automatic digital monitor.  Step 1. Relax      Take your blood pressure at the same time every day, such as in the morning or evening, or at the time your healthcare provider recommends.    Wait at least a half-hour after smoking, eating, or exercising. Don't drink coffee, tea, soda, or other caffeinated beverages before checking your blood pressure.    Sit comfortably at a table with both feet on the floor. Do not cross your legs or feet. Place the monitor near you.    Rest for a few minutes before you begin.  Step 2. Wrap the cuff      Place your arm on the table, palm up. Your arm should be at the level of your heart. Wrap the cuff around your upper arm, just above your elbow. It s best done on bare skin, not over clothing. Most cuffs will indicate where the brachial artery (the  blood vessel in the middle of the arm at the inner side of the elbow) should line up with the cuff. Look in your monitor's instruction booklet for an illustration. You can also bring your cuff to your healthcare provider and have them show you how to correctly place the cuff.  Step 3. Inflate the cuff      Push the button that starts the pump.    The cuff will tighten, then loosen.    The numbers will change. When they stop changing, your blood pressure reading will appear.    Take 2 or 3 readings one minute apart.  Step 4. Write down the results of each reading      Write down your blood pressure numbers for each reading. Note the date and time. Keep your results in one place, such as a notebook. Even if your monitor has a built-in memory, keep a hard copy of the readings.    Remove the cuff from your arm. Turn off the machine.    Bring your blood pressure records with your healthcare providers at each visit.    If you start a new blood pressure medicine, note the day you started the new medicine. Also note the day if you change the dose of your medicine. This information goes on your blood pressure recording sheet. This will help your healthcare provider monitor how well the medicine changes are working.    Ask your healthcare provider what numbers should prompt you to call him or her. Also ask what numbers should prompt you to get help right away.  Date Last Reviewed: 11/1/2016 2000-2017 [employer]. [address] [place] [address], [place] [address]. All rights reserved. This information is not intended as a substitute for professional medical care. Always follow your healthcare professional's instructions.

## 2018-01-25 DIAGNOSIS — E78.2 MIXED HYPERLIPIDEMIA: ICD-10-CM

## 2018-01-29 RX ORDER — ATORVASTATIN CALCIUM 20 MG/1
TABLET, FILM COATED ORAL
Qty: 90 TABLET | Refills: 3 | Status: SHIPPED | OUTPATIENT
Start: 2018-01-29 | End: 2019-06-10

## 2018-01-29 NOTE — TELEPHONE ENCOUNTER
"Requested Prescriptions   Pending Prescriptions Disp Refills     atorvastatin (LIPITOR) 20 MG tablet 30 tablet 1    Statins Protocol Passed    1/25/2018  3:31 PM       Passed - LDL on file in past 12 months    Recent Labs   Lab Test  12/04/17   1624   LDL  187*            Passed - No abnormal creatine kinase in past 12 months    No lab results found.         Passed - Recent or future visit with authorizing provider    Patient had office visit in the last year or has a visit in the next 30 days with authorizing provider.  See \"Patient Info\" tab in inbasket, or \"Choose Columns\" in Meds & Orders section of the refill encounter.            Passed - Patient is age 18 or older       Passed - No active pregnancy on record       Passed - No positive pregnancy test in past 12 months        Prescription approved per [facility] Refill Protocol.  Marsha Kam RN- Triage [employer]    "

## 2018-02-19 ENCOUNTER — OFFICE VISIT (OUTPATIENT)
Dept: FAMILY MEDICINE | Facility: CLINIC | Age: 68
End: 2018-02-19
Payer: COMMERCIAL

## 2018-02-19 VITALS
DIASTOLIC BLOOD PRESSURE: 76 MMHG | WEIGHT: 151.5 LBS | RESPIRATION RATE: 16 BRPM | SYSTOLIC BLOOD PRESSURE: 166 MMHG | BODY MASS INDEX: 27.27 KG/M2 | OXYGEN SATURATION: 97 % | HEART RATE: 75 BPM | TEMPERATURE: 98.2 F

## 2018-02-19 DIAGNOSIS — I10 BENIGN ESSENTIAL HYPERTENSION: Primary | Chronic | ICD-10-CM

## 2018-02-19 DIAGNOSIS — F41.0 PANIC ATTACK: ICD-10-CM

## 2018-02-19 DIAGNOSIS — E78.5 HYPERLIPIDEMIA WITH TARGET LDL LESS THAN 100: ICD-10-CM

## 2018-02-19 DIAGNOSIS — F41.9 ANXIETY: Chronic | ICD-10-CM

## 2018-02-19 PROCEDURE — 99214 OFFICE O/P EST MOD 30 MIN: CPT | Performed by: FAMILY MEDICINE

## 2018-02-19 RX ORDER — HYDROXYZINE HYDROCHLORIDE 25 MG/1
TABLET, FILM COATED ORAL
Qty: 60 TABLET | Refills: 1 | Status: SHIPPED | OUTPATIENT
Start: 2018-02-19 | End: 2018-02-22

## 2018-02-19 RX ORDER — SERTRALINE HYDROCHLORIDE 100 MG/1
100 TABLET, FILM COATED ORAL DAILY
Qty: 30 TABLET | Refills: 1 | Status: SHIPPED | OUTPATIENT
Start: 2018-02-19 | End: 2018-03-21

## 2018-02-19 RX ORDER — LISINOPRIL 40 MG/1
40 TABLET ORAL DAILY
Qty: 30 TABLET | Refills: 1 | Status: SHIPPED | OUTPATIENT
Start: 2018-02-19 | End: 2018-04-21

## 2018-02-19 NOTE — PROGRESS NOTES
SUBJECTIVE:   Marciano Akins is a 67 year old female who presents to clinic today for the following health issues:      Hyperlipidemia Follow-Up      Rate your low fat/cholesterol diet?: good    Taking statin?  Yes, no muscle aches from statin    Other lipid medications/supplements?:  none    Hypertension Follow-up      Outpatient blood pressures are being checked at home twice a day.  Results are 173/81, 162/77, 148/75.    Low Salt Diet: low salt    Anxiety Follow-Up    Status since last visit: Stable    Other associated symptoms: possible panic attack, fatigue    Complicating factors:   Significant life event: No   Current substance abuse: None  Depression symptoms: No  No flowsheet data found.    MALLORIE-7    Amount of exercise or physical activity: 1 day/week for an average of 15-30 minutes    Problems taking medications regularly: No    Medication side effects: none    Diet: low salt        Problem list and histories reviewed & adjusted, as indicated.  Additional history: as documented    Labs reviewed in EPIC    Reviewed and updated as needed this visit by clinical staff  Tobacco  Allergies  Meds  Problems  Med Hx  Surg Hx  Fam Hx  Soc Hx        Reviewed and updated as needed this visit by Provider  Allergies  Meds  Problems         ROS:  C: NEGATIVE for fever, chills, change in weight  I: NEGATIVE for worrisome rashes, moles or lesions  E/M: NEGATIVE for ear, mouth and throat problems  R: NEGATIVE for significant cough or SOB  CV: NEGATIVE for chest pain, palpitations or peripheral edema  GI: NEGATIVE for nausea, abdominal pain, heartburn, or change in bowel habits  : NEGATIVE for frequency, dysuria, or hematuria  N: NEGATIVE for weakness, dizziness or paresthesias.   E: NEGATIVE for temperature intolerance, skin/hair changes  H: NEGATIVE for bleeding problems  P: POSITIVE for anxiety.      OBJECTIVE:     /76 (BP Location: Left arm, Patient Position: Sitting, Cuff Size: Adult Regular)   Pulse 75  Temp 98.2  F (36.8  C) (Tympanic)  Resp 16  Wt 151 lb 8 oz (68.7 kg)  SpO2 97%  BMI 27.27 kg/m2  Body mass index is 27.27 kg/(m^2).   GENERAL: alert and very cooperative/pleasant  EYES: Eyes grossly normal to inspection, PERRL and conjunctivae and sclerae normal  HENT: ear canals and TM's normal, nose and mouth without ulcers or lesions  NECK: no adenopathy, no asymmetry, masses, or scars and thyroid normal to palpation  RESP: lungs clear to auscultation - no rales, rhonchi or wheezes  CV: regular rate and rhythm, normal S1 S2, no S3 or S4, no murmur, click or rub, no peripheral edema and peripheral pulses strong  MS: no gross musculoskeletal defects noted, no edema  NEURO: Normal strength and tone, mentation intact and speech normal  PSYCH: no depression.  +anxiety.    Diagnostic Test Results:  none     ASSESSMENT/PLAN:     Problem List Items Addressed This Visit     Benign essential hypertension - Primary (Chronic)    Relevant Medications    lisinopril (PRINIVIL/ZESTRIL) 40 MG tablet    Anxiety (Chronic)    Relevant Medications    hydrOXYzine (ATARAX) 25 MG tablet    sertraline (ZOLOFT) 100 MG tablet    Hyperlipidemia with target LDL less than 100      Other Visit Diagnoses     Panic attack        Relevant Medications    hydrOXYzine (ATARAX) 25 MG tablet    sertraline (ZOLOFT) 100 MG tablet         Discussed BP goal <140/90 due to hx CKD stage 2.  BP has been high at home, around 150-173/75-85 at home .  Checks BP twice daily.      Will increase dose of Lisinopril to 40 mg po qd.  Will also plan to obtain renal test (BMP) at f/u in 3-4 weeks or sooner if needed.    Pt would like to increase Sertraline dose to 100 mg po qd.  Will continue hydroxyzine PRN panic/anxiety  Continue Atorvastatin for HLD    Genny Cuevas, DO  [facility]

## 2018-02-19 NOTE — MR AVS SNAPSHOT
After Visit Summary   2/19/2018    Marciano Akins    MRN: [id number]           Patient Information     Date Of Birth          1950        Visit Information        Provider Department      2/19/2018 7:30 AM Genny Cuevas DO [facility]        Today's Diagnoses     Benign essential hypertension          Care Instructions      Controlling High Blood Pressure  High blood pressure (hypertension) is often called the silent killer. This is because many people who have it don t know it. High blood pressure is defined as 140/90 mm Hg or higher. Know your blood pressure and remember to check it regularly. Doing so can save your life. Here are some things you can do to help control your blood pressure.    Choose heart-healthy foods    Select low-salt, low-fat foods. Limit sodium intake to 2,400 mg per day or the amount suggested by your healthcare provider.    Limit canned, dried, cured, packaged, and fast foods. These can contain a lot of salt.    Eat 8 to 10 servings of fruits and vegetables every day.    Choose lean meats, fish, or chicken.    Eat whole-grain pasta, brown rice, and beans.    Eat 2 to 3 servings of low-fat or fat-free dairy products.    Ask your doctor about the DASH eating plan. This plan helps reduce blood pressure.    When you go to a restaurant, ask that your meal be prepared with no added salt.  Maintain a healthy weight    Ask your healthcare provider how many calories to eat a day. Then stick to that number.    Ask your healthcare provider what weight range is healthiest for you. If you are overweight, a weight loss of only 3% to 5% of your body weight can help lower blood pressure. Generally, a good weight loss goal is to lose 10% of your body weight in a year.    Limit snacks and sweets.    Get regular exercise.  Get up and get active    Choose activities you enjoy. Find ones you can do with friends or family. This includes bicycling,  dancing, walking, and jogging.    Park farther away from building entrances.    Use stairs instead of the elevator.    When you can, walk or bike instead of driving.    [place] leaves, garden, or do household repairs.    Be active at a moderate to vigorous level of physical activity for at least 40 minutes for a minimum of 3 to 4 days a week.   Manage stress    Make time to relax and enjoy life. Find time to laugh.    Communicate your concerns with your loved ones and your healthcare provider.    Visit with family and friends, and keep up with hobbies.  Limit alcohol and quit smoking    Men should have no more than 2 drinks per day.    Women should have no more than 1 drink per day.    Talk with your healthcare provider about quitting smoking. Smoking significantly increases your risk for heart disease and stroke. Ask your healthcare provider about community smoking cessation programs and other options.  Medicines  If lifestyle changes aren t enough, your healthcare provider may prescribe high blood pressure medicine. Take all medicines as prescribed. If you have any questions about your medicines, ask your healthcare provider before stopping or changing them.   Date Last Reviewed: 4/27/2016 2000-2017 [employer] [address] [place] [address]. All rights reserved. This information is not intended as a substitute for professional medical care. Always follow your healthcare professional's instructions.                Follow-ups after your visit        Follow-up notes from your care team     Return in about 4 weeks (around 3/19/2018) for BP Recheck.      Who to contact     If you have questions or need follow up information about today's clinic visit or your schedule please contact [facility] directly at [phone].  Normal or non-critical lab and imaging results will be communicated to you by MyChart, letter or phone within 4 business days after the clinic has  "received the results. If you do not hear from us within 7 days, please contact the clinic through BERD or phone. If you have a critical or abnormal lab result, we will notify you by phone as soon as possible.  Submit refill requests through BERD or call your pharmacy and they will forward the refill request to us. Please allow 3 business days for your refill to be completed.          Additional Information About Your Visit        BERD Information     BERD lets you send messages to your doctor, view your test results, renew your prescriptions, schedule appointments and more. To sign up, go to [email or web address] . Click on \"Log in\" on the left side of the screen, which will take you to the Welcome page. Then click on \"Sign up Now\" on the right side of the page.     You will be asked to enter the access code listed below, as well as some personal information. Please follow the directions to create your username and password.     Your access code is: [email or web address]  Expires: 3/2/2018  7:31 PM     Your access code will  in 90 days. If you need help or a new code, please call your [facility] clinic or [phone].        Care EveryWhere ID     This is your Care EveryWhere ID. This could be used by other organizations to access your [facility] medical records  [id number]        Your Vitals Were     Pulse Temperature Respirations Pulse Oximetry BMI (Body Mass Index)       75 98.2  F (36.8  C) (Tympanic) 16 97% 27.27 kg/m2        Blood Pressure from Last 3 Encounters:   18 166/76   01/15/18 162/74   17 134/76    Weight from Last 3 Encounters:   18 151 lb 8 oz (68.7 kg)   01/15/18 155 lb (70.3 kg)   17 155 lb (70.3 kg)              Today, you had the following     No orders found for display         Today's Medication Changes          These changes are accurate as of 18  7:56 AM.  If you have any questions, ask your nurse or doctor.               These medicines have changed or " have updated prescriptions.        Dose/Directions    * lisinopril 30 MG tablet   Commonly known as:  PRINIVIL,ZESTRIL   This may have changed:  Another medication with the same name was added. Make sure you understand how and when to take each.   Used for:  Benign essential hypertension   Changed by:  Genny Cuevas DO        Dose:  30 mg   Take 1 tablet (30 mg) by mouth daily   Quantity:  30 tablet   Refills:  0       * lisinopril 40 MG tablet   Commonly known as:  PRINIVIL/ZESTRIL   This may have changed:  You were already taking a medication with the same name, and this prescription was added. Make sure you understand how and when to take each.   Used for:  Benign essential hypertension   Changed by:  Genny Cuevas DO        Dose:  40 mg   Take 1 tablet (40 mg) by mouth daily   Quantity:  30 tablet   Refills:  1       * Notice:  This list has 2 medication(s) that are the same as other medications prescribed for you. Read the directions carefully, and ask your doctor or other care provider to review them with you.         Where to get your medicines      These medications were sent to [facility] [address] [address] [facility] [address]     Phone:  [phone]     lisinopril 40 MG tablet                Primary Care Provider Office Phone # Fax #    Mayur Hector [phone] [address] [facility] [address]        Equal Access to Services     [facility] Hospital[facility] AH: Hadii aad ku hadasho Soomaali, waaxda luqadaha, qaybta kaalmada adeegyada, ayleen newby. So [facility] [phone].    ATENCIÓN: Si habla español, tiene a palencia disposición servicios gratuitos de asistencia lingüística. Llame al [phone].    We comply with applicable federal civil rights laws and [place] laws. We do not discriminate on the basis of race, color, national origin, age, disability, sex, sexual orientation, or gender  identity.            Thank you!     Thank you for choosing [facility]  for your care. Our goal is always to provide you with excellent care. Hearing back from our patients is one way we can continue to improve our services. Please take a few minutes to complete the written survey that you may receive in the mail after your visit with us. Thank you!             Your Updated Medication List - Protect others around you: Learn how to safely use, store and throw away your medicines at www.disposemymeds.org.          This list is accurate as of 2/19/18  7:56 AM.  Always use your most recent med list.                   Brand Name Dispense Instructions for use Diagnosis    albuterol 108 (90 BASE) MCG/ACT Inhaler    PROAIR HFA    1 Inhaler    Inhale 2 puffs into the lungs every 4 hours as needed for shortness of breath / dyspnea        atorvastatin 20 MG tablet    LIPITOR    90 tablet    TAKE 1 TABLET (20 MG) BY MOUTH DAILY    Mixed hyperlipidemia       hydrOXYzine 25 MG tablet    ATARAX    60 tablet    Take 1-2 tablets (25-50 mg) by mouth every 6 hours as needed for anxiety    Panic attack       IBUPROFEN PO      Take 800 mg by mouth every 6 hours        * lisinopril 30 MG tablet    PRINIVIL,ZESTRIL    30 tablet    Take 1 tablet (30 mg) by mouth daily    Benign essential hypertension       * lisinopril 40 MG tablet    PRINIVIL/ZESTRIL    30 tablet    Take 1 tablet (40 mg) by mouth daily    Benign essential hypertension       sertraline 50 MG tablet    ZOLOFT    30 tablet    Take 1 tablet (50 mg) by mouth daily    MALLORIE (generalized anxiety disorder)       vitamin D 2000 UNITS tablet     100 tablet    Take 2,000 Units by mouth daily Reported on 3/10/2017    Vitamin D deficiency       * Notice:  This list has 2 medication(s) that are the same as other medications prescribed for you. Read the directions carefully, and ask your doctor or other care provider to review them with you.

## 2018-02-19 NOTE — PATIENT INSTRUCTIONS
Controlling High Blood Pressure  High blood pressure (hypertension) is often called the silent killer. This is because many people who have it don t know it. High blood pressure is defined as 140/90 mm Hg or higher. Know your blood pressure and remember to check it regularly. Doing so can save your life. Here are some things you can do to help control your blood pressure.    Choose heart-healthy foods    Select low-salt, low-fat foods. Limit sodium intake to 2,400 mg per day or the amount suggested by your healthcare provider.    Limit canned, dried, cured, packaged, and fast foods. These can contain a lot of salt.    Eat 8 to 10 servings of fruits and vegetables every day.    Choose lean meats, fish, or chicken.    Eat whole-grain pasta, brown rice, and beans.    Eat 2 to 3 servings of low-fat or fat-free dairy products.    Ask your doctor about the DASH eating plan. This plan helps reduce blood pressure.    When you go to a restaurant, ask that your meal be prepared with no added salt.  Maintain a healthy weight    Ask your healthcare provider how many calories to eat a day. Then stick to that number.    Ask your healthcare provider what weight range is healthiest for you. If you are overweight, a weight loss of only 3% to 5% of your body weight can help lower blood pressure. Generally, a good weight loss goal is to lose 10% of your body weight in a year.    Limit snacks and sweets.    Get regular exercise.  Get up and get active    Choose activities you enjoy. Find ones you can do with friends or family. This includes bicycling, dancing, walking, and jogging.    Park farther away from building entrances.    Use stairs instead of the elevator.    When you can, walk or bike instead of driving.    [place] leaves, garden, or do household repairs.    Be active at a moderate to vigorous level of physical activity for at least 40 minutes for a minimum of 3 to 4 days a week.   Manage stress    Make time to relax and enjoy  life. Find time to laugh.    Communicate your concerns with your loved ones and your healthcare provider.    Visit with family and friends, and keep up with hobbies.  Limit alcohol and quit smoking    Men should have no more than 2 drinks per day.    Women should have no more than 1 drink per day.    Talk with your healthcare provider about quitting smoking. Smoking significantly increases your risk for heart disease and stroke. Ask your healthcare provider about community smoking cessation programs and other options.  Medicines  If lifestyle changes aren t enough, your healthcare provider may prescribe high blood pressure medicine. Take all medicines as prescribed. If you have any questions about your medicines, ask your healthcare provider before stopping or changing them.   Date Last Reviewed: 4/27/2016 2000-2017 [employer]. [address] [place] [address], [place] [address]. All rights reserved. This information is not intended as a substitute for professional medical care. Always follow your healthcare professional's instructions.

## 2018-02-19 NOTE — NURSING NOTE
"Chief Complaint   Patient presents with     Hypertension     f/u, fasting       Initial /76 (BP Location: Left arm, Patient Position: Sitting, Cuff Size: Adult Regular)  Pulse 75  Temp 98.2  F (36.8  C) (Tympanic)  Resp 16  Wt 151 lb 8 oz (68.7 kg)  SpO2 97%  BMI 27.27 kg/m2 Estimated body mass index is 27.27 kg/(m^2) as calculated from the following:    Height as of 1/15/18: 5' 2.5\" (1.588 m).    Weight as of this encounter: 151 lb 8 oz (68.7 kg).  Medication Reconciliation: complete     Princess FRANKO Lucio CMA      "

## 2018-02-20 ENCOUNTER — TRANSFERRED RECORDS (OUTPATIENT)
Dept: HEALTH INFORMATION MANAGEMENT | Facility: CLINIC | Age: 68
End: 2018-02-20

## 2018-02-22 ENCOUNTER — TELEPHONE (OUTPATIENT)
Dept: FAMILY MEDICINE | Facility: CLINIC | Age: 68
End: 2018-02-22

## 2018-02-22 DIAGNOSIS — F41.0 PANIC ATTACK: ICD-10-CM

## 2018-02-22 RX ORDER — HYDROXYZINE HYDROCHLORIDE 50 MG/1
TABLET, FILM COATED ORAL
Qty: 60 TABLET | Refills: 1 | Status: SHIPPED | OUTPATIENT
Start: 2018-02-22 | End: 2022-04-22

## 2018-02-22 NOTE — TELEPHONE ENCOUNTER
Please advice on dose for hydroxyzine.    hydrOXYzine (ATARAX) 25 MG tablet 60 tablet 1 2/19/2018  No   Sig: Take 0.5 to 1 tablet every 6 hours as needed for anxiety

## 2018-02-22 NOTE — TELEPHONE ENCOUNTER
Patient was called with medication directions below. She is requesting a new Rx for hydrOXYzine (ATARAX) 50 MG tablet. She will take 0.5 to 1 tablet if needed. She has already filled 60 tablets of  Hydroxyzine 25 mg, but asked for 50 mg tablets when she gets her next fill. Please advice.

## 2018-02-22 NOTE — TELEPHONE ENCOUNTER
Left detailed voice message with information below. Asked that pt call triage back if further questions.

## 2018-02-22 NOTE — TELEPHONE ENCOUNTER
Ok, will update her med list with 50 mg tabs (0.5 to 1 tab q6-8 hours PRN anxiety) and will provide 1 refill that she can get when finished with her current refill.

## 2018-02-22 NOTE — TELEPHONE ENCOUNTER
Reason for Call:  Other call back    Detailed comments: Patient is wondering if she is supposed to be taking 25 mg of hydroxyzine or 50 mg. Patient saw Dr Cuevas on Monday and Dr Cuevas sent a prescription for 25 mg but patient states she wrote 50 mg on her paper. Please call to advise.     Phone Number Patient can be reached at: Other phone number:  [phone]    Best Time: any    Can we leave a detailed message on this number? YES    Call taken on 2/22/2018 at 7:14 AM by SHANE WHITE

## 2018-02-22 NOTE — TELEPHONE ENCOUNTER
It's actually 1-2 tabs (of the 25 mg tabs) every 6-8 hours PRN anxiety.    Not 0.5-1 tabs q 6 hours.

## 2018-03-10 ENCOUNTER — OFFICE VISIT (OUTPATIENT)
Dept: FAMILY MEDICINE | Facility: CLINIC | Age: 68
End: 2018-03-10
Payer: COMMERCIAL

## 2018-03-10 ENCOUNTER — TELEPHONE (OUTPATIENT)
Dept: FAMILY MEDICINE | Facility: CLINIC | Age: 68
End: 2018-03-10

## 2018-03-10 VITALS
BODY MASS INDEX: 26.82 KG/M2 | DIASTOLIC BLOOD PRESSURE: 70 MMHG | TEMPERATURE: 97.3 F | HEART RATE: 74 BPM | RESPIRATION RATE: 14 BRPM | WEIGHT: 149 LBS | SYSTOLIC BLOOD PRESSURE: 128 MMHG | OXYGEN SATURATION: 97 %

## 2018-03-10 DIAGNOSIS — F41.9 ANXIETY: Chronic | ICD-10-CM

## 2018-03-10 DIAGNOSIS — E78.5 HYPERLIPIDEMIA WITH TARGET LDL LESS THAN 100: ICD-10-CM

## 2018-03-10 DIAGNOSIS — I10 BENIGN ESSENTIAL HYPERTENSION: Primary | Chronic | ICD-10-CM

## 2018-03-10 LAB
ANION GAP SERPL CALCULATED.3IONS-SCNC: 8 MMOL/L (ref 3–14)
BUN SERPL-MCNC: 18 MG/DL (ref 7–30)
CALCIUM SERPL-MCNC: 9 MG/DL (ref 8.5–10.1)
CHLORIDE SERPL-SCNC: 107 MMOL/L (ref 94–109)
CO2 SERPL-SCNC: 28 MMOL/L (ref 20–32)
CREAT SERPL-MCNC: 0.75 MG/DL (ref 0.52–1.04)
GFR SERPL CREATININE-BSD FRML MDRD: 76 ML/MIN/1.7M2
GLUCOSE SERPL-MCNC: 88 MG/DL (ref 70–99)
POTASSIUM SERPL-SCNC: 4.3 MMOL/L (ref 3.4–5.3)
SODIUM SERPL-SCNC: 143 MMOL/L (ref 133–144)

## 2018-03-10 PROCEDURE — 80048 BASIC METABOLIC PNL TOTAL CA: CPT | Performed by: FAMILY MEDICINE

## 2018-03-10 PROCEDURE — 36415 COLL VENOUS BLD VENIPUNCTURE: CPT | Performed by: FAMILY MEDICINE

## 2018-03-10 PROCEDURE — 99213 OFFICE O/P EST LOW 20 MIN: CPT | Performed by: FAMILY MEDICINE

## 2018-03-10 ASSESSMENT — ANXIETY QUESTIONNAIRES
GAD7 TOTAL SCORE: 6
7. FEELING AFRAID AS IF SOMETHING AWFUL MIGHT HAPPEN: SEVERAL DAYS
2. NOT BEING ABLE TO STOP OR CONTROL WORRYING: SEVERAL DAYS
5. BEING SO RESTLESS THAT IT IS HARD TO SIT STILL: NOT AT ALL
1. FEELING NERVOUS, ANXIOUS, OR ON EDGE: SEVERAL DAYS
IF YOU CHECKED OFF ANY PROBLEMS ON THIS QUESTIONNAIRE, HOW DIFFICULT HAVE THESE PROBLEMS MADE IT FOR YOU TO DO YOUR WORK, TAKE CARE OF THINGS AT HOME, OR GET ALONG WITH OTHER PEOPLE: NOT DIFFICULT AT ALL
6. BECOMING EASILY ANNOYED OR IRRITABLE: SEVERAL DAYS
3. WORRYING TOO MUCH ABOUT DIFFERENT THINGS: SEVERAL DAYS

## 2018-03-10 ASSESSMENT — PATIENT HEALTH QUESTIONNAIRE - PHQ9: 5. POOR APPETITE OR OVEREATING: SEVERAL DAYS

## 2018-03-10 NOTE — PATIENT INSTRUCTIONS
Controlling High Blood Pressure  High blood pressure (hypertension) is often called the silent killer. This is because many people who have it don t know it. High blood pressure is defined as 140/90 mm Hg or higher. Know your blood pressure and remember to check it regularly. Doing so can save your life. Here are some things you can do to help control your blood pressure.    Choose heart-healthy foods    Select low-salt, low-fat foods. Limit sodium intake to 2,400 mg per day or the amount suggested by your healthcare provider.    Limit canned, dried, cured, packaged, and fast foods. These can contain a lot of salt.    Eat 8 to 10 servings of fruits and vegetables every day.    Choose lean meats, fish, or chicken.    Eat whole-grain pasta, brown rice, and beans.    Eat 2 to 3 servings of low-fat or fat-free dairy products.    Ask your doctor about the DASH eating plan. This plan helps reduce blood pressure.    When you go to a restaurant, ask that your meal be prepared with no added salt.  Maintain a healthy weight    Ask your healthcare provider how many calories to eat a day. Then stick to that number.    Ask your healthcare provider what weight range is healthiest for you. If you are overweight, a weight loss of only 3% to 5% of your body weight can help lower blood pressure. Generally, a good weight loss goal is to lose 10% of your body weight in a year.    Limit snacks and sweets.    Get regular exercise.  Get up and get active    Choose activities you enjoy. Find ones you can do with friends or family. This includes bicycling, dancing, walking, and jogging.    Park farther away from building entrances.    Use stairs instead of the elevator.    When you can, walk or bike instead of driving.    Amo leaves, garden, or do household repairs.    Be active at a moderate to vigorous level of physical activity for at least 40 minutes for a minimum of 3 to 4 days a week.   Manage stress    Make time to relax and enjoy  life. Find time to laugh.    Communicate your concerns with your loved ones and your healthcare provider.    Visit with family and friends, and keep up with hobbies.  Limit alcohol and quit smoking    Men should have no more than 2 drinks per day.    Women should have no more than 1 drink per day.    Talk with your healthcare provider about quitting smoking. Smoking significantly increases your risk for heart disease and stroke. Ask your healthcare provider about community smoking cessation programs and other options.  Medicines  If lifestyle changes aren t enough, your healthcare provider may prescribe high blood pressure medicine. Take all medicines as prescribed. If you have any questions about your medicines, ask your healthcare provider before stopping or changing them.   Date Last Reviewed: 4/27/2016 2000-2017 [employer]. [address] [place] [address], [place] [address]. All rights reserved. This information is not intended as a substitute for professional medical care. Always follow your healthcare professional's instructions.        Your High Blood Pressure Risk Factors  Risk factors are things that make you more likely to have a disease or condition. Do you know your risk factors for high blood pressure? You can t do anything about some risk factors. But other risk factors are things that can be changed. Know what high blood pressure risk factors you have. Then find out what changes you can make to help control your risk for high blood pressure. Start with the change that you think will be easiest for you.  Risk factors you can t control  Though you can t change any of the things listed below, check off the ones that apply to you. The more boxes you check, the greater your risk for high blood pressure.  Family history  ? One or both of your parents or grandparents has had high blood pressure or heart disease.  Gender and age  ? You re a man over age 55 or a postmenopausal woman.  Risk factors you  can control  There are plenty of risk factors for high blood pressure that you can control. Learn what these risk factors are and then find out how to reduce your risk. Check the ones that apply to you.  ? What you eat  Do you eat a lot of salty, fatty, fried, or greasy foods? Do you go to restaurants or eat out frequently?  ? Alcohol consumption  Do you drink more than 1 drink a day if you are a female or more than 2 drinks a day if you male?   ? If you smoke or someone close to you smokes  Do you smoke cigarettes or cigars, chew tobacco, or dip snuff? Are you exposed to second-hand smoke on a regular basis?  ? How active you are   Are you inactive most of the time at work and at home? Do you go weeks without exercising?  ? Your weight   Has your doctor said that you are 15 or more pounds overweight?  ? Your stress level    Do you often feel anxious, nervous, and stressed? Do you feel that you don't have a supportive environment?  Date Last Reviewed: 4/27/2016 2000-2017 [employer]. [address] [place] [address]. All rights reserved. This information is not intended as a substitute for professional medical care. Always follow your healthcare professional's instructions.        Treating Anxiety Disorders with Medicine  An anxiety disorder can make you feel nervous or apprehensive, even without a clear reason. In people age 65 and older, generalized anxiety disorder is one of the most commonly diagnosed anxiety disorders. Many times it occurs with depression. Certain anxiety disorders can cause intense feelings of fear or panic. You may even have physical symptoms such as a racing heartbeat, sweating, or dizziness. If you have these feelings, you don t have to suffer anymore. Treatment to help you overcome your fears will likely include therapy (also called counseling). Medicine may also be prescribed to help control your symptoms.    Medicines  Certain medicines may be prescribed to help  control your symptoms. So you may feel less anxious. You may also feel able to move forward with therapy. At first, medicines and dosages may need to be adjusted to find what works best for you. Try to be patient. Tell your healthcare provider how a medicine makes you feel. This way, you can work together to find the treatment that s best for you. Keep in mind that medicines can have side effects. Talk with your provider about any side effects that are bothering you. Changing the dose or type of medicine may help. Don t stop taking medicine on your own. That can cause symptoms to come back.    Anti-anxiety medicine. This medicine eases symptoms and helps you relax. Your healthcare provider will explain when and how to use it. It may be prescribed for use before situations that make you anxious. You may also be told to take medicine on a regular schedule. Anti-anxiety medicine may make you feel a little sleepy or  out of it.  Don t drive a car or operate machinery while on this medicine, until you know how it affects you.  Caution  Never use alcohol or other drugs with anti-anxiety medicines. This could result in loss of muscular control, sedation, coma, or death. Also, use only the amount of medicine prescribed for you. If you think you may have taken too much, get emergency care right away.     Antidepressant medicine. This kind of medicine is often used to treat anxiety, even if you aren t depressed. An antidepressant helps balance out brain chemicals. This helps keep anxiety under control. This medicine is taken on a schedule. It takes a few weeks to start working. If you don t notice a change at first, you may just need more time. But if you don t notice results after the first few weeks, tell your provider.  Keep taking medicines as prescribed  Never change your dosage, share or use another person's medicine, or stop taking your medicines without talking to your healthcare provider first. Keep the following in  mind:    Some medicines must be taken on a schedule. Make this part of your daily routine. For instance, always take your pill before brushing your teeth. A pillbox can help you remember if you ve taken your medicine each day.    Medicines are often taken for 6 to 12 months. Your healthcare provider will then evaluate whether you need to stay on them. Many people who have also had therapy may no longer need medicine to manage anxiety.    You may need to stop taking medicine slowly to give your body time to adjust. When it s time to stop, your healthcare provider will tell you more. Remember: Never stop taking your medicine without talking to your provider first.    If symptoms return, you may need to start taking medicines again. This isn t your fault. It s just the nature of your anxiety disorder.  Special concerns    Side effects. Medicines may cause side effects. Ask your healthcare provider or pharmacist what you can expect. They may have ideas for avoiding some side effects.    Sexual problems. Some antidepressants can affect your desire for sex or your ability to have an orgasm. A change in dosage or medicine often solves the problem. If you have a sexual side effect that concerns you, tell your healthcare provider.    Addiction. If you ve never had a problem with drugs or alcohol, you may not have a problem with medicines used to treat anxiety disorders. But always discuss the medicines with your healthcare provider before taking them. If you have a history of addiction, you may not be able to use certain medicines used to treat anxiety disorders.    Medicine interactions. Always check with your pharmacist before using any over-the-counter medicines, including herbal supplements.   Date Last Reviewed: 5/1/2017 2000-2017 [employer]. [address] [place] [address], [place] [address]. All rights reserved. This information is not intended as a substitute for professional medical care. Always follow your  healthcare professional's instructions.

## 2018-03-10 NOTE — PROGRESS NOTES
SUBJECTIVE:   Marciano Akins is a 67 year old female who presents to clinic today for the following health issues:      Hypertension Follow-up      Outpatient blood pressures are being checked at home.  Results are running higher still.    Low Salt Diet: no added salt      Amount of exercise or physical activity: None    Problems taking medications regularly: No    Medication side effects: none    Diet: regular (no restrictions)      - patient states that Wednesday/Thursday she did not feel very well-got better with Ibuprofen.    Has used natural home remedies/supplements like ginger, garlic, lemon, honey, and apple cider vinegar to help lower blood pressure as well.     Problem list and histories reviewed & adjusted, as indicated.  Additional history: as documented    Labs reviewed in EPIC    Reviewed and updated as needed this visit by clinical staff  Tobacco  Allergies  Meds  Problems  Med Hx  Surg Hx  Fam Hx  Soc Hx        Reviewed and updated as needed this visit by Provider  Allergies  Meds  Problems         ROS:  C: NEGATIVE for fever, chills, change in weight  I: NEGATIVE for worrisome rashes, moles or lesions  E/M: NEGATIVE for ear, mouth and throat problems  R: NEGATIVE for significant cough or SOB  CV: NEGATIVE for chest pain, palpitations or peripheral edema  GI: NEGATIVE for nausea, abdominal pain, heartburn, or change in bowel habits  : NEGATIVE for frequency, dysuria, or hematuria  N: NEGATIVE for weakness, dizziness or paresthesias.   E: NEGATIVE for temperature intolerance, skin/hair changes  H: NEGATIVE for bleeding problems  P: POSITIVE for anxiety.      OBJECTIVE:     /70  Pulse 74  Temp 97.3  F (36.3  C) (Tympanic)  Resp 14  Wt 149 lb (67.6 kg)  SpO2 97%  BMI 26.82 kg/m2  Body mass index is 26.82 kg/(m^2).   GENERAL: alert and very cooperative/pleasant  EYES: Eyes grossly normal to inspection, PERRL and conjunctivae and sclerae normal  HENT: ear canals and TM's  normal, nose and mouth without ulcers or lesions  NECK: no adenopathy, no asymmetry, masses, or scars and thyroid normal to palpation  RESP: lungs clear to auscultation - no rales, rhonchi or wheezes  CV: regular rate and rhythm, normal S1 S2, no S3 or S4, no murmur, click or rub, no peripheral edema and peripheral pulses strong  MS: no gross musculoskeletal defects noted, no edema  NEURO: Normal strength and tone, mentation intact and speech normal  PSYCH: no depression.  +anxiety.     Diagnostic Test Results:  BMP    ASSESSMENT/PLAN:     Problem List Items Addressed This Visit     Benign essential hypertension - Primary (Chronic)    Relevant Orders    Basic metabolic panel  (Ca, Cl, CO2, Creat, Gluc, K, Na, BUN)    Anxiety (Chronic)    Hyperlipidemia with target LDL less than 100           Discussed BP goal <140/90.  BP has been wnl at home and in clinic today.  Checks BP 1-2 times daily.      Will continue dose of Lisinopril to 40 mg po qd.  Will also plan to obtain renal test (BMP) today.    Will continue Sertraline at same dose of 100 mg po qd. And continue hydroxyzine PRN panic/anxiety as well.  Continue Atorvastatin for HLD    Genny Cuevas, DO  [facility]

## 2018-03-10 NOTE — MR AVS SNAPSHOT
After Visit Summary   3/10/2018    Marciano Akins    MRN: [id number]           Patient Information     Date Of Birth          1950        Visit Information        Provider Department      3/10/2018 8:00 AM Genny Cuevas DO [facility]        Today's Diagnoses     Benign essential hypertension    -  1    Anxiety        Hyperlipidemia with target LDL less than 100          Care Instructions      Controlling High Blood Pressure  High blood pressure (hypertension) is often called the silent killer. This is because many people who have it don t know it. High blood pressure is defined as 140/90 mm Hg or higher. Know your blood pressure and remember to check it regularly. Doing so can save your life. Here are some things you can do to help control your blood pressure.    Choose heart-healthy foods    Select low-salt, low-fat foods. Limit sodium intake to 2,400 mg per day or the amount suggested by your healthcare provider.    Limit canned, dried, cured, packaged, and fast foods. These can contain a lot of salt.    Eat 8 to 10 servings of fruits and vegetables every day.    Choose lean meats, fish, or chicken.    Eat whole-grain pasta, brown rice, and beans.    Eat 2 to 3 servings of low-fat or fat-free dairy products.    Ask your doctor about the DASH eating plan. This plan helps reduce blood pressure.    When you go to a restaurant, ask that your meal be prepared with no added salt.  Maintain a healthy weight    Ask your healthcare provider how many calories to eat a day. Then stick to that number.    Ask your healthcare provider what weight range is healthiest for you. If you are overweight, a weight loss of only 3% to 5% of your body weight can help lower blood pressure. Generally, a good weight loss goal is to lose 10% of your body weight in a year.    Limit snacks and sweets.    Get regular exercise.  Get up and get active    Choose activities you enjoy. Find  ones you can do with friends or family. This includes bicycling, dancing, walking, and jogging.    Park farther away from building entrances.    Use stairs instead of the elevator.    When you can, walk or bike instead of driving.    Troy leaves, garden, or do household repairs.    Be active at a moderate to vigorous level of physical activity for at least 40 minutes for a minimum of 3 to 4 days a week.   Manage stress    Make time to relax and enjoy life. Find time to laugh.    Communicate your concerns with your loved ones and your healthcare provider.    Visit with family and friends, and keep up with hobbies.  Limit alcohol and quit smoking    Men should have no more than 2 drinks per day.    Women should have no more than 1 drink per day.    Talk with your healthcare provider about quitting smoking. Smoking significantly increases your risk for heart disease and stroke. Ask your healthcare provider about community smoking cessation programs and other options.  Medicines  If lifestyle changes aren t enough, your healthcare provider may prescribe high blood pressure medicine. Take all medicines as prescribed. If you have any questions about your medicines, ask your healthcare provider before stopping or changing them.   Date Last Reviewed: 4/27/2016 2000-2017 [employer]. [address] [place] [address], [place] [address]. All rights reserved. This information is not intended as a substitute for professional medical care. Always follow your healthcare professional's instructions.        Your High Blood Pressure Risk Factors  Risk factors are things that make you more likely to have a disease or condition. Do you know your risk factors for high blood pressure? You can t do anything about some risk factors. But other risk factors are things that can be changed. Know what high blood pressure risk factors you have. Then find out what changes you can make to help control your risk for high blood  pressure. Start with the change that you think will be easiest for you.  Risk factors you can t control  Though you can t change any of the things listed below, check off the ones that apply to you. The more boxes you check, the greater your risk for high blood pressure.  Family history  ? One or both of your parents or grandparents has had high blood pressure or heart disease.  Gender and age  ? You re a man over age 55 or a postmenopausal woman.  Risk factors you can control  There are plenty of risk factors for high blood pressure that you can control. Learn what these risk factors are and then find out how to reduce your risk. Check the ones that apply to you.  ? What you eat  Do you eat a lot of salty, fatty, fried, or greasy foods? Do you go to restaurants or eat out frequently?  ? Alcohol consumption  Do you drink more than 1 drink a day if you are a female or more than 2 drinks a day if you male?   ? If you smoke or someone close to you smokes  Do you smoke cigarettes or cigars, chew tobacco, or dip snuff? Are you exposed to second-hand smoke on a regular basis?  ? How active you are   Are you inactive most of the time at work and at home? Do you go weeks without exercising?  ? Your weight   Has your doctor said that you are 15 or more pounds overweight?  ? Your stress level    Do you often feel anxious, nervous, and stressed? Do you feel that you don't have a supportive environment?  Date Last Reviewed: 4/27/2016 2000-2017 [employer]. [address] [place] [address], [place] [address]. All rights reserved. This information is not intended as a substitute for professional medical care. Always follow your healthcare professional's instructions.        Treating Anxiety Disorders with Medicine  An anxiety disorder can make you feel nervous or apprehensive, even without a clear reason. In people age 65 and older, generalized anxiety disorder is one of the most commonly diagnosed anxiety disorders. Many  times it occurs with depression. Certain anxiety disorders can cause intense feelings of fear or panic. You may even have physical symptoms such as a racing heartbeat, sweating, or dizziness. If you have these feelings, you don t have to suffer anymore. Treatment to help you overcome your fears will likely include therapy (also called counseling). Medicine may also be prescribed to help control your symptoms.    Medicines  Certain medicines may be prescribed to help control your symptoms. So you may feel less anxious. You may also feel able to move forward with therapy. At first, medicines and dosages may need to be adjusted to find what works best for you. Try to be patient. Tell your healthcare provider how a medicine makes you feel. This way, you can work together to find the treatment that s best for you. Keep in mind that medicines can have side effects. Talk with your provider about any side effects that are bothering you. Changing the dose or type of medicine may help. Don t stop taking medicine on your own. That can cause symptoms to come back.    Anti-anxiety medicine. This medicine eases symptoms and helps you relax. Your healthcare provider will explain when and how to use it. It may be prescribed for use before situations that make you anxious. You may also be told to take medicine on a regular schedule. Anti-anxiety medicine may make you feel a little sleepy or  out of it.  Don t drive a car or operate machinery while on this medicine, until you know how it affects you.  Caution  Never use alcohol or other drugs with anti-anxiety medicines. This could result in loss of muscular control, sedation, coma, or death. Also, use only the amount of medicine prescribed for you. If you think you may have taken too much, get emergency care right away.     Antidepressant medicine. This kind of medicine is often used to treat anxiety, even if you aren t depressed. An antidepressant helps balance out brain chemicals.  This helps keep anxiety under control. This medicine is taken on a schedule. It takes a few weeks to start working. If you don t notice a change at first, you may just need more time. But if you don t notice results after the first few weeks, tell your provider.  Keep taking medicines as prescribed  Never change your dosage, share or use another person's medicine, or stop taking your medicines without talking to your healthcare provider first. Keep the following in mind:    Some medicines must be taken on a schedule. Make this part of your daily routine. For instance, always take your pill before brushing your teeth. A pillbox can help you remember if you ve taken your medicine each day.    Medicines are often taken for 6 to 12 months. Your healthcare provider will then evaluate whether you need to stay on them. Many people who have also had therapy may no longer need medicine to manage anxiety.    You may need to stop taking medicine slowly to give your body time to adjust. When it s time to stop, your healthcare provider will tell you more. Remember: Never stop taking your medicine without talking to your provider first.    If symptoms return, you may need to start taking medicines again. This isn t your fault. It s just the nature of your anxiety disorder.  Special concerns    Side effects. Medicines may cause side effects. Ask your healthcare provider or pharmacist what you can expect. They may have ideas for avoiding some side effects.    Sexual problems. Some antidepressants can affect your desire for sex or your ability to have an orgasm. A change in dosage or medicine often solves the problem. If you have a sexual side effect that concerns you, tell your healthcare provider.    Addiction. If you ve never had a problem with drugs or alcohol, you may not have a problem with medicines used to treat anxiety disorders. But always discuss the medicines with your healthcare provider before taking them. If you have  "a history of addiction, you may not be able to use certain medicines used to treat anxiety disorders.    Medicine interactions. Always check with your pharmacist before using any over-the-counter medicines, including herbal supplements.   Date Last Reviewed: 5/1/2017 2000-2017 [employer] [address] [place] [address]. All rights reserved. This information is not intended as a substitute for professional medical care. Always follow your healthcare professional's instructions.                Follow-ups after your visit        Who to contact     If you have questions or need follow up information about today's clinic visit or your schedule please contact [facility] directly at [phone].  Normal or non-critical lab and imaging results will be communicated to you by My eStore Apphart, letter or phone within 4 business days after the clinic has received the results. If you do not hear from us within 7 days, please contact the clinic through My eStore App[employer] or phone. If you have a critical or abnormal lab result, we will notify you by phone as soon as possible.  Submit refill requests through [employer] or call your pharmacy and they will forward the refill request to us. Please allow 3 business days for your refill to be completed.          Additional Information About Your Visit        MyChart Information     [employer] lets you send messages to your doctor, view your test results, renew your prescriptions, schedule appointments and more. To sign up, go to [email or web address] . Click on \"Log in\" on the left side of the screen, which will take you to the Welcome page. Then click on \"Sign up Now\" on the right side of the page.     You will be asked to enter the access code listed below, as well as some personal information. Please follow the directions to create your username and password.     Your access code is: [email or web address]  Expires: 6/8/2018  8:27 AM     Your access code " will  in 90 days. If you need help or a new code, please call your [place] clinic or [phone].        Care EveryWhere ID     This is your Care EveryWhere ID. This could be used by other organizations to access your [facility] medical records  [id number]        Your Vitals Were     Pulse Temperature Respirations Pulse Oximetry BMI (Body Mass Index)       74 97.3  F (36.3  C) (Tympanic) 14 97% 26.82 kg/m2        Blood Pressure from Last 3 Encounters:   03/10/18 128/70   18 166/76   01/15/18 162/74    Weight from Last 3 Encounters:   03/10/18 149 lb (67.6 kg)   18 151 lb 8 oz (68.7 kg)   01/15/18 155 lb (70.3 kg)              We Performed the Following     Basic metabolic panel  (Ca, Cl, CO2, Creat, Gluc, K, Na, BUN)        Primary Care Provider Office Phone # Fax #    Mayur Hector [phone]       [address] [facility] [address]        Equal Access to Services     [facility] : Hadii aad ku hadasho Soomaali, waaxda luqadaha, qaybta kaalmada adeegyada, ayleen villareal . So [facility] [phone].    ATENCIÓN: Si habla español, tiene a palencia disposición servicios gratuitos de asistencia lingüística. [facility] [phone].    We comply with applicable federal civil rights laws and [place] laws. We do not discriminate on the basis of race, color, national origin, age, disability, sex, sexual orientation, or gender identity.            Thank you!     Thank you for choosing [facility]  for your care. Our goal is always to provide you with excellent care. Hearing back from our patients is one way we can continue to improve our services. Please take a few minutes to complete the written survey that you may receive in the mail after your visit with us. Thank you!             Your Updated Medication List - Protect others around you: Learn how to safely use, store and throw away your medicines at [email or web address] [email or web address].           This list is accurate as of 3/10/18  8:27 AM.  Always use your most recent med list.                   Brand Name Dispense Instructions for use Diagnosis    albuterol 108 (90 BASE) MCG/ACT Inhaler    PROAIR HFA    1 Inhaler    Inhale 2 puffs into the lungs every 4 hours as needed for shortness of breath / dyspnea        atorvastatin 20 MG tablet    LIPITOR    90 tablet    TAKE 1 TABLET (20 MG) BY MOUTH DAILY    Mixed hyperlipidemia       hydrOXYzine 50 MG tablet    ATARAX    60 tablet    Take 0.5 to 1 tab q 6-8 hours PRN anxiety    Panic attack       IBUPROFEN PO      Take 800 mg by mouth every 6 hours        lisinopril 40 MG tablet    PRINIVIL/ZESTRIL    30 tablet    Take 1 tablet (40 mg) by mouth daily    Benign essential hypertension       sertraline 100 MG tablet    ZOLOFT    30 tablet    Take 1 tablet (100 mg) by mouth daily    Panic attack       vitamin D 2000 UNITS tablet     100 tablet    Take 2,000 Units by mouth daily Reported on 3/10/2017    Vitamin D deficiency

## 2018-03-10 NOTE — NURSING NOTE
"Chief Complaint   Patient presents with     Hypertension       Initial /70  Pulse 74  Temp 97.3  F (36.3  C) (Tympanic)  Resp 14  Wt 149 lb (67.6 kg)  SpO2 97%  BMI 26.82 kg/m2 Estimated body mass index is 26.82 kg/(m^2) as calculated from the following:    Height as of 1/15/18: 5' 2.5\" (1.588 m).    Weight as of this encounter: 149 lb (67.6 kg).  Medication Reconciliation: complete    "

## 2018-03-10 NOTE — LETTER
March 11, 2018      Marciano Akins  [address]  [place] [facility] [address]        Dear ,    We are writing to inform you of your test results.    Your kidney function test (BMP) is NORMAL, which is great!  Please let me know if you have any questions or concerns.  It was great to see you again, Marciano.  Have a great week!    Resulted Orders   Basic metabolic panel  (Ca, Cl, CO2, Creat, Gluc, K, Na, BUN)   Result Value Ref Range    Sodium 143 133 - 144 mmol/L    Potassium 4.3 3.4 - 5.3 mmol/L    Chloride 107 94 - 109 mmol/L    Carbon Dioxide 28 20 - 32 mmol/L    Anion Gap 8 3 - 14 mmol/L    Glucose 88 70 - 99 mg/dL    Urea Nitrogen 18 7 - 30 mg/dL    Creatinine 0.75 0.52 - 1.04 mg/dL    GFR Estimate 76 >60 mL/min/1.7m2      Comment:      Non  GFR Calc    GFR Estimate If Black >90 >60 mL/min/1.7m2      Comment:       GFR Calc    Calcium 9.0 8.5 - 10.1 mg/dL     If you have any questions or concerns, please call the clinic at the number listed above.     Sincerely,        Genny Cuevas, DO

## 2018-03-10 NOTE — TELEPHONE ENCOUNTER
Prior Authorization Retail Medication Request    Medication/Dose: hydroxzyine 50 mg  ICD code (if different than what is on RX):  Previously Tried and Failed:  Rationale:    Insurance Name: [employer]  Insurance ID: [id number]       Pharmacy Information (if different than what is on RX)  Name:  Phone:

## 2018-03-11 ASSESSMENT — PATIENT HEALTH QUESTIONNAIRE - PHQ9: SUM OF ALL RESPONSES TO PHQ QUESTIONS 1-9: 3

## 2018-03-11 ASSESSMENT — ANXIETY QUESTIONNAIRES: GAD7 TOTAL SCORE: 6

## 2018-03-16 NOTE — TELEPHONE ENCOUNTER
Central Prior Authorization Team   Phone: [phone]    PA Initiation    Medication: hydroxzyine 50 mg  Insurance Company: [employer] - Phone [phone] Fax [phone]  Pharm[facility]illing the Rx[id number] [place], [place] - [address]  Filling Pharmacy Phone: [phone]  Filling Pharmacy Fax: [phone]  Start Date: 3/16/2018

## 2018-03-16 NOTE — TELEPHONE ENCOUNTER
Prior Authorization Approval    Authorization Effective Date: 2/14/2018  Authorization Expiration Date: 3/16/2019  Medication: hydroxzyine 50 mg-APPROVED  Approved Dose/Quantity:    Reference #: [id number]   Insurance Company: [employer] - Phone [phone] Fax [phone]  Expected CoPay: $14.60     CoPay Card Available:      Foundation Assistance Needed:    Which Pharmacy is filling the prescription (Not needed for infusion/clinic administered): [facility] [id number] [place] [address]  Pharmacy Notified: Yes  Patient Notified: Yes

## 2018-03-19 DIAGNOSIS — F41.1 GAD (GENERALIZED ANXIETY DISORDER): Chronic | ICD-10-CM

## 2018-03-19 DIAGNOSIS — F41.0 PANIC ATTACK: ICD-10-CM

## 2018-03-19 NOTE — TELEPHONE ENCOUNTER
DISCONTINUED 2/19/18.   POSSIBLE DOSE INCREASE  sertraline (ZOLOFT) 50 MG tablet (Discontinued)      Last Written Prescription Date:  12/6/17  Last Fill Quantity: 30 TABLET,   # refills: 2  Last Office Visit: 3/10/18 WITH ANKIT  Future Office visit:       Routing refill request to provider for review/approval because:  Drug not active on patient's medication list

## 2018-03-21 RX ORDER — SERTRALINE HYDROCHLORIDE 100 MG/1
100 TABLET, FILM COATED ORAL DAILY
Qty: 90 TABLET | Refills: 1 | Status: SHIPPED | OUTPATIENT
Start: 2018-03-21 | End: 2019-06-04

## 2018-03-21 NOTE — TELEPHONE ENCOUNTER
Per 3/10/18 OV: Will continue Sertraline at same dose of 100 mg po qd    sertraline (ZOLOFT) 100 MG tablet  Last Written Prescription Date: 2/19/18   Last Fill Quantity: 30,  # refills: 1   Last office visit: 3/10/2018 with prescribing provider:  Dr. Cuevas    Future Office Visit:      PHQ-9 SCORE 9/16/2014 10/30/2015 3/10/2018   Total Score 3 - -   Total Score - 3 3     Prescription approved per [facility] Refill Protocol.

## 2018-04-21 DIAGNOSIS — I10 BENIGN ESSENTIAL HYPERTENSION: Chronic | ICD-10-CM

## 2018-04-21 NOTE — TELEPHONE ENCOUNTER
"Requested Prescriptions   Pending Prescriptions Disp Refills     lisinopril (PRINIVIL/ZESTRIL) 40 MG tablet  Last Written Prescription Date:  02/19/2018  Last Fill Quantity: 30,  # refills: 1   Last office visit: 3/10/2018 with prescribing provider:  ANKIT   Future Office Visit:     30 tablet 1     Sig: Take 1 tablet (40 mg) by mouth daily    ACE Inhibitors (Including Combos) Protocol Passed    4/21/2018  9:56 AM       Passed - Blood pressure under 140/90 in past 12 months    BP Readings from Last 3 Encounters:   03/10/18 128/70   02/19/18 166/76   01/15/18 162/74                Passed - Recent (12 mo) or future (30 days) visit within the authorizing provider's specialty    Patient had office visit in the last 12 months or has a visit in the next 30 days with authorizing provider or within the authorizing provider's specialty.  See \"Patient Info\" tab in inbasket, or \"Choose Columns\" in Meds & Orders section of the refill encounter.           Passed - Patient is age 18 or older       Passed - No active pregnancy on record       Passed - Normal serum creatinine on file in past 12 months    Recent Labs   Lab Test  03/10/18   0837   CR  0.75            Passed - Normal serum potassium on file in past 12 months    Recent Labs   Lab Test  03/10/18   0837   POTASSIUM  4.3            Passed - No positive pregnancy test in past 12 months          "

## 2018-04-21 NOTE — TELEPHONE ENCOUNTER
Reason for Call:  Medication or medication refill:    Do you use a [facility]?  [facility]    Name of the medication requested: lisinopril (PRINIVIL/ZESTRIL) 40 MG tablet    Other request: Pt has 2 left    Can we leave a detailed message on this number? YES    Phone number patient can be reached at: Home number on file [phone] (home)    Best Time: any      Call taken on 4/21/2018 at 9:55 AM by DESI SHEN

## 2018-04-23 RX ORDER — LISINOPRIL 40 MG/1
40 TABLET ORAL DAILY
Qty: 90 TABLET | Refills: 2 | Status: SHIPPED | OUTPATIENT
Start: 2018-04-23 | End: 2019-01-09

## 2018-04-23 NOTE — TELEPHONE ENCOUNTER
Prescription approved per [facility] Refill Protocol for 12 months of refills since last appointment, which was 3/10/18

## 2018-06-16 ENCOUNTER — RADIANT APPOINTMENT (OUTPATIENT)
Dept: GENERAL RADIOLOGY | Facility: CLINIC | Age: 68
End: 2018-06-16
Attending: FAMILY MEDICINE
Payer: COMMERCIAL

## 2018-06-16 ENCOUNTER — OFFICE VISIT (OUTPATIENT)
Dept: FAMILY MEDICINE | Facility: CLINIC | Age: 68
End: 2018-06-16
Payer: COMMERCIAL

## 2018-06-16 VITALS
TEMPERATURE: 98.2 F | RESPIRATION RATE: 14 BRPM | WEIGHT: 150 LBS | BODY MASS INDEX: 27.6 KG/M2 | SYSTOLIC BLOOD PRESSURE: 130 MMHG | DIASTOLIC BLOOD PRESSURE: 76 MMHG | OXYGEN SATURATION: 98 % | HEART RATE: 62 BPM | HEIGHT: 62 IN

## 2018-06-16 DIAGNOSIS — M79.644 THUMB PAIN, RIGHT: ICD-10-CM

## 2018-06-16 DIAGNOSIS — I10 BENIGN ESSENTIAL HYPERTENSION: Chronic | ICD-10-CM

## 2018-06-16 DIAGNOSIS — E55.9 VITAMIN D DEFICIENCY: ICD-10-CM

## 2018-06-16 DIAGNOSIS — Z71.89 COUNSELING ON HEALTH CARE DIRECTIVE: ICD-10-CM

## 2018-06-16 DIAGNOSIS — E78.5 HYPERLIPIDEMIA WITH TARGET LDL LESS THAN 100: ICD-10-CM

## 2018-06-16 DIAGNOSIS — Z00.00 ENCOUNTER FOR ROUTINE ADULT HEALTH EXAMINATION WITHOUT ABNORMAL FINDINGS: Primary | ICD-10-CM

## 2018-06-16 DIAGNOSIS — F41.9 ANXIETY: Chronic | ICD-10-CM

## 2018-06-16 LAB
ALBUMIN SERPL-MCNC: 4 G/DL (ref 3.4–5)
ALP SERPL-CCNC: 99 U/L (ref 40–150)
ALT SERPL W P-5'-P-CCNC: 16 U/L (ref 0–50)
ANION GAP SERPL CALCULATED.3IONS-SCNC: 6 MMOL/L (ref 3–14)
AST SERPL W P-5'-P-CCNC: 13 U/L (ref 0–45)
BILIRUB SERPL-MCNC: 0.6 MG/DL (ref 0.2–1.3)
BUN SERPL-MCNC: 18 MG/DL (ref 7–30)
CALCIUM SERPL-MCNC: 8.8 MG/DL (ref 8.5–10.1)
CHLORIDE SERPL-SCNC: 107 MMOL/L (ref 94–109)
CO2 SERPL-SCNC: 27 MMOL/L (ref 20–32)
CREAT SERPL-MCNC: 0.67 MG/DL (ref 0.52–1.04)
GFR SERPL CREATININE-BSD FRML MDRD: 88 ML/MIN/1.7M2
GLUCOSE SERPL-MCNC: 88 MG/DL (ref 70–99)
POTASSIUM SERPL-SCNC: 4 MMOL/L (ref 3.4–5.3)
PROT SERPL-MCNC: 8.1 G/DL (ref 6.8–8.8)
SODIUM SERPL-SCNC: 140 MMOL/L (ref 133–144)

## 2018-06-16 PROCEDURE — 73140 X-RAY EXAM OF FINGER(S): CPT | Mod: RT

## 2018-06-16 PROCEDURE — 80053 COMPREHEN METABOLIC PANEL: CPT | Performed by: FAMILY MEDICINE

## 2018-06-16 PROCEDURE — 36415 COLL VENOUS BLD VENIPUNCTURE: CPT | Performed by: FAMILY MEDICINE

## 2018-06-16 PROCEDURE — G0439 PPPS, SUBSEQ VISIT: HCPCS | Performed by: FAMILY MEDICINE

## 2018-06-16 ASSESSMENT — ACTIVITIES OF DAILY LIVING (ADL)
CURRENT_FUNCTION: NO ASSISTANCE NEEDED
I_NEED_ASSISTANCE_FOR_THE_FOLLOWING_DAILY_ACTIVITIES:: NO ASSISTANCE IS NEEDED

## 2018-06-16 NOTE — MR AVS SNAPSHOT
After Visit Summary   6/16/2018    Marciano Akins    MRN: [id number]           Patient Information     Date Of Birth          1950        Visit Information        Provider Department      6/16/2018 8:00 AM Genny Cuevas DO [facility]        Today's Diagnoses     Encounter for routine adult health examination without abnormal findings    -  1      Care Instructions      Preventive Health Recommendations    Female Ages 65 +    Yearly exam:     See your health care provider every year in order to  o Review health changes.   o Discuss preventive care.    o Review your medicines if your doctor has prescribed any.      You no longer need a yearly Pap test unless you've had an abnormal Pap test in the past 10 years. If you have vaginal symptoms, such as bleeding or discharge, be sure to talk with your provider about a Pap test.      Every 1 to 2 years, have a mammogram.  If you are over 69, talk with your health care provider about whether or not you want to continue having screening mammograms.      Every 10 years, have a colonoscopy. Or, have a yearly FIT test (stool test). These exams will check for colon cancer.       Have a cholesterol test every 5 years, or more often if your doctor advises it.       Have a diabetes test (fasting glucose) every three years. If you are at risk for diabetes, you should have this test more often.       At age 65, have a bone density scan (DEXA) to check for osteoporosis (brittle bone disease).    Shots:    Get a flu shot each year.    Get a tetanus shot every 10 years.    Talk to your doctor about your pneumonia vaccines. There are now two you should receive - Pneumovax (PPSV 23) and Prevnar (PCV 13).    Talk to your doctor about the shingles vaccine.    Talk to your doctor about the hepatitis B vaccine.    Nutrition:     Eat at least 5 servings of fruits and vegetables each day.      Eat whole-grain bread, whole-wheat pasta  and brown rice instead of white grains and rice.      Talk to your provider about Calcium and Vitamin D.     Lifestyle    Exercise at least 150 minutes a week (30 minutes a day, 5 days a week). This will help you control your weight and prevent disease.      Limit alcohol to one drink per day.      No smoking.       Wear sunscreen to prevent skin cancer.       See your dentist twice a year for an exam and cleaning.      See your eye doctor every 1 to 2 years to screen for conditions such as glaucoma, macular degeneration and cataracts.    Preventive Health Recommendations    Female Ages 65 +    Yearly exam:     See your health care provider every year in order to  o Review health changes.   o Discuss preventive care.    o Review your medicines if your doctor has prescribed any.      You no longer need a yearly Pap test unless you've had an abnormal Pap test in the past 10 years. If you have vaginal symptoms, such as bleeding or discharge, be sure to talk with your provider about a Pap test.      Every 1 to 2 years, have a mammogram.  If you are over 69, talk with your health care provider about whether or not you want to continue having screening mammograms.      Every 10 years, have a colonoscopy. Or, have a yearly FIT test (stool test). These exams will check for colon cancer.       Have a cholesterol test every 5 years, or more often if your doctor advises it.       Have a diabetes test (fasting glucose) every three years. If you are at risk for diabetes, you should have this test more often.       At age 65, have a bone density scan (DEXA) to check for osteoporosis (brittle bone disease).    Shots:    Get a flu shot each year.    Get a tetanus shot every 10 years.    Talk to your doctor about your pneumonia vaccines. There are now two you should receive - Pneumovax (PPSV 23) and Prevnar (PCV 13).    Talk to your doctor about the shingles vaccine.    Talk to your doctor about the hepatitis B  "vaccine.    Nutrition:     Eat at least 5 servings of fruits and vegetables each day.      Eat whole-grain bread, whole-wheat pasta and brown rice instead of white grains and rice.      Talk to your provider about Calcium and Vitamin D.     Lifestyle    Exercise at least 150 minutes a week (30 minutes a day, 5 days a week). This will help you control your weight and prevent disease.      Limit alcohol to one drink per day.      No smoking.       Wear sunscreen to prevent skin cancer.       See your dentist twice a year for an exam and cleaning.      See your eye doctor every 1 to 2 years to screen for conditions such as glaucoma, macular degeneration and cataracts.          Follow-ups after your visit        Follow-up notes from your care team     Return for Routine Visit.      Who to contact     If you have questions or need follow up information about today's clinic visit or your schedule please contact [facility] directly at [phone].  Normal or non-critical lab and imaging results will be communicated to you by MyChart, letter or phone within 4 business days after the clinic has received the results. If you do not hear from us within 7 days, please contact the clinic through MyChart or phone. If you have a critical or abnormal lab result, we will notify you by phone as soon as possible.  Submit refill requests through Teamie or call your pharmacy and they will forward the refill request to us. Please allow 3 business days for your refill to be completed.          Additional Information About Your Visit        Care EveryWhere ID     This is your Care EveryWhere ID. This could be used by other organizations to access your [facility] medical records  [id number]        Your Vitals Were     Pulse Temperature Respirations Height Pulse Oximetry Breastfeeding?    62 98.2  F (36.8  C) (Tympanic) 14 5' 1.75\" (1.568 m) 98% No    BMI (Body Mass Index)                   27.66 kg/m2         "    Blood Pressure from Last 3 Encounters:   06/16/18 130/76   03/10/18 128/70   02/19/18 166/76    Weight from Last 3 Encounters:   06/16/18 150 lb (68 kg)   03/10/18 149 lb (67.6 kg)   02/19/18 151 lb 8 oz (68.7 kg)              Today, you had the following     No orders found for display       Primary Care Provider Office Phone # Fax #    Mayur Hector [phone]       [address] [facility]Ind[facility] [address]        Equal Access to Services     [facility]: Hadii aad ku hadasho Soomaali, waaxda luqadaha, qaybta kaalmada adeegyada, ayleen quiros hayjam adeyenni villareal . So [facility] [phone].    ATENCIÓN: Si habla español, tiene a palencia disposición servicios gratuitos de asistencia lingüística. LlSelect [facility] [phone].    We comply with applicable federal civil rights laws and [place] laws. We do not discriminate on the basis of race, color, national origin, age, disability, sex, sexual orientation, or gender identity.            Thank you!     Thank you for choosing [facility]  for your care. Our goal is always to provide you with excellent care. Hearing back from our patients is one way we can continue to improve our services. Please take a few minutes to complete the written survey that you may receive in the mail after your visit with us. Thank you!             Your Updated Medication List - Protect others around you: Learn how to safely use, store and throw away your medicines at www.disposemymeds.org.          This list is accurate as of 6/16/18  8:31 AM.  Always use your most recent med list.                   Brand Name Dispense Instructions for use Diagnosis    albuterol 108 (90 Base) MCG/ACT Inhaler    PROAIR HFA    1 Inhaler    Inhale 2 puffs into the lungs every 4 hours as needed for shortness of breath / dyspnea        atorvastatin 20 MG tablet    LIPITOR    90 tablet    TAKE 1 TABLET (20 MG) BY MOUTH DAILY    Mixed hyperlipidemia        hydrOXYzine 50 MG tablet    ATARAX    60 tablet    Take 0.5 to 1 tab q 6-8 hours PRN anxiety    Panic attack       IBUPROFEN PO      Take 800 mg by mouth every 6 hours        lisinopril 40 MG tablet    PRINIVIL/ZESTRIL    90 tablet    Take 1 tablet (40 mg) by mouth daily    Benign essential hypertension       sertraline 100 MG tablet    ZOLOFT    90 tablet    Take 1 tablet (100 mg) by mouth daily    Panic attack       vitamin D 2000 units tablet     100 tablet    Take 2,000 Units by mouth daily Reported on 3/10/2017    Vitamin D deficiency

## 2018-06-16 NOTE — LETTER
"June 18, 2018      Marciano Akins  [address]  [place] [address]        Dear ,    We are writing to inform you of your test results.    Your CMP (\"Complete Metabolic Panel\") which measures your kidney and liver functions was NORMAL.    Resulted Orders   Comprehensive metabolic panel (BMP + Alb, Alk Phos, ALT, AST, Total. Bili, TP)   Result Value Ref Range    Sodium 140 133 - 144 mmol/L    Potassium 4.0 3.4 - 5.3 mmol/L    Chloride 107 94 - 109 mmol/L    Carbon Dioxide 27 20 - 32 mmol/L    Anion Gap 6 3 - 14 mmol/L    Glucose 88 70 - 99 mg/dL      Comment:      Fasting specimen    Urea Nitrogen 18 7 - 30 mg/dL    Creatinine 0.67 0.52 - 1.04 mg/dL    GFR Estimate 88 >60 mL/min/1.7m2      Comment:      Non  GFR Calc    GFR Estimate If Black >90 >60 mL/min/1.7m2      Comment:       GFR Calc    Calcium 8.8 8.5 - 10.1 mg/dL    Bilirubin Total 0.6 0.2 - 1.3 mg/dL    Albumin 4.0 3.4 - 5.0 g/dL    Protein Total 8.1 6.8 - 8.8 g/dL    Alkaline Phosphatase 99 40 - 150 U/L    ALT 16 0 - 50 U/L    AST 13 0 - 45 U/L       If you have any questions or concerns, please call the clinic at the number listed above.       Sincerely,        Genny Cuevas, DO                " ,DirectAddress_Unknown,[email or web address]

## 2018-06-16 NOTE — PROGRESS NOTES
SUBJECTIVE:   Marciano Akins is a 67 year old female who presents for Preventive Visit.    Are you in the first 12 months of your Medicare Part B coverage?  No    Healthy Habits:    Do you get at least three servings of calcium containing foods daily (dairy, green leafy vegetables, etc.)? yes    Amount of exercise or daily activities, outside of work: 2 day(s) per week    Problems taking medications regularly No    Medication side effects: No    Have you had an eye exam in the past two years? yes    Do you see a dentist twice per year? yes    Do you have sleep apnea, excessive snoring or daytime drowsiness?no      Ability to successfully perform activities of daily living: Yes, no assistance needed    Home safety:  none identified     Hearing impairment: No    Fall risk:  Fallen 2 or more times in the past year?: No  Any fall with injury in the past year?: No        COGNITIVE SCREEN  1) Repeat 3 items (Banana, Sunrise, Chair)    2) Clock draw: ABNORMAL   3) 3 item recall: Recalls 3 objects  Results: 3 items recalled: COGNITIVE IMPAIRMENT LESS LIKELY    Mini-CogTM Copyright [employer]. Licensed by the author for use in [facility]; reprinted with permission (patricia@[email or web address]). All rights reserved.                Reviewed and updated as needed this visit by clinical staff  Tobacco  Allergies  Meds  Problems  Med Hx  Surg Hx  Fam Hx  Soc Hx          Reviewed and updated as needed this visit by Provider  Allergies  Meds  Problems        Social History   Substance Use Topics     Smoking status: Never Smoker     Smokeless tobacco: Never Used     Alcohol use No       If you drink alcohol do you typically have >3 drinks per day or >7 drinks per week? No                        Today's PHQ-2 Score:   PHQ-2 ( 1999 [employer]) 6/16/2018 6/16/2018   Q1: Little interest or pleasure in doing things 0 3   Q2: Feeling down, depressed or hopeless 0 0   PHQ-2 Score 0 3   Q1: Little interest or pleasure in doing things  Nearly every day -   Q2: Feeling down, depressed or hopeless Not at all -   PHQ-2 Score 3 -       Do you feel safe in your environment - Yes    Do you have a Health Care Directive?: Yes: Patient states has Advance Directive and will bring in a copy to clinic.    Current providers sharing in care for this patient include:   Patient Care Team:  Mayur Hector MD as PCP - General (Family Practice)  Sara Estrada MD (Family Practice)    The following health maintenance items are reviewed in Epic and correct as of today:  Health Maintenance   Topic Date Due     INFLUENZA VACCINE (Season Ended) 09/01/2018     FALL RISK ASSESSMENT  12/04/2018     MALLORIE QUESTIONNAIRE 1 YEAR  03/10/2019     PHQ-9 Q1YR  03/10/2019     MAMMO SCREEN Q2 YR (SYSTEM ASSIGNED)  11/17/2019     ADVANCE DIRECTIVE PLANNING Q5 YRS  02/18/2021     COLONOSCOPY Q10 YR  10/20/2021     LIPID SCREEN Q5 YR FEMALE (SYSTEM ASSIGNED)  12/04/2022     TETANUS IMMUNIZATION (SYSTEM ASSIGNED)  10/30/2025     DEXA SCAN SCREENING (SYSTEM ASSIGNED)  Completed     PNEUMOCOCCAL  Completed     HEPATITIS C SCREENING  Completed     Labs reviewed in [facility]    Mammogram Screening: Patient over age 50, mutual decision to screen reflected in health maintenance.    ROS:  CONSTITUTIONAL: NEGATIVE for fever, chills, change in weight  INTEGUMENTARY/SKIN: NEGATIVE for worrisome rashes, moles or lesions  EYES: NEGATIVE for vision changes or irritation  ENT/MOUTH: NEGATIVE for ear, mouth and throat problems  RESP: NEGATIVE for significant cough or SOB  BREAST: NEGATIVE for masses, tenderness or discharge  CV: NEGATIVE for chest pain, palpitations or peripheral edema  GI: NEGATIVE for nausea, abdominal pain, heartburn, or change in bowel habits  : NEGATIVE for frequency, dysuria, or hematuria  MUSCULOSKELETAL: +right thumb pain  NEURO: NEGATIVE for weakness, dizziness or paresthesias  ENDOCRINE: NEGATIVE for temperature intolerance, skin/hair changes  HEME: NEGATIVE  "for bleeding problems  PSYCHIATRIC: NEGATIVE for changes in mood or affect    OBJECTIVE:   /76 (BP Location: Left arm, Patient Position: Sitting, Cuff Size: Adult Regular)  Pulse 62  Temp 98.2  F (36.8  C) (Tympanic)  Resp 14  Ht 5' 1.75\" (1.568 m)  Wt 150 lb (68 kg)  SpO2 98%  Breastfeeding? No  BMI 27.66 kg/m2 Estimated body mass index is 27.66 kg/(m^2) as calculated from the following:    Height as of this encounter: 5' 1.75\" (1.568 m).    Weight as of this encounter: 150 lb (68 kg).  EXAM:   GENERAL APPEARANCE: alert and no distress  EYES: Eyes grossly normal to inspection, PERRL and conjunctivae and sclerae normal  HENT: ear canals and TM's normal, nose and mouth without ulcers or lesions, oropharynx clear and oral mucous membranes moist  NECK: no adenopathy, no asymmetry, masses, or scars and thyroid normal to palpation  RESP: lungs clear to auscultation - no rales, rhonchi or wheezes  BREAST: normal without masses, tenderness or nipple discharge and no palpable axillary masses or adenopathy  CV: regular rate and rhythm, normal S1 S2, no S3 or S4, no murmur, click or rub, no peripheral edema and peripheral pulses strong  ABDOMEN: soft, nontender, no hepatosplenomegaly, no masses and bowel sounds normal   (female): deferred per pt request  MS: gait is age appropriate without ataxia.  +right thumb pain tenderness with palpation of the entire joint and thumb flexion; joint also appears slight more swollen (vs left thumb joint), non-erythematous.  SKIN: no suspicious lesions or rashes  NEURO: Normal strength and tone, sensory exam grossly normal, mentation intact and speech normal  PSYCH: mentation appears normal and affect normal/bright    ASSESSMENT / PLAN:   Marciano was seen today for medicare visit.    Diagnoses and all orders for this visit:    Encounter for routine adult health examination without abnormal findings    Benign essential hypertension  -     Comprehensive metabolic panel (BMP + " "Alb, Alk Phos, ALT, AST, Total. Bili, TP)    Thumb pain, right  -     XR Finger Right G/E 2 Views; Future    Counseling on health care directive    Hyperlipidemia with target LDL less than 100    Vitamin D deficiency    Anxiety      --Continue Sertraline and Hydroxyzine for anxiety.  Recently having some life stressors-- recently diagnosed with rectal CA (getting chemo, radiation and has upcoming surgery).  Marciano is doing ok though and would like to continue same dose of anxiety meds.  --Obtaining CMP for medication monitoring.  --Continue statin and lisinopril.  --Having thumb pain, likely due to overuse/arthritis.  Will obtain xray.  Requesting oral prednisone if +arthritis changes.  Discussed possibly getting a steroid injection but pt would like to defer this for now.  --Follow-up recommended for yearly preventive exams or sooner for an acute issues.    Needs to see provider in 6-7 months for med refills and monitor labs      End of Life Planning:  Patient currently has an advanced directive: Yes.  Practitioner is supportive of decision.    COUNSELING:  Reviewed preventive health counseling, as reflected in patient instructions  Special attention given to:       Regular exercise       Healthy diet/nutrition       Vision screening       Hearing screening       Dental care       Colon cancer screening        Estimated body mass index is 27.66 kg/(m^2) as calculated from the following:    Height as of this encounter: 5' 1.75\" (1.568 m).    Weight as of this encounter: 150 lb (68 kg).       reports that she has never smoked. She has never used smokeless tobacco.      Appropriate preventive services were discussed with this patient, including applicable screening as appropriate for cardiovascular disease, diabetes, osteopenia/osteoporosis, and glaucoma.  As appropriate for age/gender, discussed screening for colorectal cancer, prostate cancer, breast cancer, and cervical cancer. Checklist reviewing " preventive services available has been given to the patient.    Reviewed patients plan of care and provided an AVS. The Basic Care Plan (routine screening as documented in Health Maintenance) for Marciano meets the Care Plan requirement. This Care Plan has been established and reviewed with the Patient.    Counseling Resources:  ATP IV Guidelines  Pooled Cohorts Equation Calculator  Breast Cancer Risk Calculator  FRAX Risk Assessment  ICSI Preventive Guidelines  Dietary Guidelines for Americans, 2010  Targeter App's MyPlate  ASA Prophylaxis  Lung CA Screening    Genny Cuevas, [facility]  Answers for HPI/ROS submitted by the patient on 6/16/2018   Annual Exam:  Getting at least 3 servings of Calcium per day:: Yes  Bi-annual eye exam:: Yes  Dental care twice a year:: Yes  Sleep apnea or symptoms of sleep apnea:: None  Taking medications regularly:: Yes  Activities of Daily Living: no assistance needed  Home safety: no safety concerns identified  Hearing Impairment:: difficulty understanding soft or whispered speech, no hearing concerns  PHQ-2 Score: 3

## 2018-11-12 ENCOUNTER — HOSPITAL ENCOUNTER (OUTPATIENT)
Dept: MAMMOGRAPHY | Facility: CLINIC | Age: 68
Discharge: HOME | End: 2018-11-12
Attending: FAMILY MEDICINE | Admitting: FAMILY MEDICINE
Payer: COMMERCIAL

## 2018-11-12 DIAGNOSIS — Z12.31 VISIT FOR SCREENING MAMMOGRAM: ICD-10-CM

## 2018-11-12 PROCEDURE — 77063 BREAST TOMOSYNTHESIS BI: CPT

## 2019-01-09 DIAGNOSIS — I10 BENIGN ESSENTIAL HYPERTENSION: Chronic | ICD-10-CM

## 2019-01-09 NOTE — TELEPHONE ENCOUNTER
"Requested Prescriptions   Pending Prescriptions Disp Refills     lisinopril (PRINIVIL/ZESTRIL) 40 MG tablet [Pharmacy Med Name: LISINOPRIL 40 MG TABLET]  Last Written Prescription Date:  4/23/2018  Last Fill Quantity: 90 tablet,  # refills: 2   Last office visit: 6/16/2018 with prescribing provider:  Faith   Future Office Visit:     90 tablet 2     Sig: TAKE 1 TABLET (40 MG) BY MOUTH DAILY    ACE Inhibitors (Including Combos) Protocol Passed - 1/9/2019  1:41 AM       Passed - Blood pressure under 140/90 in past 12 months    BP Readings from Last 3 Encounters:   06/16/18 130/76   03/10/18 128/70   02/19/18 166/76                Passed - Recent (12 mo) or future (30 days) visit within the authorizing provider's specialty    Patient had office visit in the last 12 months or has a visit in the next 30 days with authorizing provider or within the authorizing provider's specialty.  See \"Patient Info\" tab in inbasket, or \"Choose Columns\" in Meds & Orders section of the refill encounter.             Passed - Medication is active on med list       Passed - Patient is age 18 or older       Passed - No active pregnancy on record       Passed - Normal serum creatinine on file in past 12 months    Recent Labs   Lab Test 06/16/18  0832   CR 0.67            Passed - Normal serum potassium on file in past 12 months    Recent Labs   Lab Test 06/16/18  0832   POTASSIUM 4.0            Passed - No positive pregnancy test within past 12 months           "

## 2019-01-11 RX ORDER — LISINOPRIL 40 MG/1
40 TABLET ORAL DAILY
Qty: 90 TABLET | Refills: 1 | Status: SHIPPED | OUTPATIENT
Start: 2019-01-11 | End: 2019-06-10

## 2019-01-11 NOTE — TELEPHONE ENCOUNTER
Prescription approved per [facility] Refill Protocol for 12 months of refills since last appointment, which was 6/16/2018.

## 2019-06-04 DIAGNOSIS — F41.0 PANIC ATTACK: ICD-10-CM

## 2019-06-04 NOTE — TELEPHONE ENCOUNTER
"Requested Prescriptions   Pending Prescriptions Disp Refills     sertraline (ZOLOFT) 100 MG tablet [Pharmacy Med Name: SERTRALINE  MG TABLET]  Last Written Prescription Date:  3/21/2018  Last Fill Quantity: 90,  # refills: 1   Last office visit: 6/16/2018 with prescribing provider:  Krunal   Future Office Visit:     90 tablet 0     Sig: TAKE 1 TABLET (100 MG) BY MOUTH DAILY       SSRIs Protocol Passed - 6/4/2019  7:29 AM        Passed - Recent (12 mo) or future (30 days) visit within the authorizing provider's specialty     Patient had office visit in the last 12 months or has a visit in the next 30 days with authorizing provider or within the authorizing provider's specialty.  See \"Patient Info\" tab in inbasket, or \"Choose Columns\" in Meds & Orders section of the refill encounter.              Passed - Medication is active on med list        Passed - Patient is age 18 or older        Passed - No active pregnancy on record        Passed - No positive pregnancy test in last 12 months          "

## 2019-06-04 NOTE — TELEPHONE ENCOUNTER
Routing refill request to provider for review/approval because:  Appears patient is not taking as prescribed    Left VM for patient to call triage and clarify how pt is taking medication and to schedule office visit.

## 2019-06-05 RX ORDER — SERTRALINE HYDROCHLORIDE 100 MG/1
100 TABLET, FILM COATED ORAL DAILY
Qty: 90 TABLET | Refills: 0 | Status: SHIPPED | OUTPATIENT
Start: 2019-06-05 | End: 2019-06-10

## 2019-06-10 ENCOUNTER — OFFICE VISIT (OUTPATIENT)
Dept: FAMILY MEDICINE | Facility: CLINIC | Age: 69
End: 2019-06-10
Payer: COMMERCIAL

## 2019-06-10 VITALS
DIASTOLIC BLOOD PRESSURE: 72 MMHG | BODY MASS INDEX: 29.08 KG/M2 | OXYGEN SATURATION: 98 % | HEART RATE: 77 BPM | WEIGHT: 158 LBS | HEIGHT: 62 IN | SYSTOLIC BLOOD PRESSURE: 138 MMHG | TEMPERATURE: 98.2 F | RESPIRATION RATE: 16 BRPM

## 2019-06-10 DIAGNOSIS — Z78.0 ASYMPTOMATIC POSTMENOPAUSAL STATUS: ICD-10-CM

## 2019-06-10 DIAGNOSIS — M85.80 OSTEOPENIA, UNSPECIFIED LOCATION: ICD-10-CM

## 2019-06-10 DIAGNOSIS — F41.9 ANXIETY: ICD-10-CM

## 2019-06-10 DIAGNOSIS — I10 BENIGN ESSENTIAL HYPERTENSION: ICD-10-CM

## 2019-06-10 DIAGNOSIS — Z00.00 ROUTINE MEDICAL EXAM: Primary | ICD-10-CM

## 2019-06-10 DIAGNOSIS — E55.9 VITAMIN D DEFICIENCY: ICD-10-CM

## 2019-06-10 DIAGNOSIS — E78.5 HYPERLIPIDEMIA WITH TARGET LDL LESS THAN 100: ICD-10-CM

## 2019-06-10 DIAGNOSIS — F41.0 PANIC ATTACK: ICD-10-CM

## 2019-06-10 PROCEDURE — G0439 PPPS, SUBSEQ VISIT: HCPCS | Performed by: FAMILY MEDICINE

## 2019-06-10 RX ORDER — LISINOPRIL 40 MG/1
40 TABLET ORAL DAILY
Qty: 90 TABLET | Refills: 1 | Status: SHIPPED | OUTPATIENT
Start: 2019-06-10 | End: 2020-02-18

## 2019-06-10 RX ORDER — SERTRALINE HYDROCHLORIDE 100 MG/1
100 TABLET, FILM COATED ORAL DAILY
Qty: 90 TABLET | Refills: 1 | Status: SHIPPED | OUTPATIENT
Start: 2019-06-10 | End: 2020-02-18

## 2019-06-10 ASSESSMENT — ANXIETY QUESTIONNAIRES
7. FEELING AFRAID AS IF SOMETHING AWFUL MIGHT HAPPEN: NOT AT ALL
GAD7 TOTAL SCORE: 2
6. BECOMING EASILY ANNOYED OR IRRITABLE: MORE THAN HALF THE DAYS
3. WORRYING TOO MUCH ABOUT DIFFERENT THINGS: NOT AT ALL
IF YOU CHECKED OFF ANY PROBLEMS ON THIS QUESTIONNAIRE, HOW DIFFICULT HAVE THESE PROBLEMS MADE IT FOR YOU TO DO YOUR WORK, TAKE CARE OF THINGS AT HOME, OR GET ALONG WITH OTHER PEOPLE: NOT DIFFICULT AT ALL
2. NOT BEING ABLE TO STOP OR CONTROL WORRYING: NOT AT ALL
5. BEING SO RESTLESS THAT IT IS HARD TO SIT STILL: NOT AT ALL
1. FEELING NERVOUS, ANXIOUS, OR ON EDGE: NOT AT ALL

## 2019-06-10 ASSESSMENT — PATIENT HEALTH QUESTIONNAIRE - PHQ9
5. POOR APPETITE OR OVEREATING: NOT AT ALL
SUM OF ALL RESPONSES TO PHQ QUESTIONS 1-9: 0

## 2019-06-10 ASSESSMENT — MIFFLIN-ST. JEOR: SCORE: 1181.99

## 2019-06-10 NOTE — PROGRESS NOTES
"SUBJECTIVE:   Marciano Akins is a 68 year old female who presents for Preventive Visit.  click delete button to remove this line now  click delete button to remove this line now  Are you in the first 12 months of your Medicare Part B coverage?  No    Physical Health:    In general, how would you rate your overall physical health? good    Outside of work, how many days during the week do you exercise? none    Outside of work, approximately how many minutes a day do you exercise?not applicable    If you drink alcohol do you typically have >3 drinks per day or >7 drinks per week? No    Do you usually eat at least 4 servings of fruit and vegetables a day, include whole grains & fiber and avoid regularly eating high fat or \"junk\" foods? Yes    Do you have any problems taking medications regularly?  No    Do you have any side effects from medications? none    Needs assistance for the following daily activities: no assistance needed    Which of the following safety concerns are present in your home?  none identified     Hearing impairment: No    In the past 6 months, have you been bothered by leaking of urine? no    Mental Health:    In general, how would you rate your overall mental or emotional health? good  PHQ-2 Score:      Do you feel safe in your environment? Yes    Do you have a Health Care Directive? No: Advance care planning was reviewed with patient; patient declined at this time.    Additional concerns to address?  No    Fall risk:  Fallen 2 or more times in the past year?: No  Any fall with injury in the past year?: No  click delete button to remove this line now  Cognitive Screenin) Repeat 3 items (Leader, Season, Table)    2) Clock draw: ABNORMAL   3) 3 item recall: Recalls 3 objects  Results: 3 items recalled: COGNITIVE IMPAIRMENT LESS LIKELY    Mini-CogTM Copyright S Ivon. Licensed by the author for use in [facility]; reprinted with permission ([email or web address]@.[email or web address]). All rights reserved.  "     Do you have sleep apnea, excessive snoring or daytime drowsiness?: no      Reviewed and updated as needed this visit by clinical staff  Tobacco  Allergies  Meds  Med Hx  Surg Hx  Fam Hx  Soc Hx        Reviewed and updated as needed this visit by Provider        Social History     Tobacco Use     Smoking status: Never Smoker     Smokeless tobacco: Never Used   Substance Use Topics     Alcohol use: No     Alcohol/week: 0.0 oz                           Current providers sharing in care for this patient include:   Patient Care Team:  Mayur Hector MD as PCP - General (Family Practice)  Sara Estrada MD (Family Practice)  Genny Cuevas DO as Assigned PCP    The following health maintenance items are reviewed in Epic and correct as of today:  Health Maintenance   Topic Date Due     ZOSTER IMMUNIZATION (1 of 2) 06/19/1998     DEXA  10/26/2013     MALLORIE ASSESSMENT  03/10/2019     MEDICARE ANNUAL WELLNESS VISIT  06/16/2019     INFLUENZA VACCINE (Season Ended) 09/01/2019     ASTHMA CONTROL TEST  12/10/2019     PHQ-9  06/10/2020     MAMMO SCREENING  11/12/2020     COLONOSCOPY  10/20/2021     LIPID  12/04/2022     ADVANCED DIRECTIVE PLANNING  06/16/2023     DTAP/TDAP/TD IMMUNIZATION (2 - Td) 10/30/2025     HEPATITIS C SCREENING  Completed     PNEUMOCOCCAL IMMUNIZATION 65+ LOW/MEDIUM RISK  Completed     ASTHMA ACTION PLAN  Addressed     IPV IMMUNIZATION  Aged Out     MENINGITIS IMMUNIZATION  Aged Out     Patient Active Problem List   Diagnosis     Health Care Home     Hyperlipidemia with target LDL less than 100     Pulmonary nodules     Osteopenia     Thyroid nodule     Vitamin D deficiency     Benign essential hypertension     Anxiety     Counseling on health care directive     History reviewed. No pertinent surgical history.    Social History     Tobacco Use     Smoking status: Never Smoker     Smokeless tobacco: Never Used   Substance Use Topics     Alcohol use: No     Alcohol/week:  "0.0 oz     History reviewed. No pertinent family history.          ROS:  CONSTITUTIONAL: NEGATIVE for fever, chills, change in weight  INTEGUMENTARY/SKIN: NEGATIVE for worrisome rashes, moles or lesions  EYES: NEGATIVE for vision changes or irritation  ENT/MOUTH: NEGATIVE for ear, mouth and throat problems  RESP: NEGATIVE for significant cough or SOB  BREAST: NEGATIVE for masses, tenderness or discharge  CV: NEGATIVE for chest pain, palpitations or peripheral edema  GI: NEGATIVE for nausea, abdominal pain, heartburn, or change in bowel habits  : NEGATIVE for frequency, dysuria, or hematuria  MUSCULOSKELETAL: NEGATIVE for significant arthralgias or myalgia  NEURO: NEGATIVE for weakness, dizziness or paresthesias  ENDOCRINE: NEGATIVE for temperature intolerance, skin/hair changes  HEME: NEGATIVE for bleeding problems  PSYCHIATRIC: NEGATIVE for changes in mood or affect    OBJECTIVE:   /72 (Cuff Size: Adult Large)   Pulse 77   Temp 98.2  F (36.8  C) (Tympanic)   Resp 16   Ht 1.562 m (5' 1.5\")   Wt 71.7 kg (158 lb)   SpO2 98%   BMI 29.37 kg/m   Estimated body mass index is 29.37 kg/m  as calculated from the following:    Height as of this encounter: 1.562 m (5' 1.5\").    Weight as of this encounter: 71.7 kg (158 lb).  EXAM:   GENERAL APPEARANCE: healthy, alert and no distress  EYES: Eyes grossly normal to inspection, PERRL and conjunctivae and sclerae normal  HENT: ear canals and TM's normal, nose and mouth without ulcers or lesions, oropharynx clear and oral mucous membranes moist  NECK: no adenopathy, no asymmetry, masses, or scars and thyroid normal to palpation  RESP: lungs clear to auscultation - no rales, rhonchi or wheezes  BREAST: normal without masses, tenderness or nipple discharge and no palpable axillary masses or adenopathy  CV: regular rate and rhythm, normal S1 S2, no S3 or S4, no murmur, click or rub, no peripheral edema and peripheral pulses strong  ABDOMEN: soft, nontender, no " "hepatosplenomegaly, no masses and bowel sounds normal  MS: no musculoskeletal defects are noted and gait is age appropriate without ataxia  SKIN: no suspicious lesions or rashes  NEURO: Normal strength and tone, sensory exam grossly normal, mentation intact and speech normal  PSYCH: mentation appears normal and affect normal/bright        ASSESSMENT / PLAN:       ICD-10-CM    1. Routine medical exam Z00.00    2. Osteopenia, unspecified location M85.80    3. Asymptomatic postmenopausal status Z78.0 DEXA HIP/PELVIS/SPINE - Future   4. Benign essential hypertension I10 CBC with platelets differential     Comprehensive metabolic panel     UA with Microscopic     lisinopril (PRINIVIL/ZESTRIL) 40 MG tablet     CANCELED: UA with Microscopic     CANCELED: CBC with platelets differential     CANCELED: Comprehensive metabolic panel   5. Hyperlipidemia with target LDL less than 100 E78.5 Lipid Profile     CANCELED: Lipid Profile   6. Vitamin D deficiency E55.9 Vitamin D Deficiency   7. Anxiety F41.9    8. Panic attack F41.0 sertraline (ZOLOFT) 100 MG tablet       End of Life Planning:  Patient currently has an advanced directive: No.  I have verified the patient's ablity to prepare an advanced directive/make health care decisions.  Literature was provided to assist patient in preparing an advanced directive.    COUNSELING:  Reviewed preventive health counseling, as reflected in patient instructions       Regular exercise       Healthy diet/nutrition    Estimated body mass index is 29.37 kg/m  as calculated from the following:    Height as of this encounter: 1.562 m (5' 1.5\").    Weight as of this encounter: 71.7 kg (158 lb).         reports that she has never smoked. She has never used smokeless tobacco.      Appropriate preventive services were discussed with this patient, including applicable screening as appropriate for cardiovascular disease, diabetes, osteopenia/osteoporosis, and glaucoma.  As appropriate for age/gender, " discussed screening for colorectal cancer, prostate cancer, breast cancer, and cervical cancer. Checklist reviewing preventive services available has been given to the patient.    Reviewed patients plan of care and provided an AVS. The Basic Care Plan (routine screening as documented in Health Maintenance) for Marciano meets the Care Plan requirement. This Care Plan has been established and reviewed with the Patient.    Counseling Resources:  ATP IV Guidelines  Pooled Cohorts Equation Calculator  Breast Cancer Risk Calculator  FRAX Risk Assessment  ICSI Preventive Guidelines  Dietary Guidelines for Americans, 2010  USDA's MyPlate  ASA Prophylaxis  Lung CA Screening    Mayur Hector MD  [facility]

## 2019-06-10 NOTE — PATIENT INSTRUCTIONS
Patient Education   Personalized Prevention Plan  You are due for the preventive services outlined below.  Your care team is available to assist you in scheduling these services.  If you have already completed any of these items, please share that information with your care team to update in your medical record.  Health Maintenance Due   Topic Date Due     Asthma Action Plan  1950     Asthma Control Test  1950     Zoster (Shingles) Vaccine (1 of 2) 06/19/1998     Osteoporosis Screening  10/26/2013     Anxiety Assessment  03/10/2019     Depression Assessment  03/10/2019     Annual Wellness Visit  06/16/2019

## 2019-06-11 ASSESSMENT — ANXIETY QUESTIONNAIRES: GAD7 TOTAL SCORE: 2

## 2019-06-12 ENCOUNTER — ANCILLARY PROCEDURE (OUTPATIENT)
Dept: BONE DENSITY | Facility: CLINIC | Age: 69
End: 2019-06-12
Attending: FAMILY MEDICINE
Payer: COMMERCIAL

## 2019-06-12 DIAGNOSIS — E55.9 VITAMIN D DEFICIENCY: ICD-10-CM

## 2019-06-12 DIAGNOSIS — Z78.0 ASYMPTOMATIC POSTMENOPAUSAL STATUS: ICD-10-CM

## 2019-06-12 DIAGNOSIS — I10 BENIGN ESSENTIAL HYPERTENSION: ICD-10-CM

## 2019-06-12 DIAGNOSIS — E78.5 HYPERLIPIDEMIA WITH TARGET LDL LESS THAN 100: ICD-10-CM

## 2019-06-12 LAB
ALBUMIN SERPL-MCNC: 3.7 G/DL (ref 3.4–5)
ALBUMIN UR-MCNC: NEGATIVE MG/DL
ALP SERPL-CCNC: 109 U/L (ref 40–150)
ALT SERPL W P-5'-P-CCNC: 17 U/L (ref 0–50)
ANION GAP SERPL CALCULATED.3IONS-SCNC: 10 MMOL/L (ref 3–14)
APPEARANCE UR: CLEAR
AST SERPL W P-5'-P-CCNC: 16 U/L (ref 0–45)
BASOPHILS # BLD AUTO: 0.1 10E9/L (ref 0–0.2)
BASOPHILS NFR BLD AUTO: 1 %
BILIRUB SERPL-MCNC: 0.6 MG/DL (ref 0.2–1.3)
BILIRUB UR QL STRIP: NEGATIVE
BUN SERPL-MCNC: 21 MG/DL (ref 7–30)
CALCIUM SERPL-MCNC: 8.5 MG/DL (ref 8.5–10.1)
CHLORIDE SERPL-SCNC: 108 MMOL/L (ref 94–109)
CHOLEST SERPL-MCNC: 271 MG/DL
CO2 SERPL-SCNC: 22 MMOL/L (ref 20–32)
COLOR UR AUTO: YELLOW
CREAT SERPL-MCNC: 0.71 MG/DL (ref 0.52–1.04)
DEPRECATED CALCIDIOL+CALCIFEROL SERPL-MC: 11 UG/L (ref 20–75)
DIFFERENTIAL METHOD BLD: NORMAL
EOSINOPHIL # BLD AUTO: 0.2 10E9/L (ref 0–0.7)
EOSINOPHIL NFR BLD AUTO: 3.5 %
ERYTHROCYTE [DISTWIDTH] IN BLOOD BY AUTOMATED COUNT: 13.7 % (ref 10–15)
GFR SERPL CREATININE-BSD FRML MDRD: 86 ML/MIN/{1.73_M2}
GLUCOSE SERPL-MCNC: 91 MG/DL (ref 70–99)
GLUCOSE UR STRIP-MCNC: NEGATIVE MG/DL
HCT VFR BLD AUTO: 42.6 % (ref 35–47)
HDLC SERPL-MCNC: 36 MG/DL
HGB BLD-MCNC: 14.9 G/DL (ref 11.7–15.7)
HGB UR QL STRIP: ABNORMAL
KETONES UR STRIP-MCNC: NEGATIVE MG/DL
LDLC SERPL CALC-MCNC: 198 MG/DL
LEUKOCYTE ESTERASE UR QL STRIP: NEGATIVE
LYMPHOCYTES # BLD AUTO: 2.3 10E9/L (ref 0.8–5.3)
LYMPHOCYTES NFR BLD AUTO: 38.2 %
MCH RBC QN AUTO: 29.7 PG (ref 26.5–33)
MCHC RBC AUTO-ENTMCNC: 35 G/DL (ref 31.5–36.5)
MCV RBC AUTO: 85 FL (ref 78–100)
MONOCYTES # BLD AUTO: 0.6 10E9/L (ref 0–1.3)
MONOCYTES NFR BLD AUTO: 10.2 %
NEUTROPHILS # BLD AUTO: 2.8 10E9/L (ref 1.6–8.3)
NEUTROPHILS NFR BLD AUTO: 47.1 %
NITRATE UR QL: NEGATIVE
NONHDLC SERPL-MCNC: 235 MG/DL
PH UR STRIP: 5.5 PH (ref 5–7)
PLATELET # BLD AUTO: 239 10E9/L (ref 150–450)
POTASSIUM SERPL-SCNC: 3.8 MMOL/L (ref 3.4–5.3)
PROT SERPL-MCNC: 7.6 G/DL (ref 6.8–8.8)
RBC # BLD AUTO: 5.01 10E12/L (ref 3.8–5.2)
RBC #/AREA URNS AUTO: ABNORMAL /HPF
SODIUM SERPL-SCNC: 140 MMOL/L (ref 133–144)
SOURCE: ABNORMAL
SP GR UR STRIP: 1.02 (ref 1–1.03)
TRIGL SERPL-MCNC: 183 MG/DL
UROBILINOGEN UR STRIP-ACNC: 1 EU/DL (ref 0.2–1)
WBC # BLD AUTO: 6 10E9/L (ref 4–11)
WBC #/AREA URNS AUTO: ABNORMAL /HPF

## 2019-06-12 PROCEDURE — 85025 COMPLETE CBC W/AUTO DIFF WBC: CPT | Performed by: FAMILY MEDICINE

## 2019-06-12 PROCEDURE — 82306 VITAMIN D 25 HYDROXY: CPT | Performed by: FAMILY MEDICINE

## 2019-06-12 PROCEDURE — 36415 COLL VENOUS BLD VENIPUNCTURE: CPT | Performed by: FAMILY MEDICINE

## 2019-06-12 PROCEDURE — 77080 DXA BONE DENSITY AXIAL: CPT | Performed by: FAMILY MEDICINE

## 2019-06-12 PROCEDURE — 80061 LIPID PANEL: CPT | Performed by: FAMILY MEDICINE

## 2019-06-12 PROCEDURE — 81001 URINALYSIS AUTO W/SCOPE: CPT | Performed by: FAMILY MEDICINE

## 2019-06-12 PROCEDURE — 80053 COMPREHEN METABOLIC PANEL: CPT | Performed by: FAMILY MEDICINE

## 2019-12-23 ENCOUNTER — HOSPITAL ENCOUNTER (OUTPATIENT)
Dept: MAMMOGRAPHY | Facility: CLINIC | Age: 69
Discharge: HOME | End: 2019-12-23
Attending: FAMILY MEDICINE | Admitting: FAMILY MEDICINE
Payer: COMMERCIAL

## 2019-12-23 DIAGNOSIS — Z12.31 VISIT FOR SCREENING MAMMOGRAM: ICD-10-CM

## 2019-12-23 PROCEDURE — 77063 BREAST TOMOSYNTHESIS BI: CPT

## 2020-02-18 ENCOUNTER — OFFICE VISIT (OUTPATIENT)
Dept: FAMILY MEDICINE | Facility: CLINIC | Age: 70
End: 2020-02-18
Payer: COMMERCIAL

## 2020-02-18 VITALS
HEART RATE: 68 BPM | WEIGHT: 150 LBS | HEIGHT: 61 IN | RESPIRATION RATE: 16 BRPM | TEMPERATURE: 97.9 F | SYSTOLIC BLOOD PRESSURE: 156 MMHG | BODY MASS INDEX: 28.32 KG/M2 | DIASTOLIC BLOOD PRESSURE: 86 MMHG | OXYGEN SATURATION: 98 %

## 2020-02-18 DIAGNOSIS — Z00.00 ROUTINE MEDICAL EXAM: Primary | ICD-10-CM

## 2020-02-18 DIAGNOSIS — F41.0 PANIC ATTACK: ICD-10-CM

## 2020-02-18 DIAGNOSIS — E78.5 HYPERLIPIDEMIA WITH TARGET LDL LESS THAN 100: ICD-10-CM

## 2020-02-18 DIAGNOSIS — F41.9 ANXIETY: ICD-10-CM

## 2020-02-18 DIAGNOSIS — E55.9 VITAMIN D DEFICIENCY: ICD-10-CM

## 2020-02-18 DIAGNOSIS — I10 BENIGN ESSENTIAL HYPERTENSION: Chronic | ICD-10-CM

## 2020-02-18 LAB
ALBUMIN UR-MCNC: NEGATIVE MG/DL
APPEARANCE UR: CLEAR
BASOPHILS # BLD AUTO: 0.1 10E9/L (ref 0–0.2)
BASOPHILS NFR BLD AUTO: 1.3 %
BILIRUB UR QL STRIP: NEGATIVE
COLOR UR AUTO: YELLOW
DIFFERENTIAL METHOD BLD: NORMAL
EOSINOPHIL # BLD AUTO: 0.2 10E9/L (ref 0–0.7)
EOSINOPHIL NFR BLD AUTO: 3 %
ERYTHROCYTE [DISTWIDTH] IN BLOOD BY AUTOMATED COUNT: 14 % (ref 10–15)
GLUCOSE UR STRIP-MCNC: NEGATIVE MG/DL
HCT VFR BLD AUTO: 44.5 % (ref 35–47)
HGB BLD-MCNC: 14.3 G/DL (ref 11.7–15.7)
HGB UR QL STRIP: ABNORMAL
KETONES UR STRIP-MCNC: NEGATIVE MG/DL
LEUKOCYTE ESTERASE UR QL STRIP: NEGATIVE
LYMPHOCYTES # BLD AUTO: 2.5 10E9/L (ref 0.8–5.3)
LYMPHOCYTES NFR BLD AUTO: 35.8 %
MCH RBC QN AUTO: 27.8 PG (ref 26.5–33)
MCHC RBC AUTO-ENTMCNC: 32.1 G/DL (ref 31.5–36.5)
MCV RBC AUTO: 86 FL (ref 78–100)
MONOCYTES # BLD AUTO: 0.7 10E9/L (ref 0–1.3)
MONOCYTES NFR BLD AUTO: 10.6 %
NEUTROPHILS # BLD AUTO: 3.5 10E9/L (ref 1.6–8.3)
NEUTROPHILS NFR BLD AUTO: 49.3 %
NITRATE UR QL: NEGATIVE
NON-SQ EPI CELLS #/AREA URNS LPF: ABNORMAL /LPF
PH UR STRIP: 5.5 PH (ref 5–7)
PLATELET # BLD AUTO: 225 10E9/L (ref 150–450)
RBC # BLD AUTO: 5.15 10E12/L (ref 3.8–5.2)
RBC #/AREA URNS AUTO: ABNORMAL /HPF
SOURCE: ABNORMAL
SP GR UR STRIP: 1.02 (ref 1–1.03)
UROBILINOGEN UR STRIP-ACNC: 0.2 EU/DL (ref 0.2–1)
WBC # BLD AUTO: 7 10E9/L (ref 4–11)
WBC #/AREA URNS AUTO: ABNORMAL /HPF

## 2020-02-18 PROCEDURE — 80061 LIPID PANEL: CPT | Performed by: FAMILY MEDICINE

## 2020-02-18 PROCEDURE — 99397 PER PM REEVAL EST PAT 65+ YR: CPT | Performed by: FAMILY MEDICINE

## 2020-02-18 PROCEDURE — 81001 URINALYSIS AUTO W/SCOPE: CPT | Performed by: FAMILY MEDICINE

## 2020-02-18 PROCEDURE — 36415 COLL VENOUS BLD VENIPUNCTURE: CPT | Performed by: FAMILY MEDICINE

## 2020-02-18 PROCEDURE — 85025 COMPLETE CBC W/AUTO DIFF WBC: CPT | Performed by: FAMILY MEDICINE

## 2020-02-18 PROCEDURE — 99213 OFFICE O/P EST LOW 20 MIN: CPT | Mod: 25 | Performed by: FAMILY MEDICINE

## 2020-02-18 PROCEDURE — 80053 COMPREHEN METABOLIC PANEL: CPT | Performed by: FAMILY MEDICINE

## 2020-02-18 RX ORDER — AMLODIPINE BESYLATE 5 MG/1
5 TABLET ORAL DAILY
Qty: 30 TABLET | Refills: 1 | Status: SHIPPED | OUTPATIENT
Start: 2020-02-18 | End: 2020-03-12

## 2020-02-18 RX ORDER — LISINOPRIL 40 MG/1
40 TABLET ORAL DAILY
Qty: 90 TABLET | Refills: 1 | Status: SHIPPED | OUTPATIENT
Start: 2020-02-18 | End: 2020-09-08

## 2020-02-18 RX ORDER — SERTRALINE HYDROCHLORIDE 100 MG/1
100 TABLET, FILM COATED ORAL DAILY
Qty: 90 TABLET | Refills: 1 | Status: SHIPPED | OUTPATIENT
Start: 2020-02-18 | End: 2020-09-08

## 2020-02-18 ASSESSMENT — MIFFLIN-ST. JEOR: SCORE: 1136.74

## 2020-02-18 ASSESSMENT — ACTIVITIES OF DAILY LIVING (ADL): CURRENT_FUNCTION: NO ASSISTANCE NEEDED

## 2020-02-18 NOTE — PROGRESS NOTES
"SUBJECTIVE:   Marciano Akins is a 69 year old female who presents for Preventive Visit.  Are you in the first 12 months of your Medicare coverage?  No    Healthy Habits:     In general, how would you rate your overall health?  Good    Frequency of exercise:  None    Do you usually eat at least 4 servings of fruit and vegetables a day, include whole grains    & fiber and avoid regularly eating high fat or \"junk\" foods?  Yes    Taking medications regularly:  Yes    Medication side effects:  None    Ability to successfully perform activities of daily living:  No assistance needed    Home Safety:  Lack of grab bars in the bathroom, lack of handrails on stairs and no safety concerns identified    Hearing Impairment:  No hearing concerns    In the past 6 months, have you been bothered by leaking of urine?  No    In general, how would you rate your overall mental or emotional health?  Good      PHQ-2 Total Score: 2    Additional concerns today:  No    Do you feel safe in your environment? Yes    Have you ever done Advance Care Planning? (For example, a Health Directive, POLST, or a discussion with a medical provider or your loved ones about your wishes): No, advance care planning information given to patient to review.  Patient declined advance care planning discussion at this time.      Fall risk  Fallen 2 or more times in the past year?: No  Any fall with injury in the past year?: No    Cognitive Screening   1) Repeat 3 items (Leader, Season, Table)    2) Clock draw: ABNORMAL   3) 3 item recall: Recalls 3 objects  Results: 3 items recalled: COGNITIVE IMPAIRMENT LESS LIKELY    Mini-CogTM Copyright EM[employer] Del [employer]le. Licensed by the author for use in [facility]; reprinted with permission ([email or web address]). All rights reserved.      Do you have sleep apnea, excessive snoring or daytime drowsiness?: no    Reviewed and updated as needed this visit by clinical staff  Tobacco  Allergies  Meds  Med Hx  Surg Hx  Fam Hx  " Soc Hx        Reviewed and updated as needed this visit by Provider        Social History     Tobacco Use     Smoking status: Never Smoker     Smokeless tobacco: Never Used   Substance Use Topics     Alcohol use: No     Alcohol/week: 0.0 standard drinks         Alcohol Use 2/18/2020   Prescreen: >3 drinks/day or >7 drinks/week? No   Prescreen: >3 drinks/day or >7 drinks/week? -               Current providers sharing in care for this patient include:   Patient Care Team:  Mayur Hector MD as PCP - General (Family Practice)  Saar Estrada MD (Family Practice)  Mayur Hector MD as Assigned PCP    The following health maintenance items are reviewed in Epic and correct as of today:  Health Maintenance   Topic Date Due     ZOSTER IMMUNIZATION (1 of 2) 06/19/1998     INFLUENZA VACCINE (1) 09/01/2019     ASTHMA CONTROL TEST  12/10/2019     PHQ-2  01/01/2020     MEDICARE ANNUAL WELLNESS VISIT  06/10/2020     MALLORIE ASSESSMENT  06/10/2020     ASTHMA ACTION PLAN  06/10/2020     FALL RISK ASSESSMENT  06/10/2020     PHQ-9  06/10/2020     COLONOSCOPY  10/20/2021     MAMMO SCREENING  12/23/2021     ADVANCE CARE PLANNING  06/16/2023     LIPID  06/12/2024     DTAP/TDAP/TD IMMUNIZATION (2 - Td) 10/30/2025     DEXA  Completed     HEPATITIS C SCREENING  Completed     PNEUMOCOCCAL IMMUNIZATION 65+ LOW/MEDIUM RISK  Completed     IPV IMMUNIZATION  Aged Out     MENINGITIS IMMUNIZATION  Aged Out     Patient Active Problem List   Diagnosis     Health Care Home     Hyperlipidemia with target LDL less than 100     Pulmonary nodules     Osteopenia     Thyroid nodule     Vitamin D deficiency     Benign essential hypertension     Anxiety     Counseling on health care directive     History reviewed. No pertinent surgical history.    Social History     Tobacco Use     Smoking status: Never Smoker     Smokeless tobacco: Never Used   Substance Use Topics     Alcohol use: No     Alcohol/week: 0.0 standard drinks      "History reviewed. No pertinent family history.          Review of Systems  Constitutional, HEENT, cardiovascular, pulmonary, gi and gu systems are negative, except as otherwise noted.    OBJECTIVE:   BP (!) 156/86   Pulse 68   Temp 97.9  F (36.6  C) (Tympanic)   Resp 16   Ht 1.556 m (5' 1.25\")   Wt 68 kg (150 lb)   SpO2 98%   BMI 28.11 kg/m   Estimated body mass index is 28.11 kg/m  as calculated from the following:    Height as of this encounter: 1.556 m (5' 1.25\").    Weight as of this encounter: 68 kg (150 lb).  Physical Exam  GENERAL APPEARANCE: healthy, alert and no distress  EYES: Eyes grossly normal to inspection, PERRL and conjunctivae and sclerae normal  HENT: ear canals and TM's normal, nose and mouth without ulcers or lesions, oropharynx clear and oral mucous membranes moist  NECK: no adenopathy, no asymmetry, masses, or scars and thyroid normal to palpation  RESP: lungs clear to auscultation - no rales, rhonchi or wheezes  CV: regular rate and rhythm, normal S1 S2, no S3 or S4, no murmur, click or rub, no peripheral edema and peripheral pulses strong  ABDOMEN: soft, nontender, no hepatosplenomegaly, no masses and bowel sounds normal  MS: no musculoskeletal defects are noted and gait is age appropriate without ataxia  SKIN: no suspicious lesions or rashes  NEURO: Normal strength and tone, sensory exam grossly normal, mentation intact and speech normal  PSYCH: mentation appears normal and affect normal/bright        ASSESSMENT / PLAN:       ICD-10-CM    1. Routine medical exam Z00.00    2. Benign essential hypertension I10 amLODIPine 5 MG PO tablet     UA with Microscopic     CBC with platelets differential     Comprehensive metabolic panel   3. Hyperlipidemia with target LDL less than 100 E78.5 Lipid Profile   4. Anxiety F41.9    5. Vitamin D deficiency E55.9        COUNSELING:  Reviewed preventive health counseling, as reflected in patient instructions       Regular exercise       Healthy " "diet/nutrition       Immunizations    Declined: Influenza due to Other         I added amlodipine 5 mg daily to the patient's blood pressure regimen.  She has been checking her blood pressures at home on a new meter and her blood pressures have been anywhere from 160- 200 systolically and in the low 90s diastolic.  She will return in 1 month for a blood pressure check.  Labs were ordered as noted.  Sertraline and lisinopril were both refilled.    Estimated body mass index is 28.11 kg/m  as calculated from the following:    Height as of this encounter: 1.556 m (5' 1.25\").    Weight as of this encounter: 68 kg (150 lb).         reports that she has never smoked. She has never used smokeless tobacco.      Appropriate preventive services were discussed with this patient, including applicable screening as appropriate for cardiovascular disease, diabetes, osteopenia/osteoporosis, and glaucoma.  As appropriate for age/gender, discussed screening for colorectal cancer, prostate cancer, breast cancer, and cervical cancer. Checklist reviewing preventive services available has been given to the patient.    Reviewed patients plan of care and provided an AVS. The Basic Care Plan (routine screening as documented in Health Maintenance) for Marciano meets the Care Plan requirement. This Care Plan has been established and reviewed with the Patient.    Counseling Resources:  ATP IV Guidelines  Pooled Cohorts Equation Calculator  Breast Cancer Risk Calculator  FRAX Risk Assessment  ICSI Preventive Guidelines  Dietary Guidelines for Americans, 2010  USDA's MyPlate  ASA Prophylaxis  Lung CA Screening    Mayur Hector MD  [facility]    Identified Health Risks:  "

## 2020-02-18 NOTE — LETTER
February 19, 2020      Marciano Akins  [address]  [place] [address]        Dear ,    We are writing to inform you of your test results.    The 10-year ASCVD risk score (Adela TELLO Jr., et al., 2013) is: 23%    Values used to calculate the score:      Age: 69 years      Sex: Female      Is Non- : No      Diabetic: No      Tobacco smoker: No      Systolic Blood Pressure: 156 mmHg      Is BP treated: Yes      HDL Cholesterol: 40 mg/dL      Total Cholesterol: 295 mg/dL     As you can see from the above numbers your percentage of chance of having a heart attack or stroke is 23% over the next 10 years.  That is very high and it is recommended that you be on medication for your cholesterol.  The remainder of your tests all look very good.  Let me know how you would like to proceed.    Resulted Orders   UA with Microscopic   Result Value Ref Range    Color Urine Yellow     Appearance Urine Clear     Glucose Urine Negative NEG^Negative mg/dL    Bilirubin Urine Negative NEG^Negative    Ketones Urine Negative NEG^Negative mg/dL    Specific Gravity Urine 1.020 1.003 - 1.035    pH Urine 5.5 5.0 - 7.0 pH    Protein Albumin Urine Negative NEG^Negative mg/dL    Urobilinogen Urine 0.2 0.2 - 1.0 EU/dL    Nitrite Urine Negative NEG^Negative    Blood Urine Small (A) NEG^Negative    Leukocyte Esterase Urine Negative NEG^Negative    Source Midstream Urine     WBC Urine 0 - 5 OTO5^0 - 5 /HPF    RBC Urine O - 2 OTO2^O - 2 /HPF    Squamous Epithelial /LPF Urine Few FEW^Few /LPF   CBC with platelets differential   Result Value Ref Range    WBC 7.0 4.0 - 11.0 10e9/L    RBC Count 5.15 3.8 - 5.2 10e12/L    Hemoglobin 14.3 11.7 - 15.7 g/dL    Hematocrit 44.5 35.0 - 47.0 %    MCV 86 78 - 100 fl    MCH 27.8 26.5 - 33.0 pg    MCHC 32.1 31.5 - 36.5 g/dL    RDW 14.0 10.0 - 15.0 %    Platelet Count 225 150 - 450 10e9/L    Diff Method Automated Method     % Neutrophils 49.3 %    % Lymphocytes  35.8 %    % Monocytes 10.6 %    % Eosinophils 3.0 %    % Basophils 1.3 %    Absolute Neutrophil 3.5 1.6 - 8.3 10e9/L    Absolute Lymphocytes 2.5 0.8 - 5.3 10e9/L    Absolute Monocytes 0.7 0.0 - 1.3 10e9/L    Absolute Eosinophils 0.2 0.0 - 0.7 10e9/L    Absolute Basophils 0.1 0.0 - 0.2 10e9/L   Comprehensive metabolic panel   Result Value Ref Range    Sodium 137 133 - 144 mmol/L    Potassium 3.9 3.4 - 5.3 mmol/L    Chloride 105 94 - 109 mmol/L    Carbon Dioxide 28 20 - 32 mmol/L    Anion Gap 4 3 - 14 mmol/L    Glucose 86 70 - 99 mg/dL      Comment:      Non Fasting    Urea Nitrogen 16 7 - 30 mg/dL    Creatinine 0.70 0.52 - 1.04 mg/dL    GFR Estimate 87 >60 mL/min/[1.73_m2]      Comment:      Non  GFR Calc  Starting 12/18/2018, serum creatinine based estimated GFR (eGFR) will be   calculated using the Chronic Kidney Disease Epidemiology Collaboration   (CKD-EPI) equation.      GFR Estimate If Black >90 >60 mL/min/[1.73_m2]      Comment:       GFR Calc  Starting 12/18/2018, serum creatinine based estimated GFR (eGFR) will be   calculated using the Chronic Kidney Disease Epidemiology Collaboration   (CKD-EPI) equation.      Calcium 8.9 8.5 - 10.1 mg/dL    Bilirubin Total 0.7 0.2 - 1.3 mg/dL    Albumin 3.9 3.4 - 5.0 g/dL    Protein Total 7.9 6.8 - 8.8 g/dL    Alkaline Phosphatase 110 40 - 150 U/L    ALT 19 0 - 50 U/L    AST 16 0 - 45 U/L   Lipid Profile   Result Value Ref Range    Cholesterol 295 (H) <200 mg/dL      Comment:      Desirable:       <200 mg/dl    Triglycerides 128 <150 mg/dL      Comment:      Non Fasting    HDL Cholesterol 40 (L) >49 mg/dL    LDL Cholesterol Calculated 229 (H) <100 mg/dL      Comment:      Above desirable:  100-129 mg/dl  Borderline High:  130-159 mg/dL  High:             160-189 mg/dL  Very high:       >189 mg/dl      Non HDL Cholesterol 255 (H) <130 mg/dL      Comment:      Above Desirable:  130-159 mg/dl  Borderline high:  160-189 mg/dl  High:              190-219 mg/dl  Very high:       >219 mg/dl         If you have any questions or concerns, please call the clinic at the number listed above.       Sincerely,        Mayur Hector MD

## 2020-02-19 LAB
ALBUMIN SERPL-MCNC: 3.9 G/DL (ref 3.4–5)
ALP SERPL-CCNC: 110 U/L (ref 40–150)
ALT SERPL W P-5'-P-CCNC: 19 U/L (ref 0–50)
ANION GAP SERPL CALCULATED.3IONS-SCNC: 4 MMOL/L (ref 3–14)
AST SERPL W P-5'-P-CCNC: 16 U/L (ref 0–45)
BILIRUB SERPL-MCNC: 0.7 MG/DL (ref 0.2–1.3)
BUN SERPL-MCNC: 16 MG/DL (ref 7–30)
CALCIUM SERPL-MCNC: 8.9 MG/DL (ref 8.5–10.1)
CHLORIDE SERPL-SCNC: 105 MMOL/L (ref 94–109)
CHOLEST SERPL-MCNC: 295 MG/DL
CO2 SERPL-SCNC: 28 MMOL/L (ref 20–32)
CREAT SERPL-MCNC: 0.7 MG/DL (ref 0.52–1.04)
GFR SERPL CREATININE-BSD FRML MDRD: 87 ML/MIN/{1.73_M2}
GLUCOSE SERPL-MCNC: 86 MG/DL (ref 70–99)
HDLC SERPL-MCNC: 40 MG/DL
LDLC SERPL CALC-MCNC: 229 MG/DL
NONHDLC SERPL-MCNC: 255 MG/DL
POTASSIUM SERPL-SCNC: 3.9 MMOL/L (ref 3.4–5.3)
PROT SERPL-MCNC: 7.9 G/DL (ref 6.8–8.8)
SODIUM SERPL-SCNC: 137 MMOL/L (ref 133–144)
TRIGL SERPL-MCNC: 128 MG/DL

## 2020-03-17 ENCOUNTER — TELEPHONE (OUTPATIENT)
Dept: FAMILY MEDICINE | Facility: CLINIC | Age: 70
End: 2020-03-17

## 2020-03-17 ENCOUNTER — VIRTUAL VISIT (OUTPATIENT)
Dept: FAMILY MEDICINE | Facility: CLINIC | Age: 70
End: 2020-03-17
Payer: COMMERCIAL

## 2020-03-17 DIAGNOSIS — I10 BENIGN ESSENTIAL HYPERTENSION: Primary | ICD-10-CM

## 2020-03-17 PROCEDURE — G2012 BRIEF CHECK IN BY MD/QHP: HCPCS | Performed by: FAMILY MEDICINE

## 2020-03-17 NOTE — TELEPHONE ENCOUNTER
Reason for Call:  Other call back    Detailed comments: The patient called and stated that she has an appointment scheduled for 3/17 at 2:15 but she does not want to leave the house due to the coronavirus.  She takes her B/P twice a day and today's B/P 150/83 and had been consistent for the past 10-15 days.  She is wondering if this information is enough for her to cancel her appt with Dr. Hector.  She would like a call back to discuss this.      Phone Number Patient can be reached at: Home number on file [phone] (home)    Best Time: Any    Can we leave a detailed message on this number? YES    Call taken on 3/17/2020 at 7:55 AM by Isi Sanchez

## 2020-03-17 NOTE — PROGRESS NOTES
"Marciano Akins is a 69 year old female who is being evaluated via a telephone visit.      The patient has been notified of following (by ETELVINA Pedroza CMA       \"We have found that certain health care needs can be provided without the need for a physical exam.  This service lets us provide the care you need with a short phone conversation.  If a prescription is necessary we can send it directly to your pharmacy.  If lab work is needed we can place an order for that and you can then stop by our lab to have the test done at a later time.    This telephone visit will be conducted via 3 way call with the you (the patient) , the physician/provider, and a me all on the line at the same time.  This allows your physician/provider to have the phone conversation with you while I will be taking notes for your medical record.  We will have full access to your [place] medical record during this entire phone call.    Since this is like an office visit,  will bill your insurance company for this service.  Please check with your medical insurance if this type of telephone/virtual is covered . You may be responsible for the cost of this service if insurance coverage is denied.  The typical cost is $30 (10min), $59(11-20min) and $85 (21-30min)     If during the course of the call the physician/provider feels a telephone visit is not appropriate, you will not be charged for this service\"    Consent has been obtained for this service by care team member: yes.  See the scanned image in the medical record.    S: The history as provided by the patient to the provider during this 3 way call include follow-up on blood pressure    Pertinent parts of the the patient's medical history reviewed and confirmed by the provider included : Blood pressure follow-up    Total time of call between patient and provider was 15 minutes     Mayur Hector M.D. (MD signature)  ===================================================    I have " reviewed the note as documented above.  This accurately captures the substance of my conversation with the patient,    Additional provider notes: Patient has been checking her blood pressure at home.  Her blood pressures have been usually in the 150s systolic and in the 80s diastolic.  We did discuss increasing her amlodipine to 1-1/2 tablets daily.  She will follow-up on her blood pressure in 1 month.  That will be a virtual visit.    Assessment/Plan:

## 2020-03-18 NOTE — PATIENT INSTRUCTIONS
The patient will increase her amlodipine to 1-1/2 tablets daily.  She will follow-up in 1 month and a virtual visit.  Follow-up otherwise will be as needed.  She will continue to monitor her blood pressure at home.

## 2020-04-15 ENCOUNTER — VIRTUAL VISIT (OUTPATIENT)
Dept: FAMILY MEDICINE | Facility: CLINIC | Age: 70
End: 2020-04-15
Payer: COMMERCIAL

## 2020-04-15 DIAGNOSIS — I10 BENIGN ESSENTIAL HYPERTENSION: Primary | ICD-10-CM

## 2020-04-15 PROCEDURE — 99213 OFFICE O/P EST LOW 20 MIN: CPT | Mod: 95 | Performed by: FAMILY MEDICINE

## 2020-04-15 NOTE — PROGRESS NOTES
"Marciano Akins is a 69 year old female who is being evaluated via a billable telephone visit.      The patient has been notified of following:     \"This telephone visit will be conducted via a call between you and your physician/provider. We have found that certain health care needs can be provided without the need for a physical exam.  This service lets us provide the care you need with a short phone conversation.  If a prescription is necessary we can send it directly to your pharmacy.  If lab work is needed we can place an order for that and you can then stop by our lab to have the test done at a later time.    Telephone visits are billed at different rates depending on your insurance coverage. During this emergency period, for some insurers they may be billed the same as an in-person visit.  Please reach out to your insurance provider with any questions.    If during the course of the call the physician/provider feels a telephone visit is not appropriate, you will not be charged for this service.\"    Patient has given verbal consent for Telephone visit?  Yes    How would you like to obtain your AVS? Mail a copy    Subjective     Marciano Akins is a 69 year old female who presents to clinic today for the following health issues:    Hypertension Follow-up      Do you check your blood pressure regularly outside of the clinic? Yes     Are you following a low salt diet? Yes    Are your blood pressures ever more than 140 on the top number (systolic) OR more   than 90 on the bottom number (diastolic), for example 140/90? No   15th 149/73, 14th 147/80, 13th 145/79, 12th 155/70, 11th 142/74, 10th 154/79, 9th 132/74    How many servings of fruits and vegetables do you eat daily?  2-3    On average, how many sweetened beverages do you drink each day (Examples: soda, juice, sweet tea, etc.  Do NOT count diet or artificially sweetened beverages)?   0    How many days per week do you exercise enough to make your heart " beat faster? 3 or less    How many minutes a day do you exercise enough to make your heart beat faster? 9 or less    How many days per week do you miss taking your medication? 0    The patient has tolerated her increased dose of amlodipine without any side effects.             Patient Active Problem List   Diagnosis     Health Care Home     Hyperlipidemia with target LDL less than 100     Pulmonary nodules     Osteopenia     Thyroid nodule     Vitamin D deficiency     Benign essential hypertension     Anxiety     Counseling on health care directive     Pure hypercholesterolemia     No past surgical history on file.    Social History     Tobacco Use     Smoking status: Never Smoker     Smokeless tobacco: Never Used   Substance Use Topics     Alcohol use: No     Alcohol/week: 0.0 standard drinks     No family history on file.        Reviewed and updated as needed this visit by Provider         Review of Systems   ROS COMP: Constitutional, HEENT, cardiovascular, pulmonary, gi and gu systems are negative, except as otherwise noted.       Objective   Reported vitals:  There were no vitals taken for this visit.   alert  PSYCH: Alert and oriented times 3; coherent speech, normal   rate and volume, able to articulate logical thoughts, able   to abstract reason, no tangential thoughts, no hallucinations   or delusions  Her affect is normal and pleasant  RESP: No cough, no audible wheezing, able to talk in full sentences  Remainder of exam unable to be completed due to telephone visits            Assessment/Plan:  1. Benign essential hypertension  I refilled the patient's amlodipine for 90 days.  She will follow-up in 1 month in a virtual visit for her blood pressure.      Return in about 4 weeks (around 5/13/2020) for hypertension, telephone visit.      Phone call duration:  7 minutes    Mayur Hector MD

## 2020-04-21 DIAGNOSIS — I10 BENIGN ESSENTIAL HYPERTENSION: Chronic | ICD-10-CM

## 2020-04-21 RX ORDER — AMLODIPINE BESYLATE 5 MG/1
7.5 TABLET ORAL DAILY
Qty: 45 TABLET | Refills: 5 | Status: SHIPPED | OUTPATIENT
Start: 2020-04-21 | End: 2020-09-09

## 2020-05-12 ENCOUNTER — VIRTUAL VISIT (OUTPATIENT)
Dept: FAMILY MEDICINE | Facility: CLINIC | Age: 70
End: 2020-05-12
Payer: COMMERCIAL

## 2020-05-12 DIAGNOSIS — J00 ACUTE RHINITIS: ICD-10-CM

## 2020-05-12 DIAGNOSIS — I10 BENIGN ESSENTIAL HYPERTENSION: Primary | ICD-10-CM

## 2020-05-12 PROCEDURE — 99213 OFFICE O/P EST LOW 20 MIN: CPT | Mod: 95 | Performed by: FAMILY MEDICINE

## 2020-05-12 NOTE — PROGRESS NOTES
"Marciano Akins is a 69 year old female who is being evaluated via a billable telephone visit.      The patient has been notified of following:     \"This telephone visit will be conducted via a call between you and your physician/provider. We have found that certain health care needs can be provided without the need for a physical exam.  This service lets us provide the care you need with a short phone conversation.  If a prescription is necessary we can send it directly to your pharmacy.  If lab work is needed we can place an order for that and you can then stop by our lab to have the test done at a later time.    Telephone visits are billed at different rates depending on your insurance coverage. During this emergency period, for some insurers they may be billed the same as an in-person visit.  Please reach out to your insurance provider with any questions.    If during the course of the call the physician/provider feels a telephone visit is not appropriate, you will not be charged for this service.\"    Patient has given verbal consent for Telephone visit?  Yes    What phone number would you like to be contacted at? [phone]    How would you like to obtain your AVS? Mail a copy    Subjective     Marciano Akins is a 69 year old female who presents to clinic today for the following health issues:    HPI  Hypertension Follow-up      Do you check your blood pressure regularly outside of the clinic? Yes     Are you following a low salt diet? Yes    Are your blood pressures ever more than 140 on the top number (systolic) OR more   than 90 on the bottom number (diastolic), for example 140/90? Yes  140's/70's average    How many servings of fruits and vegetables do you eat daily?  0-1    On average, how many sweetened beverages do you drink each day (Examples: soda, juice, sweet tea, etc.  Do NOT count diet or artificially sweetened beverages)?   0    How many days per week do you exercise enough to make your heart " beat faster? 3 or less    How many minutes a day do you exercise enough to make your heart beat faster? 9 or less    How many days per week do you miss taking your medication? 0            Patient Active Problem List   Diagnosis     Health Care Home     Hyperlipidemia with target LDL less than 100     Pulmonary nodules     Osteopenia     Thyroid nodule     Vitamin D deficiency     Benign essential hypertension     Anxiety     Counseling on health care directive     Pure hypercholesterolemia     History reviewed. No pertinent surgical history.    Social History     Tobacco Use     Smoking status: Never Smoker     Smokeless tobacco: Never Used   Substance Use Topics     Alcohol use: No     Alcohol/week: 0.0 standard drinks     History reviewed. No pertinent family history.        Reviewed and updated as needed this visit by Provider         Review of Systems   Constitutional, HEENT, cardiovascular, pulmonary, gi and gu systems are negative, except as otherwise noted.   The patient is complaining of drainage down the back of her throat and a runny nose.  Drainage is yellow in color.  She does not typically have allergies.  She is wondering if she does not have a sinus issue going on.  She denies any fevers, chills or sweats.  No facial pain.    Objective   Reported vitals:  There were no vitals taken for this visit.   healthy, alert and no distress  PSYCH: Alert and oriented times 3; coherent speech, normal   rate and volume, able to articulate logical thoughts, able   to abstract reason, no tangential thoughts, no hallucinations   or delusions  Her affect is normal  RESP: No cough, no audible wheezing, able to talk in full sentences  Remainder of exam unable to be completed due to telephone visits            Assessment/Plan:  1. Benign essential hypertension  We will continue with medications as noted.  Follow-up on her blood pressure will be in 3 months.    2. Acute rhinitis  I recommended getting either Flonase or  Nasacort to use on a daily basis.  She will follow-up in a couple of weeks if symptoms are still present.      No follow-ups on file.      Phone call duration:  6 minutes    Mayur Hector MD

## 2020-09-08 DIAGNOSIS — F41.0 PANIC ATTACK: ICD-10-CM

## 2020-09-08 DIAGNOSIS — I10 BENIGN ESSENTIAL HYPERTENSION: Chronic | ICD-10-CM

## 2020-09-08 RX ORDER — SERTRALINE HYDROCHLORIDE 100 MG/1
TABLET, FILM COATED ORAL
Qty: 90 TABLET | Refills: 1 | Status: SHIPPED | OUTPATIENT
Start: 2020-09-08 | End: 2021-03-30

## 2020-09-08 RX ORDER — LISINOPRIL 40 MG/1
TABLET ORAL
Qty: 90 TABLET | Refills: 1 | Status: SHIPPED | OUTPATIENT
Start: 2020-09-08 | End: 2021-03-30

## 2020-09-08 NOTE — TELEPHONE ENCOUNTER
Lisinopril  Routing refill request to provider for review/approval because:  BP out of range    Sertraline  Routing refill request to provider for review/approval because:  Drug interaction warning

## 2020-09-09 DIAGNOSIS — I10 BENIGN ESSENTIAL HYPERTENSION: Chronic | ICD-10-CM

## 2020-09-09 RX ORDER — AMLODIPINE BESYLATE 5 MG/1
7.5 TABLET ORAL DAILY
Qty: 135 TABLET | Refills: 0 | Status: SHIPPED | OUTPATIENT
Start: 2020-09-09 | End: 2020-12-16

## 2020-09-09 NOTE — TELEPHONE ENCOUNTER
Routing refill request to provider for review/approval because:  BP out of range:  Above 140/90   02/18/2020-     /86

## 2020-12-09 ENCOUNTER — VIRTUAL VISIT (OUTPATIENT)
Dept: INTERNAL MEDICINE | Facility: CLINIC | Age: 70
End: 2020-12-09
Payer: COMMERCIAL

## 2020-12-09 DIAGNOSIS — R05.9 COUGH: Primary | ICD-10-CM

## 2020-12-09 DIAGNOSIS — J34.89 SINUS PRESSURE: ICD-10-CM

## 2020-12-09 DIAGNOSIS — R05.9 COUGH: ICD-10-CM

## 2020-12-09 PROCEDURE — 99441 PR PHYSICIAN TELEPHONE EVALUATION 5-10 MIN: CPT | Mod: 95 | Performed by: FAMILY MEDICINE

## 2020-12-09 PROCEDURE — U0003 INFECTIOUS AGENT DETECTION BY NUCLEIC ACID (DNA OR RNA); SEVERE ACUTE RESPIRATORY SYNDROME CORONAVIRUS 2 (SARS-COV-2) (CORONAVIRUS DISEASE [COVID-19]), AMPLIFIED PROBE TECHNIQUE, MAKING USE OF HIGH THROUGHPUT TECHNOLOGIES AS DESCRIBED BY CMS-2020-01-R: HCPCS | Performed by: FAMILY MEDICINE

## 2020-12-09 RX ORDER — DOXYCYCLINE HYCLATE 100 MG
100 TABLET ORAL 2 TIMES DAILY
Qty: 28 TABLET | Refills: 0 | Status: SHIPPED | OUTPATIENT
Start: 2020-12-09 | End: 2020-12-23

## 2020-12-09 NOTE — LETTER
December 11, 2020      Marciano Akins  [address]  [place] [address]        Dear ,    We are writing to inform you of your test results.    Your nasal test for Covid-19 was negative.    Resulted Orders   Symptomatic COVID-19 Virus (Coronavirus) by PCR   Result Value Ref Range    COVID-19 Virus PCR to [facility] - Source Nasopharyngeal     COVID-19 Virus PCR to [facility] - Result Not Detected       Comment:      Collection of multiple specimens from the same patient may be necessary to   detect the virus. The possibility of a false negative should be considered if   the patient's recent exposure or clinical presentation suggests 2019 nCOV   infection and diagnostic tests for other causes of illness are negative.   Repeat testing may be considered in this setting.  Patient sample was heat inactivated and amplified using the HDPCR SARS-CoV-2   assay ([employer] The HDPCRTM SARS-CoV-2 assay is a reverse   transcription real-time polymerase chain reaction (qRT-PCR) test intended for   the qualitative detection of nucleic acid  from SARS-CoV-2 in human nasopharyngeal swabs, oropharyngeal swabs, anterior   nasal swabs, mid-turbinate nasal swabs as well as nasal aspirate, nasal wash,   and bronchoalveolar lavage (BAL) specimens from individuals who are suspected   of COVID-19 by their healthcare provider.  A negative result does not rule out the presence of real-time PCR inhibitors   in the sp ecimen or COVID-19 RNA in concentrations below the limit of detection   of the assay. The possibility of a false negative should be considered if the   patients recent exposure or clinical presentation suggests COVID-19.   Additional testing or repeat testing requires consultation with the   laboratory.  Nasopharyngeal specimen is the preferred choice for swab-based SARS CoV2   testing. When collection of a nasopharyngeal swab is not possible the   following are acceptable alternatives:  an  oropharyngeal (OP) specimen collected by a healthcare professional, or a   nasal mid-turbinate (NMT) swab collected by a healthcare professional or by   onsite self-collection (using a flocked tapered swab), or an anterior nares   specimen collected by a healthcare professional or by onsite self-collection   (using a round foam swab). (Centers for Disease Control)  Testing performed by [facility] ([facility]) [address] [place] [address]  The test performance characteristics were determined by [facility]. It has not been   cleared or approved by the FDA.  The laboratory is regulated under the Clinical Laboratory Improvement   Amendments of 1988 (CLIA-88) as qualified to perform high-complexity testing.   This test is used for clinical purposes. It should not be regarded as   investigational or for research.         If you have any questions or concerns, please call the clinic at the number listed above.       Sincerely,      Mayur Hector MD

## 2020-12-09 NOTE — PROGRESS NOTES
"Marciano Akins is a 70 year old female who is being evaluated via a billable video visit.      The patient has been notified of following:     \"This video visit will be conducted via a call between you and your physician/provider. We have found that certain health care needs can be provided without the need for an in-person physical exam.  This service lets us provide the care you need with a video conversation.  If a prescription is necessary we can send it directly to your pharmacy.  If lab work is needed we can place an order for that and you can then stop by our lab to have the test done at a later time.    Video visits are billed at different rates depending on your insurance coverage.  Please reach out to your insurance provider with any questions.    If during the course of the call the physician/provider feels a video visit is not appropriate, you will not be charged for this service.\"    Patient has given verbal consent for Video visit? Yes  How would you like to obtain your AVS? Mail a copy  If you are dropped from the video visit, the video invite should be resent to: Send to e-mail at: [email or web address]  Will anyone else be joining your video visit? No    Subjective     Marciano Akins is a 70 year old female who presents today via video visit for the following health issues:    HPI     Acute Illness  Acute illness concerns: cough  Onset/Duration: 2 weeks  Symptoms:  Fever: no  Chills/Sweats: no  Headache (location?): YES  Sinus Pressure: YES  Conjunctivitis:  no  Ear Pain: no  Rhinorrhea: YES  Congestion: YES  Sore Throat: YES  Cough: YES-productive of yellow sputum  Wheeze: YES  Decreased Appetite: YES  Nausea: YES  Vomiting: YES  Diarrhea: no  Dysuria/Freq.: no  Dysuria or Hematuria: no  Fatigue/Achiness: no  Sick/Strep Exposure: YES- was around granddaughter on thanksgiving and she had runny nose  Therapies tried and outcome: Mucinex for cough       Video Start Time: Video visit was not " "accomplished.  Visit was changed to a telephone visit.        Review of Systems   Constitutional, HEENT, cardiovascular, pulmonary, gi and gu systems are negative, except as otherwise noted.      Objective           Vitals:  No vitals were obtained today due to virtual visit.    Physical Exam     GENERAL: Healthy, alert and no distress  RESP: Patient was having coughing spells while on the telephone call.  PSYCH: Mentation appears normal, affect normal/bright, judgement and insight intact, normal speech and appearance well-groomed.              Assessment & Plan     Cough    - Symptomatic COVID-19 Virus (Coronavirus) by PCR    Sinus pressure    - doxycycline hyclate (VIBRA-TABS) 100 MG tablet  Dispense: 28 tablet; Refill: 0       BMI:   Estimated body mass index is 28.11 kg/m  as calculated from the following:    Height as of 2/18/20: 1.556 m (5' 1.25\").    Weight as of 2/18/20: 68 kg (150 lb).            There are no Patient Instructions on file for this visit.    No follow-ups on file.    Mayur Hector MD  [facility]      Video-Visit Details    Type of service:  Video Visit    Video End Time: See above.  Length of the telephone call was 6 minutes.    Originating Location (pt. Location): Home    Distant Location (provider location):  [facility]     Platform used for Video Visit: Carlito            "

## 2020-12-09 NOTE — PATIENT INSTRUCTIONS
I placed an order for the patient to get COVID-19 PCR testing done.  She will continue with Robitussin-DM for cough suppression.  I placed her on doxycycline 100 mg twice daily for 14 days.  Symptomatic treatment otherwise.  If not improving over the next 2 weeks she will contact us.

## 2020-12-10 LAB
SARS-COV-2 RNA SPEC QL NAA+PROBE: NOT DETECTED
SPECIMEN SOURCE: NORMAL

## 2020-12-11 ENCOUNTER — TELEPHONE (OUTPATIENT)
Dept: BEHAVIORAL HEALTH | Facility: CLINIC | Age: 70
End: 2020-12-11

## 2020-12-11 NOTE — TELEPHONE ENCOUNTER

## 2020-12-16 DIAGNOSIS — I10 BENIGN ESSENTIAL HYPERTENSION: Chronic | ICD-10-CM

## 2020-12-16 RX ORDER — AMLODIPINE BESYLATE 5 MG/1
7.5 TABLET ORAL DAILY
Qty: 135 TABLET | Refills: 0 | Status: SHIPPED | OUTPATIENT
Start: 2020-12-16 | End: 2021-04-21

## 2021-02-10 ENCOUNTER — OFFICE VISIT (OUTPATIENT)
Dept: INTERNAL MEDICINE | Facility: CLINIC | Age: 71
End: 2021-02-10
Payer: COMMERCIAL

## 2021-02-10 VITALS
TEMPERATURE: 98.5 F | WEIGHT: 158.8 LBS | SYSTOLIC BLOOD PRESSURE: 130 MMHG | DIASTOLIC BLOOD PRESSURE: 80 MMHG | OXYGEN SATURATION: 96 % | BODY MASS INDEX: 29.76 KG/M2 | HEART RATE: 71 BPM

## 2021-02-10 DIAGNOSIS — I10 BENIGN ESSENTIAL HYPERTENSION: Chronic | ICD-10-CM

## 2021-02-10 DIAGNOSIS — E55.9 VITAMIN D DEFICIENCY: ICD-10-CM

## 2021-02-10 DIAGNOSIS — J32.1 CHRONIC FRONTAL SINUSITIS: Primary | ICD-10-CM

## 2021-02-10 DIAGNOSIS — F41.9 ANXIETY: ICD-10-CM

## 2021-02-10 DIAGNOSIS — E78.5 HYPERLIPIDEMIA WITH TARGET LDL LESS THAN 100: ICD-10-CM

## 2021-02-10 LAB
ALBUMIN SERPL-MCNC: 3.6 G/DL (ref 3.4–5)
ALBUMIN UR-MCNC: NEGATIVE MG/DL
ALP SERPL-CCNC: 105 U/L (ref 40–150)
ALT SERPL W P-5'-P-CCNC: 21 U/L (ref 0–50)
ANION GAP SERPL CALCULATED.3IONS-SCNC: 5 MMOL/L (ref 3–14)
APPEARANCE UR: CLEAR
AST SERPL W P-5'-P-CCNC: 14 U/L (ref 0–45)
BASOPHILS # BLD AUTO: 0.1 10E9/L (ref 0–0.2)
BASOPHILS NFR BLD AUTO: 1 %
BILIRUB SERPL-MCNC: 0.4 MG/DL (ref 0.2–1.3)
BILIRUB UR QL STRIP: ABNORMAL
BUN SERPL-MCNC: 16 MG/DL (ref 7–30)
CALCIUM SERPL-MCNC: 9.4 MG/DL (ref 8.5–10.1)
CHLORIDE SERPL-SCNC: 108 MMOL/L (ref 94–109)
CHOLEST SERPL-MCNC: 292 MG/DL
CO2 SERPL-SCNC: 26 MMOL/L (ref 20–32)
COLOR UR AUTO: YELLOW
CREAT SERPL-MCNC: 0.72 MG/DL (ref 0.52–1.04)
DEPRECATED CALCIDIOL+CALCIFEROL SERPL-MC: 26 UG/L (ref 20–75)
DIFFERENTIAL METHOD BLD: NORMAL
EOSINOPHIL # BLD AUTO: 0.3 10E9/L (ref 0–0.7)
EOSINOPHIL NFR BLD AUTO: 4 %
ERYTHROCYTE [DISTWIDTH] IN BLOOD BY AUTOMATED COUNT: 13.9 % (ref 10–15)
GFR SERPL CREATININE-BSD FRML MDRD: 84 ML/MIN/{1.73_M2}
GLUCOSE SERPL-MCNC: 104 MG/DL (ref 70–99)
GLUCOSE UR STRIP-MCNC: NEGATIVE MG/DL
HCT VFR BLD AUTO: 44.8 % (ref 35–47)
HDLC SERPL-MCNC: 40 MG/DL
HGB BLD-MCNC: 14.4 G/DL (ref 11.7–15.7)
HGB UR QL STRIP: ABNORMAL
KETONES UR STRIP-MCNC: ABNORMAL MG/DL
LDLC SERPL CALC-MCNC: 207 MG/DL
LEUKOCYTE ESTERASE UR QL STRIP: NEGATIVE
LYMPHOCYTES # BLD AUTO: 2.6 10E9/L (ref 0.8–5.3)
LYMPHOCYTES NFR BLD AUTO: 38.1 %
MCH RBC QN AUTO: 28.5 PG (ref 26.5–33)
MCHC RBC AUTO-ENTMCNC: 32.1 G/DL (ref 31.5–36.5)
MCV RBC AUTO: 89 FL (ref 78–100)
MONOCYTES # BLD AUTO: 0.7 10E9/L (ref 0–1.3)
MONOCYTES NFR BLD AUTO: 10.1 %
NEUTROPHILS # BLD AUTO: 3.2 10E9/L (ref 1.6–8.3)
NEUTROPHILS NFR BLD AUTO: 46.8 %
NITRATE UR QL: NEGATIVE
NON-SQ EPI CELLS #/AREA URNS LPF: ABNORMAL /LPF
NONHDLC SERPL-MCNC: 252 MG/DL
PH UR STRIP: 5 PH (ref 5–7)
PLATELET # BLD AUTO: 260 10E9/L (ref 150–450)
POTASSIUM SERPL-SCNC: 4.3 MMOL/L (ref 3.4–5.3)
PROT SERPL-MCNC: 7.9 G/DL (ref 6.8–8.8)
RBC # BLD AUTO: 5.06 10E12/L (ref 3.8–5.2)
RBC #/AREA URNS AUTO: ABNORMAL /HPF
SODIUM SERPL-SCNC: 139 MMOL/L (ref 133–144)
SOURCE: ABNORMAL
SP GR UR STRIP: >1.03 (ref 1–1.03)
TRIGL SERPL-MCNC: 223 MG/DL
UROBILINOGEN UR STRIP-ACNC: 0.2 EU/DL (ref 0.2–1)
WBC # BLD AUTO: 6.8 10E9/L (ref 4–11)
WBC #/AREA URNS AUTO: ABNORMAL /HPF

## 2021-02-10 PROCEDURE — 85025 COMPLETE CBC W/AUTO DIFF WBC: CPT | Performed by: FAMILY MEDICINE

## 2021-02-10 PROCEDURE — 81001 URINALYSIS AUTO W/SCOPE: CPT | Performed by: FAMILY MEDICINE

## 2021-02-10 PROCEDURE — 36415 COLL VENOUS BLD VENIPUNCTURE: CPT | Performed by: FAMILY MEDICINE

## 2021-02-10 PROCEDURE — 80061 LIPID PANEL: CPT | Performed by: FAMILY MEDICINE

## 2021-02-10 PROCEDURE — 80053 COMPREHEN METABOLIC PANEL: CPT | Performed by: FAMILY MEDICINE

## 2021-02-10 PROCEDURE — 99214 OFFICE O/P EST MOD 30 MIN: CPT | Performed by: FAMILY MEDICINE

## 2021-02-10 PROCEDURE — 82306 VITAMIN D 25 HYDROXY: CPT | Performed by: FAMILY MEDICINE

## 2021-02-10 RX ORDER — DOXYCYCLINE HYCLATE 100 MG
100 TABLET ORAL 2 TIMES DAILY
Qty: 28 TABLET | Refills: 0 | Status: SHIPPED | OUTPATIENT
Start: 2021-02-10 | End: 2021-02-24

## 2021-02-10 NOTE — PROGRESS NOTES
"    Assessment & Plan     Chronic frontal sinusitis    - doxycycline hyclate (VIBRA-TABS) 100 MG tablet; Take 1 tablet (100 mg) by mouth 2 times daily for 14 days    Hyperlipidemia with target LDL less than 100    - Lipid Profile    Benign essential hypertension    - UA with Microscopic  - CBC with platelets differential  - Comprehensive metabolic panel    Anxiety      Vitamin D deficiency    - Vitamin D Deficiency      25 minutes spent on the date of the encounter doing chart review, history and exam, documentation and further activities as noted above       BMI:   Estimated body mass index is 29.76 kg/m  as calculated from the following:    Height as of 2/18/20: 1.556 m (5' 1.25\").    Weight as of this encounter: 72 kg (158 lb 12.8 oz).       Patient Instructions   We will draw labs as noted.  I placed the patient back on 2 weeks of doxycycline 100 mg twice daily.  Follow-up will be if she is not improving over the next 2 weeks.  Her cholesterol was very high the last time and when I sent her a note about getting on medication she basically did not pay any attention to that.      Return in about 2 weeks (around 2/24/2021) for If symptoms persist.    Mayur Hector MD  [facility] ESTELLA Tarqi is a 70 year old who presents for the following health issues  accompanied by her spouse:    HPI       Acute Illness  Acute illness concerns: runny nose and cough  Onset/Duration: 2 months  Symptoms:  Fever: no  Chills/Sweats: no  Headache (location?): no  Sinus Pressure: YES  Conjunctivitis:  no  Ear Pain: no  Rhinorrhea: YES  Congestion: YES  Sore Throat: no  Cough: YES-productive of yellow sputum  Wheeze: no  Decreased Appetite: no  Nausea: no  Vomiting: no  Diarrhea: YES  Dysuria/Freq.: no  Dysuria or Hematuria: no  Fatigue/Achiness: no  Sick/Strep Exposure: no  Therapies tried and outcome: nasal spray  Hyperlipidemia Follow-Up      Are you regularly taking any medication " or supplement to lower your cholesterol?   Yes- statin    Are you having muscle aches or other side effects that you think could be caused by your cholesterol lowering medication?  No    Hypertension Follow-up      Do you check your blood pressure regularly outside of the clinic? No     Are you following a low salt diet? Yes    Are your blood pressures ever more than 140 on the top number (systolic) OR more   than 90 on the bottom number (diastolic), for example 140/90? n/a      How many servings of fruits and vegetables do you eat daily?  2-3    On average, how many sweetened beverages do you drink each day (Examples: soda, juice, sweet tea, etc.  Do NOT count diet or artificially sweetened beverages)?   0    How many days per week do you exercise enough to make your heart beat faster? 3 or less    How many minutes a day do you exercise enough to make your heart beat faster? 10 - 19    How many days per week do you miss taking your medication? 0      Review of Systems   Constitutional, HEENT, cardiovascular, pulmonary, gi and gu systems are negative, except as otherwise noted.      Objective    /80 (BP Location: Right arm, Patient Position: Sitting, Cuff Size: Adult Regular)   Pulse 71   Temp 98.5  F (36.9  C) (Tympanic)   Wt 72 kg (158 lb 12.8 oz)   SpO2 96%   BMI 29.76 kg/m    Body mass index is 29.76 kg/m .  Physical Exam   GENERAL APPEARANCE: healthy, alert and no distress  HENT: ear canals and TM's normal, nose and mouth without ulcers or lesions and frontal sinus tenderness bilateral  NECK: no adenopathy and no asymmetry, masses, or scars  RESP: lungs clear to auscultation - no rales, rhonchi or wheezes  CV: regular rates and rhythm, normal S1 S2, no S3 or S4 and no murmur, click or rub

## 2021-02-10 NOTE — LETTER
February 12, 2021      Marciano Akins  [address]  [facility] [address]        Dear ,    We are writing to inform you of your test results.    The 10-year ASCVD risk score (Adela TELLO Jr., et al., 2013) is: 17.8%    Values used to calculate the score:      Age: 70 years      Sex: Female      Is Non- : No      Diabetic: No      Tobacco smoker: No      Systolic Blood Pressure: 130 mmHg      Is BP treated: Yes      HDL Cholesterol: 40 mg/dL      Total Cholesterol: 292 mg/dL     According to the American Heart Association you should be on medication for your cholesterol. Your 10 year risk of a heart attack or stroke is 17.8%. Let me know how you want to proceed.  The remainder of your tests are very stable.  Once we decide what to treat you with for your cholesterol we should plan on repeating those tests in about 6 weeks.    Resulted Orders   UA with Microscopic   Result Value Ref Range    Color Urine Yellow     Appearance Urine Clear     Glucose Urine Negative NEG^Negative mg/dL    Bilirubin Urine Small (A) NEG^Negative      Comment:      This is an unconfirmed screening test result. A positive result may be false.    Ketones Urine Trace (A) NEG^Negative mg/dL    Specific Gravity Urine >1.030 1.003 - 1.035    pH Urine 5.0 5.0 - 7.0 pH    Protein Albumin Urine Negative NEG^Negative mg/dL    Urobilinogen Urine 0.2 0.2 - 1.0 EU/dL    Nitrite Urine Negative NEG^Negative    Blood Urine Trace (A) NEG^Negative    Leukocyte Esterase Urine Negative NEG^Negative    Source Midstream Urine     WBC Urine 0 - 5 OTO5^0 - 5 /HPF    RBC Urine O - 2 OTO2^O - 2 /HPF    Squamous Epithelial /LPF Urine Few FEW^Few /LPF   CBC with platelets differential   Result Value Ref Range    WBC 6.8 4.0 - 11.0 10e9/L    RBC Count 5.06 3.8 - 5.2 10e12/L    Hemoglobin 14.4 11.7 - 15.7 g/dL    Hematocrit 44.8 35.0 - 47.0 %    MCV 89 78 - 100 fl    MCH 28.5 26.5 - 33.0 pg    MCHC 32.1 31.5 - 36.5 g/dL     RDW 13.9 10.0 - 15.0 %    Platelet Count 260 150 - 450 10e9/L    % Neutrophils 46.8 %    % Lymphocytes 38.1 %    % Monocytes 10.1 %    % Eosinophils 4.0 %    % Basophils 1.0 %    Absolute Neutrophil 3.2 1.6 - 8.3 10e9/L    Absolute Lymphocytes 2.6 0.8 - 5.3 10e9/L    Absolute Monocytes 0.7 0.0 - 1.3 10e9/L    Absolute Eosinophils 0.3 0.0 - 0.7 10e9/L    Absolute Basophils 0.1 0.0 - 0.2 10e9/L    Diff Method Automated Method    Comprehensive metabolic panel   Result Value Ref Range    Sodium 139 133 - 144 mmol/L    Potassium 4.3 3.4 - 5.3 mmol/L    Chloride 108 94 - 109 mmol/L    Carbon Dioxide 26 20 - 32 mmol/L    Anion Gap 5 3 - 14 mmol/L    Glucose 104 (H) 70 - 99 mg/dL      Comment:      Fasting specimen    Urea Nitrogen 16 7 - 30 mg/dL    Creatinine 0.72 0.52 - 1.04 mg/dL    GFR Estimate 84 >60 mL/min/[1.73_m2]      Comment:      Non  GFR Calc  Starting 12/18/2018, serum creatinine based estimated GFR (eGFR) will be   calculated using the Chronic Kidney Disease Epidemiology Collaboration   (CKD-EPI) equation.      GFR Estimate If Black >90 >60 mL/min/[1.73_m2]      Comment:       GFR Calc  Starting 12/18/2018, serum creatinine based estimated GFR (eGFR) will be   calculated using the Chronic Kidney Disease Epidemiology Collaboration   (CKD-EPI) equation.      Calcium 9.4 8.5 - 10.1 mg/dL    Bilirubin Total 0.4 0.2 - 1.3 mg/dL    Albumin 3.6 3.4 - 5.0 g/dL    Protein Total 7.9 6.8 - 8.8 g/dL    Alkaline Phosphatase 105 40 - 150 U/L    ALT 21 0 - 50 U/L    AST 14 0 - 45 U/L   Lipid Profile   Result Value Ref Range    Cholesterol 292 (H) <200 mg/dL      Comment:      Desirable:       <200 mg/dl    Triglycerides 223 (H) <150 mg/dL      Comment:      Borderline high:  150-199 mg/dl  High:             200-499 mg/dl  Very high:       >499 mg/dl  Fasting specimen      HDL Cholesterol 40 (L) >49 mg/dL    LDL Cholesterol Calculated 207 (H) <100 mg/dL      Comment:      Above desirable:   100-129 mg/dl  Borderline High:  130-159 mg/dL  High:             160-189 mg/dL  Very high:       >189 mg/dl      Non HDL Cholesterol 252 (H) <130 mg/dL      Comment:      Above Desirable:  130-159 mg/dl  Borderline high:  160-189 mg/dl  High:             190-219 mg/dl  Very high:       >219 mg/dl     Vitamin D Deficiency   Result Value Ref Range    Vitamin D Deficiency screening 26 20 - 75 ug/L      Comment:      Season, race, dietary intake, and treatment affect the concentration of   25-hydroxy-Vitamin D. Values may decrease during winter months and increase   during summer months. Values 20-29 ug/L may indicate Vitamin D insufficiency   and values <20 ug/L may indicate Vitamin D deficiency.  Vitamin D determination is routinely performed by an immunoassay specific for   25 hydroxyvitamin D3.  If an individual is on vitamin D2 (ergocalciferol)   supplementation, please specify 25 OH vitamin D2 and D3 level determination by   LCMSMS test [id number].         If you have any questions or concerns, please call the clinic at the number listed above.       Sincerely,      Mayur Hector MD

## 2021-02-10 NOTE — PATIENT INSTRUCTIONS
We will draw labs as noted.  I placed the patient back on 2 weeks of doxycycline 100 mg twice daily.  Follow-up will be if she is not improving over the next 2 weeks.  Her cholesterol was very high the last time and when I sent her a note about getting on medication she basically did not pay any attention to that.

## 2021-02-19 ENCOUNTER — TELEPHONE (OUTPATIENT)
Dept: INTERNAL MEDICINE | Facility: CLINIC | Age: 71
End: 2021-02-19

## 2021-02-19 DIAGNOSIS — E78.5 HYPERLIPIDEMIA WITH TARGET LDL LESS THAN 100: Primary | ICD-10-CM

## 2021-02-19 RX ORDER — ATORVASTATIN CALCIUM 40 MG/1
40 TABLET, FILM COATED ORAL DAILY
Qty: 30 TABLET | Refills: 2 | Status: SHIPPED | OUTPATIENT
Start: 2021-02-19 | End: 2021-03-15

## 2021-02-19 NOTE — TELEPHONE ENCOUNTER
"Pt calling about letter she received re: cholesterol labs.    \"According to the American Heart Association you should be on medication for your cholesterol. Your 10 year risk of a heart attack or stroke is 17.8%. Let me know how you want to proceed.  The remainder of your tests are very stable.  Once we decide what to treat you with for your cholesterol we should plan on repeating those tests in about 6 weeks.\"    She would like to get started on cholesterol medication.  Pharmacy pending.  Please call her when RX is ordered.   And also order f/u labs for when she comes in for lipid recheck in 6 weeks.  "

## 2021-03-04 ENCOUNTER — IMMUNIZATION (OUTPATIENT)
Dept: NURSING | Facility: CLINIC | Age: 71
End: 2021-03-04
Payer: COMMERCIAL

## 2021-03-04 PROCEDURE — 0001A PR COVID VAC PFIZER DIL RECON 30 MCG/0.3 ML IM: CPT

## 2021-03-04 PROCEDURE — 91300 PR COVID VAC PFIZER DIL RECON 30 MCG/0.3 ML IM: CPT

## 2021-03-15 DIAGNOSIS — E78.5 HYPERLIPIDEMIA WITH TARGET LDL LESS THAN 100: ICD-10-CM

## 2021-03-15 RX ORDER — ATORVASTATIN CALCIUM 40 MG/1
TABLET, FILM COATED ORAL
Qty: 90 TABLET | Refills: 0 | Status: SHIPPED | OUTPATIENT
Start: 2021-03-15 | End: 2021-04-21

## 2021-03-15 NOTE — LETTER
[facility]  [address] [id number] [phone]            Marciano Akins  [address]  [facility] [address]        March 16, 2021    Dear Marciano,    While refilling your prescription today, we noticed that you are due to have labs drawn in April, fasting lab.  We will refill your prescription for 90 days, but a follow-up appointment must be made before any additional refills can be approved.     Taking care of your health is important to us and we look forward to seeing you in the near future.  Please call us at [phone] or [phone] (or use [email or web address]) to schedule an appointment.     Please disregard this notice if you have already made an appointment.    Sincerely,        [facility]

## 2021-03-25 ENCOUNTER — IMMUNIZATION (OUTPATIENT)
Dept: NURSING | Facility: CLINIC | Age: 71
End: 2021-03-25
Attending: INTERNAL MEDICINE
Payer: COMMERCIAL

## 2021-03-25 PROCEDURE — 91300 PR COVID VAC PFIZER DIL RECON 30 MCG/0.3 ML IM: CPT

## 2021-03-25 PROCEDURE — 0002A PR COVID VAC PFIZER DIL RECON 30 MCG/0.3 ML IM: CPT

## 2021-03-29 DIAGNOSIS — I10 BENIGN ESSENTIAL HYPERTENSION: Chronic | ICD-10-CM

## 2021-03-29 DIAGNOSIS — F41.0 PANIC ATTACK: ICD-10-CM

## 2021-03-30 RX ORDER — LISINOPRIL 40 MG/1
TABLET ORAL
Qty: 90 TABLET | Refills: 1 | Status: SHIPPED | OUTPATIENT
Start: 2021-03-30 | End: 2021-12-17

## 2021-03-30 RX ORDER — SERTRALINE HYDROCHLORIDE 100 MG/1
TABLET, FILM COATED ORAL
Qty: 90 TABLET | Refills: 1 | Status: SHIPPED | OUTPATIENT
Start: 2021-03-30 | End: 2021-12-17

## 2021-03-30 NOTE — TELEPHONE ENCOUNTER
Prescription approved per [facility] Refill Protocol.    aNdya Du, MSN, RN  Triage RN  [facility]

## 2021-10-10 DIAGNOSIS — I10 BENIGN ESSENTIAL HYPERTENSION: Chronic | ICD-10-CM

## 2021-10-10 DIAGNOSIS — F41.0 PANIC ATTACK: ICD-10-CM

## 2021-10-10 NOTE — LETTER
[facility]  [address] [address]  [phone]      10/15/2021       Marciano Akins  [address]  [facility] [address]        Dear Marciano,  Your prescription has been reviewed. However, due to the fact that your Provider, Mayur Hector, is no longer at our establishment; You will need to establish care with another Provider.     Please call the number listed above or log into Fly[employer] to scheduled an appointment to establish care with another Provider. Your prescription for SERTRALINE  MG TABLET and LISINOPRIL 40 MG TABLET will not be refilled until you have established with a new Provider.     Sincerely,    Your Healthcare Team  Internal Medicine

## 2021-10-11 RX ORDER — LISINOPRIL 40 MG/1
TABLET ORAL
Qty: 90 TABLET | Refills: 1 | OUTPATIENT
Start: 2021-10-11

## 2021-10-11 RX ORDER — SERTRALINE HYDROCHLORIDE 100 MG/1
TABLET, FILM COATED ORAL
Qty: 90 TABLET | Refills: 1 | OUTPATIENT
Start: 2021-10-11

## 2021-10-11 NOTE — TELEPHONE ENCOUNTER
Routing refill request to provider for review/approval because:  Needs new PCP- last office visit 2/21.     Also routing to TC to reach out and schedule patient.    Nydia Lyle, RN  [facility]

## 2021-12-13 ENCOUNTER — NURSE TRIAGE (OUTPATIENT)
Dept: NURSING | Facility: CLINIC | Age: 71
End: 2021-12-13
Payer: COMMERCIAL

## 2021-12-13 NOTE — TELEPHONE ENCOUNTER
Marciano has a cough that started on Saturday December 10th.  Slight fever.  Cough is making Marciano vomit.  Denies shortness of breath.  Patient and her  got stranded in her car for about an hour in the cold.  Patient also has a runny nose.  Patient denies severe chest pain and denies severe shortness of breath.  Patient does not want a virtual visit, patient wants to be seen in person.   Patient states that she will go to urgent care.    COVID 19 Nurse Triage Plan/Patient Instructions    Please be aware that novel coronavirus (COVID-19) may be circulating in the community. If you develop symptoms such as fever, cough, or SOB or if you have concerns about the presence of another infection including coronavirus (COVID-19), please contact your health care provider or visit [email or web address].     Disposition/Instructions    Virtual Visit with provider recommended. Reference Visit Selection Guide.    Thank you for taking steps to prevent the spread of this virus.  o Limit your contact with others.  o Wear a simple mask to cover your cough.  o Wash your hands well and often.    Resources    [facility]: About COVID-19: [email or web address]    CDC: What to Do If You're Sick: www.cdc.gov/coronavirus/2019-ncov/about/steps-when-sick.html    CDC: Ending Home Isolation: www.cdc.gov/coronavirus/2019-ncov/hcp/disposition-in-home-patients.html     CDC: Caring for Someone: www.cdc.gov/coronavirus/2019-ncov/if-you-are-sick/care-for-someone.html     [facility]: Interim Guidance for Hospital Discharge to Home: www.health.[facility].mn.us/diseases/coronavirus/hcp/hospdischarge.pdf    [facility] clinical trials (COVID-19 research studies): clinicalaffairs.[facility].edu/um-clinical-trials     Below are the COVID-19 hotlines at the [place] Department of Health ([facility]). Interpreters are available.   o For health questions: Call 251-010-5204 or 1-135.141.2335 (7 a.m. to 7 p.m.)  o For questions about schools  and childcare: Call 182-646-4629 or 1-347.328.2237 (7 a.m. to 7 p.m.)                       Reason for Disposition    Continuous (nonstop) coughing interferes with work or school and no improvement using cough treatment per Care Advice    Additional Information    Negative: Bluish (or gray) lips or face    Negative: Severe difficulty breathing (e.g., struggling for each breath, speaks in single words)    Negative: Rapid onset of cough and has hives    Negative: Coughing started suddenly after medicine, an allergic food or bee sting    Negative: Difficulty breathing after exposure to flames, smoke, or fumes    Negative: Sounds like a life-threatening emergency to the triager    Negative: Chest pain present when not coughing    Negative: Difficulty breathing    Negative: Passed out (i.e., fainted, collapsed and was not responding)    Negative: Patient sounds very sick or weak to the triager    Negative: Coughed up > 1 tablespoon (15 ml) blood (Exception: blood-tinged sputum)    Negative: Fever > 103 F (39.4 C)    Negative: Fever > 101 F (38.3 C) and over 60 years of age    Negative: Fever > 100.0 F (37.8 C) and has diabetes mellitus or a weak immune system (e.g., HIV positive, cancer chemotherapy, organ transplant, splenectomy, chronic steroids)    Negative: Fever > 100.0 F (37.8 C) and bedridden (e.g., nursing home patient, stroke, chronic illness, recovering from surgery)    Negative: Increasing ankle swelling    Negative: Wheezing is present    Negative: SEVERE coughing spells (e.g., whooping sound after coughing, vomiting after coughing)    Negative: Coughing up gary-colored (reddish-brown) or blood-tinged sputum    Negative: Fever present > 3 days (72 hours)    Negative: Fever returns after gone for over 24 hours and symptoms worse or not improved    Negative: Using nasal washes and pain medicine > 24 hours and sinus pain persists    Negative: Known COPD or other severe lung disease (i.e., bronchiectasis, cystic  fibrosis, lung surgery) and worsening symptoms (i.e., increased sputum purulence or amount, increased breathing difficulty)    Protocols used: COUGH-A-OH

## 2021-12-17 ENCOUNTER — OFFICE VISIT (OUTPATIENT)
Dept: INTERNAL MEDICINE | Facility: CLINIC | Age: 71
End: 2021-12-17
Payer: COMMERCIAL

## 2021-12-17 ENCOUNTER — ANCILLARY PROCEDURE (OUTPATIENT)
Dept: GENERAL RADIOLOGY | Facility: CLINIC | Age: 71
End: 2021-12-17
Attending: NURSE PRACTITIONER
Payer: COMMERCIAL

## 2021-12-17 VITALS
OXYGEN SATURATION: 97 % | BODY MASS INDEX: 29.24 KG/M2 | SYSTOLIC BLOOD PRESSURE: 178 MMHG | HEART RATE: 73 BPM | TEMPERATURE: 97.3 F | DIASTOLIC BLOOD PRESSURE: 84 MMHG | WEIGHT: 156 LBS

## 2021-12-17 DIAGNOSIS — R06.2 WHEEZING: ICD-10-CM

## 2021-12-17 DIAGNOSIS — E78.5 HYPERLIPIDEMIA WITH TARGET LDL LESS THAN 100: ICD-10-CM

## 2021-12-17 DIAGNOSIS — E55.9 VITAMIN D DEFICIENCY: ICD-10-CM

## 2021-12-17 DIAGNOSIS — I10 BENIGN ESSENTIAL HYPERTENSION: Chronic | ICD-10-CM

## 2021-12-17 DIAGNOSIS — R05.9 COUGH: Primary | ICD-10-CM

## 2021-12-17 DIAGNOSIS — R05.9 COUGH: ICD-10-CM

## 2021-12-17 DIAGNOSIS — F41.0 PANIC ATTACK: ICD-10-CM

## 2021-12-17 LAB
FLUAV AG SPEC QL IA: NEGATIVE
FLUBV AG SPEC QL IA: NEGATIVE

## 2021-12-17 PROCEDURE — 87804 INFLUENZA ASSAY W/OPTIC: CPT | Performed by: NURSE PRACTITIONER

## 2021-12-17 PROCEDURE — U0003 INFECTIOUS AGENT DETECTION BY NUCLEIC ACID (DNA OR RNA); SEVERE ACUTE RESPIRATORY SYNDROME CORONAVIRUS 2 (SARS-COV-2) (CORONAVIRUS DISEASE [COVID-19]), AMPLIFIED PROBE TECHNIQUE, MAKING USE OF HIGH THROUGHPUT TECHNOLOGIES AS DESCRIBED BY CMS-2020-01-R: HCPCS | Performed by: NURSE PRACTITIONER

## 2021-12-17 PROCEDURE — 99214 OFFICE O/P EST MOD 30 MIN: CPT | Performed by: NURSE PRACTITIONER

## 2021-12-17 PROCEDURE — U0005 INFEC AGEN DETEC AMPLI PROBE: HCPCS | Performed by: NURSE PRACTITIONER

## 2021-12-17 PROCEDURE — 71046 X-RAY EXAM CHEST 2 VIEWS: CPT | Performed by: RADIOLOGY

## 2021-12-17 RX ORDER — LISINOPRIL 40 MG/1
40 TABLET ORAL DAILY
Qty: 90 TABLET | Refills: 1 | Status: SHIPPED | OUTPATIENT
Start: 2021-12-17 | End: 2022-04-22

## 2021-12-17 RX ORDER — BENZONATATE 100 MG/1
100 CAPSULE ORAL 3 TIMES DAILY PRN
Qty: 15 CAPSULE | Refills: 0 | Status: SHIPPED | OUTPATIENT
Start: 2021-12-17 | End: 2023-04-24

## 2021-12-17 RX ORDER — DOXYCYCLINE HYCLATE 100 MG
100 TABLET ORAL 2 TIMES DAILY
Qty: 14 TABLET | Refills: 0 | Status: SHIPPED | OUTPATIENT
Start: 2021-12-17 | End: 2021-12-24

## 2021-12-17 RX ORDER — PREDNISONE 20 MG/1
40 TABLET ORAL DAILY
Qty: 10 TABLET | Refills: 0 | Status: SHIPPED | OUTPATIENT
Start: 2021-12-17 | End: 2021-12-22

## 2021-12-17 RX ORDER — AMLODIPINE BESYLATE 5 MG/1
7.5 TABLET ORAL DAILY
Qty: 135 TABLET | Refills: 3 | Status: SHIPPED | OUTPATIENT
Start: 2021-12-17 | End: 2022-04-22

## 2021-12-17 RX ORDER — GUAIFENESIN/DEXTROMETHORPHAN 100-10MG/5
10 SYRUP ORAL EVERY 4 HOURS PRN
Qty: 118 ML | Refills: 0 | Status: SHIPPED | OUTPATIENT
Start: 2021-12-17 | End: 2023-04-24

## 2021-12-17 RX ORDER — ALBUTEROL SULFATE 90 UG/1
2 AEROSOL, METERED RESPIRATORY (INHALATION) EVERY 6 HOURS
Qty: 18 G | Refills: 0 | Status: SHIPPED | OUTPATIENT
Start: 2021-12-17 | End: 2023-04-24

## 2021-12-17 RX ORDER — ATORVASTATIN CALCIUM 40 MG/1
40 TABLET, FILM COATED ORAL DAILY
Qty: 90 TABLET | Refills: 3 | Status: SHIPPED | OUTPATIENT
Start: 2021-12-17 | End: 2022-04-22

## 2021-12-17 RX ORDER — SERTRALINE HYDROCHLORIDE 100 MG/1
100 TABLET, FILM COATED ORAL DAILY
Qty: 90 TABLET | Refills: 1 | Status: SHIPPED | OUTPATIENT
Start: 2021-12-17 | End: 2022-04-22

## 2021-12-17 RX ORDER — CHOLECALCIFEROL (VITAMIN D3) 50 MCG
50 TABLET ORAL DAILY
Qty: 100 TABLET | Refills: 3 | Status: SHIPPED | OUTPATIENT
Start: 2021-12-17 | End: 2022-04-22

## 2021-12-17 NOTE — PROGRESS NOTES
Assessment & Plan     Cough  -Patient with a 7-day history of cough, congestion, headache, fatigue/malaise, shortness of breath with exertion.  Differentials include COVID-19, influenza, pneumonia, bronchitis, acute viral illness.  She had a negative Covid test on 12/15/2021 which was a saliva test.  - We will obtain influenza and COVID-19 testing today.  Patient is fully vaccinated and denies any recent exposures, states she and her  stay in their home most of the time.  - We will obtain a chest x-ray to rule out pneumonia  - Patient is at risk for worsening illness, and has had increased symptoms over the past week without improvement, she appears acutely ill.  Will treat with doxycycline 100 mg twice daily, as well as prednisone 40 mg daily x5 days.  An albuterol inhaler was prescribed as well, patient was counseled on use.  Will also provide a prescription for Tessalon Perles and Robitussin-DM for cough suppressant.  - Discussed home isolation, supportive cares including fluids and rest.  Return precautions reviewed.  - Symptomatic; Yes; 12/10/2021 COVID-19 Virus (Coronavirus) by PCR Nasopharyngeal  - XR Chest 2 Views  - doxycycline hyclate (VIBRA-TABS) 100 MG tablet  Dispense: 14 tablet; Refill: 0  - predniSONE (DELTASONE) 20 MG tablet  Dispense: 10 tablet; Refill: 0  - benzonatate (TESSALON) 100 MG capsule  Dispense: 15 capsule; Refill: 0  - albuterol (PROAIR HFA/PROVENTIL HFA/VENTOLIN HFA) 108 (90 Base) MCG/ACT inhaler  Dispense: 18 g; Refill: 0  - Influenza A & B Antigen - Clinic Collect  - guaiFENesin-dextromethorphan (ROBITUSSIN DM) 100-10 MG/5ML syrup  Dispense: 118 mL; Refill: 0  - Symptomatic; Yes; 12/10/2021 COVID-19 Virus (Coronavirus) by PCR Nose    Wheezing  - See above  - Symptomatic; Yes; 12/10/2021 COVID-19 Virus (Coronavirus) by PCR Nasopharyngeal  - XR Chest 2 Views  - predniSONE (DELTASONE) 20 MG tablet  Dispense: 10 tablet; Refill: 0  - albuterol (PROAIR HFA/PROVENTIL HFA/VENTOLIN  HFA) 108 (90 Base) MCG/ACT inhaler  Dispense: 18 g; Refill: 0  - guaiFENesin-dextromethorphan (ROBITUSSIN DM) 100-10 MG/5ML syrup  Dispense: 118 mL; Refill: 0  - Symptomatic; Yes; 12/10/2021 COVID-19 Virus (Coronavirus) by PCR Nose    Benign essential hypertension  - Blood pressure is above goal.  Patient has not had her medications in 3 weeks as she ran out.  Patient had labs done in February, 2021 which showed a normal BMP.  - Refilled amlodipine and lisinopril.  Patient will return in February for health maintenance exam  - amLODIPine (NORVASC) 5 MG tablet  Dispense: 135 tablet; Refill: 3  - lisinopril (ZESTRIL) 40 MG tablet  Dispense: 90 tablet; Refill: 1    Hyperlipidemia with target LDL less than 100  - Refilled atorvastatin 40 mg daily.  Of note cholesterol quite high back in February, 2021.  Patient will follow up in February for health maintenance exam  - atorvastatin (LIPITOR) 40 MG tablet  Dispense: 90 tablet; Refill: 3    Vitamin D deficiency  - Refilled medication  - vitamin D3 (CHOLECALCIFEROL) 50 mcg (2000 units) tablet  Dispense: 100 tablet; Refill: 3    Panic attack  - Refilled medication, patient was advised to take Zoloft 50 mg daily for 1 week and then increase to 100 mg daily thereafter as she has been out for 3 weeks.  - sertraline (ZOLOFT) 100 MG tablet  Dispense: 90 tablet; Refill: 1    [id number]}     See Patient Instructions    Return if symptoms worsen or fail to improve.    YUE Cheatham CNP  [facility]    Rossy Tariq is a 71 year old who presents for the following health issues  accompanied by her spouse.    Chief Complaint   Patient presents with     Cough     see triage note      Patient is a pleasant 71-year-old patient with a past medical history significant for hypertension, hyperlipidemia, anxiety, who presents today for feeling unwell for the past 7 days.    Patient reports that she has had a cough for the past 7 days, in addition  to a sore throat, headache, intermittent nausea and vomiting, low energy.  She reports that her chest feels full.  She is expectorating white sputum.  She denies shortness of breath at rest however notes that she gets very tired if she walks for any distance.  She endorses fevers and chills.  She reports that she went to [employer] and had a Covid test on December 15 which came back as negative, however, of note, this was a saliva test.  She is fully vaccinated for Covid.  She denies any ear pain or nasal congestion.  She does report some intermittent nausea and vomiting, low appetite.  Denies diarrhea.    Of note, patient's blood pressure is high.  She reports that she has not had her medications for 3 weeks and has been trying to get refills.  She is normally maintained on amlodipine 7.5 mg daily, and lisinopril 40 mg daily.    Review of Systems   CONSTITUTIONAL: Fevers, chills, fatigue  INTEGUMENTARY/SKIN: NEGATIVE for worrisome rashes, moles or lesions  EYES: NEGATIVE for vision changes or irritation  ENT/MOUTH: Positive for sore throat, loss of taste and smell.  No nasal congestion or sinus pain.  RESP: Positive for cough, congestion, sputum, shortness of breath with exertion  CV: NEGATIVE for chest pain, palpitations or peripheral edema  GI: Intermittent nausea and vomiting, abdominal bloating.  No diarrhea.  Does complain of rectal itching.  MUSCULOSKELETAL: NEGATIVE for significant arthralgias or myalgia      Objective    BP (!) 178/84   Pulse 73   Temp 97.3  F (36.3  C) (Tympanic)   Wt 70.8 kg (156 lb)   SpO2 97%   BMI 29.24 kg/m    Body mass index is 29.24 kg/m .     Physical Exam   GENERAL APPEARANCE: alert, no distress and appears mildly ill  EYES: Eyes grossly normal to inspection, PERRL and conjunctivae and sclerae normal  HENT: ear canals and TM's normal and nose and mouth without ulcers or lesions, posterior oropharyngeal space is without erythema or exudates or swelling  NECK: no adenopathy  and no asymmetry, masses, or scars  RESP: Bilateral wheezing with reduced breath sounds.  Patient has a very frequent harsh congested sounding cough  CV: regular rates and rhythm, normal S1 S2, no S3 or S4 and no murmur, click or rub  ABDOMEN: soft, nontender, without hepatosplenomegaly or masses and bowel sounds normal  MS: extremities normal- no gross deformities noted  SKIN: no suspicious lesions or rashes  NEURO: Normal strength and tone, mentation intact and speech normal  PSYCH: mentation appears normal and affect normal/bright

## 2021-12-17 NOTE — PATIENT INSTRUCTIONS
-Get a chest xray on your way out- I will call you with results  -COVIC and influenza test today- you will be called with results  -Antibiotic, doxycycline 1 pill twice daily for 1 week  -Prednisone (steroid to reduce inflammation and wheezing) take 2 pills daily for 5 days- take with food  -Tessalon for cough  -Robitussin DM 1 teaspoon every 4 hours as needed for cough  -Isolate at home  -Drink plenty of fluids.    -MEDICATIONS REFILLED  -For your Zoloft, only take 1/2 tablet daily for 1 week, then increase to 1 full tablet daily    Follow up in February for health maintenance

## 2021-12-18 LAB — SARS-COV-2 RNA RESP QL NAA+PROBE: NEGATIVE

## 2022-01-01 ENCOUNTER — HEALTH MAINTENANCE LETTER (OUTPATIENT)
Age: 72
End: 2022-01-01

## 2022-02-26 ENCOUNTER — HEALTH MAINTENANCE LETTER (OUTPATIENT)
Age: 72
End: 2022-02-26

## 2022-03-23 ENCOUNTER — TELEPHONE (OUTPATIENT)
Dept: INTERNAL MEDICINE | Facility: CLINIC | Age: 72
End: 2022-03-23
Payer: COMMERCIAL

## 2022-03-23 NOTE — TELEPHONE ENCOUNTER
Patient Quality Outreach    Patient is due for the following:   Hypertension -  BP check at next visit. This was addressed at 12-17-21 visit-Pt showed high reading as she had been out of her medications for 3 weeks per OV note  Colon Cancer Screening -  FIT, Colonoscopy and Cologuard type per provider's order  Breast Cancer Screening - Mammogram  Physical  - Medicare Wellness   Immunizations  -  Influenza and Zoster    NEXT STEPS:   Schedule a yearly physical    Type of outreach:    Sent [employer] message.    Next Steps:  Reach out within 90 days via Phone.    Max number of attempts reached: No. Will try again in 90 days if patient still on fail list.    Questions for provider review:    None     Pamela Quan CMA  Chart routed to self.

## 2022-04-22 ENCOUNTER — OFFICE VISIT (OUTPATIENT)
Dept: INTERNAL MEDICINE | Facility: CLINIC | Age: 72
End: 2022-04-22
Payer: COMMERCIAL

## 2022-04-22 VITALS
TEMPERATURE: 97.5 F | DIASTOLIC BLOOD PRESSURE: 74 MMHG | HEIGHT: 61 IN | SYSTOLIC BLOOD PRESSURE: 134 MMHG | HEART RATE: 72 BPM | OXYGEN SATURATION: 97 % | WEIGHT: 155.6 LBS | BODY MASS INDEX: 29.38 KG/M2

## 2022-04-22 DIAGNOSIS — F32.5 MAJOR DEPRESSIVE DISORDER WITH SINGLE EPISODE, IN FULL REMISSION (H): ICD-10-CM

## 2022-04-22 DIAGNOSIS — F41.0 PANIC ATTACK: ICD-10-CM

## 2022-04-22 DIAGNOSIS — Z00.00 ENCOUNTER FOR MEDICARE ANNUAL WELLNESS EXAM: Primary | ICD-10-CM

## 2022-04-22 DIAGNOSIS — E78.5 HYPERLIPIDEMIA WITH TARGET LDL LESS THAN 100: ICD-10-CM

## 2022-04-22 DIAGNOSIS — F41.9 ANXIETY: ICD-10-CM

## 2022-04-22 DIAGNOSIS — E55.9 VITAMIN D DEFICIENCY: ICD-10-CM

## 2022-04-22 DIAGNOSIS — M85.80 OSTEOPENIA, UNSPECIFIED LOCATION: ICD-10-CM

## 2022-04-22 DIAGNOSIS — Z12.11 SCREEN FOR COLON CANCER: ICD-10-CM

## 2022-04-22 DIAGNOSIS — Z78.0 ASYMPTOMATIC MENOPAUSAL STATE: ICD-10-CM

## 2022-04-22 DIAGNOSIS — Z12.31 VISIT FOR SCREENING MAMMOGRAM: ICD-10-CM

## 2022-04-22 DIAGNOSIS — I10 BENIGN ESSENTIAL HYPERTENSION: ICD-10-CM

## 2022-04-22 LAB
ALBUMIN SERPL-MCNC: 4 G/DL (ref 3.4–5)
ALP SERPL-CCNC: 125 U/L (ref 40–150)
ALT SERPL W P-5'-P-CCNC: 25 U/L (ref 0–50)
ANION GAP SERPL CALCULATED.3IONS-SCNC: 4 MMOL/L (ref 3–14)
AST SERPL W P-5'-P-CCNC: 17 U/L (ref 0–45)
BILIRUB SERPL-MCNC: 0.5 MG/DL (ref 0.2–1.3)
BUN SERPL-MCNC: 13 MG/DL (ref 7–30)
CALCIUM SERPL-MCNC: 8.9 MG/DL (ref 8.5–10.1)
CHLORIDE BLD-SCNC: 105 MMOL/L (ref 94–109)
CHOLEST SERPL-MCNC: 123 MG/DL
CO2 SERPL-SCNC: 29 MMOL/L (ref 20–32)
CREAT SERPL-MCNC: 0.82 MG/DL (ref 0.52–1.04)
DEPRECATED CALCIDIOL+CALCIFEROL SERPL-MC: 29 UG/L (ref 20–75)
ERYTHROCYTE [DISTWIDTH] IN BLOOD BY AUTOMATED COUNT: 13.6 % (ref 10–15)
FASTING STATUS PATIENT QL REPORTED: YES
FASTING STATUS PATIENT QL REPORTED: YES
GFR SERPL CREATININE-BSD FRML MDRD: 75 ML/MIN/1.73M2
GLUCOSE BLD-MCNC: 104 MG/DL (ref 70–99)
GLUCOSE BLD-MCNC: 104 MG/DL (ref 70–99)
HCT VFR BLD AUTO: 46.5 % (ref 35–47)
HDLC SERPL-MCNC: 36 MG/DL
HGB BLD-MCNC: 14.9 G/DL (ref 11.7–15.7)
LDLC SERPL CALC-MCNC: 68 MG/DL
MCH RBC QN AUTO: 28.4 PG (ref 26.5–33)
MCHC RBC AUTO-ENTMCNC: 32 G/DL (ref 31.5–36.5)
MCV RBC AUTO: 89 FL (ref 78–100)
NONHDLC SERPL-MCNC: 87 MG/DL
PLATELET # BLD AUTO: 252 10E3/UL (ref 150–450)
POTASSIUM BLD-SCNC: 3.9 MMOL/L (ref 3.4–5.3)
PROT SERPL-MCNC: 8.1 G/DL (ref 6.8–8.8)
RBC # BLD AUTO: 5.25 10E6/UL (ref 3.8–5.2)
SODIUM SERPL-SCNC: 138 MMOL/L (ref 133–144)
TRIGL SERPL-MCNC: 93 MG/DL
WBC # BLD AUTO: 5.5 10E3/UL (ref 4–11)

## 2022-04-22 PROCEDURE — 36415 COLL VENOUS BLD VENIPUNCTURE: CPT | Performed by: NURSE PRACTITIONER

## 2022-04-22 PROCEDURE — 99214 OFFICE O/P EST MOD 30 MIN: CPT | Mod: 25 | Performed by: NURSE PRACTITIONER

## 2022-04-22 PROCEDURE — 80053 COMPREHEN METABOLIC PANEL: CPT | Performed by: NURSE PRACTITIONER

## 2022-04-22 PROCEDURE — 85027 COMPLETE CBC AUTOMATED: CPT | Performed by: NURSE PRACTITIONER

## 2022-04-22 PROCEDURE — 80061 LIPID PANEL: CPT | Performed by: NURSE PRACTITIONER

## 2022-04-22 PROCEDURE — 99397 PER PM REEVAL EST PAT 65+ YR: CPT | Performed by: NURSE PRACTITIONER

## 2022-04-22 PROCEDURE — 82306 VITAMIN D 25 HYDROXY: CPT | Performed by: NURSE PRACTITIONER

## 2022-04-22 RX ORDER — AMLODIPINE BESYLATE 5 MG/1
7.5 TABLET ORAL DAILY
Qty: 135 TABLET | Refills: 3 | Status: SHIPPED | OUTPATIENT
Start: 2022-04-22 | End: 2023-04-24

## 2022-04-22 RX ORDER — LISINOPRIL 40 MG/1
40 TABLET ORAL DAILY
Qty: 90 TABLET | Refills: 3 | Status: SHIPPED | OUTPATIENT
Start: 2022-04-22 | End: 2023-04-24

## 2022-04-22 RX ORDER — CHOLECALCIFEROL (VITAMIN D3) 50 MCG
50 TABLET ORAL DAILY
Qty: 100 TABLET | Refills: 3 | Status: SHIPPED | OUTPATIENT
Start: 2022-04-22 | End: 2023-06-09

## 2022-04-22 RX ORDER — SERTRALINE HYDROCHLORIDE 100 MG/1
100 TABLET, FILM COATED ORAL DAILY
Qty: 90 TABLET | Refills: 3 | Status: SHIPPED | OUTPATIENT
Start: 2022-04-22 | End: 2023-04-24

## 2022-04-22 RX ORDER — ATORVASTATIN CALCIUM 40 MG/1
40 TABLET, FILM COATED ORAL DAILY
Qty: 90 TABLET | Refills: 3 | Status: SHIPPED | OUTPATIENT
Start: 2022-04-22 | End: 2023-04-24

## 2022-04-22 ASSESSMENT — ANXIETY QUESTIONNAIRES
1. FEELING NERVOUS, ANXIOUS, OR ON EDGE: NOT AT ALL
5. BEING SO RESTLESS THAT IT IS HARD TO SIT STILL: NOT AT ALL
2. NOT BEING ABLE TO STOP OR CONTROL WORRYING: NOT AT ALL
1. FEELING NERVOUS, ANXIOUS, OR ON EDGE: NOT AT ALL
GAD7 TOTAL SCORE: 0
GAD7 TOTAL SCORE: 0
7. FEELING AFRAID AS IF SOMETHING AWFUL MIGHT HAPPEN: NOT AT ALL
2. NOT BEING ABLE TO STOP OR CONTROL WORRYING: NOT AT ALL
3. WORRYING TOO MUCH ABOUT DIFFERENT THINGS: NOT AT ALL
6. BECOMING EASILY ANNOYED OR IRRITABLE: NOT AT ALL
IF YOU CHECKED OFF ANY PROBLEMS ON THIS QUESTIONNAIRE, HOW DIFFICULT HAVE THESE PROBLEMS MADE IT FOR YOU TO DO YOUR WORK, TAKE CARE OF THINGS AT HOME, OR GET ALONG WITH OTHER PEOPLE: NOT DIFFICULT AT ALL
IF YOU CHECKED OFF ANY PROBLEMS ON THIS QUESTIONNAIRE, HOW DIFFICULT HAVE THESE PROBLEMS MADE IT FOR YOU TO DO YOUR WORK, TAKE CARE OF THINGS AT HOME, OR GET ALONG WITH OTHER PEOPLE: NOT DIFFICULT AT ALL
7. FEELING AFRAID AS IF SOMETHING AWFUL MIGHT HAPPEN: NOT AT ALL
3. WORRYING TOO MUCH ABOUT DIFFERENT THINGS: NOT AT ALL
6. BECOMING EASILY ANNOYED OR IRRITABLE: NOT AT ALL
4. TROUBLE RELAXING: NOT AT ALL
5. BEING SO RESTLESS THAT IT IS HARD TO SIT STILL: NOT AT ALL

## 2022-04-22 ASSESSMENT — PATIENT HEALTH QUESTIONNAIRE - PHQ9
SUM OF ALL RESPONSES TO PHQ QUESTIONS 1-9: 1
5. POOR APPETITE OR OVEREATING: NOT AT ALL

## 2022-04-22 ASSESSMENT — ACTIVITIES OF DAILY LIVING (ADL): CURRENT_FUNCTION: NO ASSISTANCE NEEDED

## 2022-04-22 NOTE — PATIENT INSTRUCTIONS
Patient Education   Personalized Prevention Plan  You are due for the preventive services outlined below.  Your care team is available to assist you in scheduling these services.  If you have already completed any of these items, please share that information with your care team to update in your medical record.  Health Maintenance Due   Topic Date Due    ANNUAL REVIEW OF HM ORDERS  Never done    Zoster (Shingles) Vaccine (1 of 2) Never done    Anxiety Assessment  06/10/2020    Depression Assessment  06/10/2020    FALL RISK ASSESSMENT  02/18/2021    Flu Vaccine (1) Never done    Colorectal Cancer Screening  10/20/2021    Mammogram  12/23/2021        -Get your 4th COVID booster when you return from vacation  -Consider Shingles vaccine  -Schedule mammogram (here at [facility])  -Someone will call you to schedule a colonoscopy (at [facility])  -Labs today   -Medications refilled today  -Schedule bone density scan (here at [facility])

## 2022-04-22 NOTE — PROGRESS NOTES
"SUBJECTIVE:   Marciano Akins is a 71 year old female who presents for Preventive Visit.      Patient has been advised of split billing requirements and indicates understanding: Yes     Are you in the first 12 months of your Medicare coverage?  No    Healthy Habits:     In general, how would you rate your overall health?  Good    Frequency of exercise:  4-5 days/week    Duration of exercise:  15-30 minutes    Do you usually eat at least 4 servings of fruit and vegetables a day, include whole grains    & fiber and avoid regularly eating high fat or \"junk\" foods?  Yes    Taking medications regularly:  Yes    Barriers to taking medications:  None    Medication side effects:  None    Ability to successfully perform activities of daily living:  No assistance needed    Home Safety:  No safety concerns identified    Hearing Impairment:  No hearing concerns    In the past 6 months, have you been bothered by leaking of urine?  No    In general, how would you rate your overall mental or emotional health?  Good      PHQ-2 Total Score: 0    Additional concerns today:  Yes (Skin Concern)    Health Maintenance Screening:    -Immunizations:   -Influenza: Declines   -Pneumococcal: Up to date   -Td/Tdap: Up to date   -Shingles: Due, discussed recommendation- advised to obtain at any local pharmacy   -HPV: NA   -MMR: NA   -COVID Up to date- Due for 4th vaccine/Booster; reviewed recent recommendations and guidelines.      -Colon Cancer Screening:  Due; declines changes in bowel habits, no blood in stool.  Agrees to colonoscopy    -Lung Cancer Screening:  NA    -Breast Cancer Screening: Due    -Cervical Cancer Screening:  NA, aged out    -STD Screen:  NA, low risk    -Bone Density Screening: Bone density screening in 2019 showed osteopenia.  Patient is taking calcium twice daily and vitamin D.  No history of bone fractures.    -Cholesterol Screening:  Due, maintained on atorvastatin 40 mg daily    -Diabetes Screening:  " Due    -Depression Screening: Due; maintained on Zoloft 100 mg/day.  Reports starting this when her  got ill a few years ago.  Feels very stable on medication.  Mood is good.  No side effects from medication.    PATIENT HEALTH QUESTIONNAIRE-9 (PHQ - 9)    Over the last 2 weeks, how often have you been bothered by any of the following problems?    1. Little interest or pleasure in doing things -  Not at all   2. Feeling down, depressed, or hopeless -  Not at all   3. Trouble falling or staying asleep, or sleeping too much - Not at all   4. Feeling tired or having little energy -  Several days   5. Poor appetite or overeating -  Not at all   6. Feeling bad about yourself - or that you are a failure or have let yourself or your family down -  Not at all   7. Trouble concentrating on things, such as reading the newspaper or watching television - Not at all   8. Moving or speaking so slowly that other people could have noticed? Or the opposite - being so fidgety or restless that you have been moving around a lot more than usual Not at all   9. Thoughts that you would be better off dead or of hurting  yourself in some way Not at all   Total Score: 1     If you checked off any problems, how difficult have these problems made it for you to do your work, take care of things at home, or get along with other people? Not difficult at all    Developed by Fauzia Bender, Regina Clay, Pramod Toledo and colleagues, with an educational elizabeth from [employer] No permission required to reproduce, translate, display or distribute. permission required to reproduce, translate, display or distribute.    MALLORIE-7 SCORE 6/10/2019 4/22/2022 4/22/2022   Total Score 2 0 0       Other Health Concerns:    HTN:  Blood pressure well controlled.  Patient is maintained on lisinopril 40 mg daily and amlodipine 7.5 mg daily.  Denies chest pain, headaches, dizziness, shortness of breath.  Needs refills today.      Do you feel safe in  your environment? Yes    Have you ever done Advance Care Planning? (For example, a Health Directive, POLST, or a discussion with a medical provider or your loved ones about your wishes): No, advance care planning information given to patient to review.  Patient declined advance care planning discussion at this time.       Fall risk   No falls within the last year.    Cognitive Screening Unable to complete due to slight language  barrier    Do you have sleep apnea, excessive snoring or daytime drowsiness?: no    Reviewed and updated as needed this visit by clinical staff    Allergies  Meds                Reviewed and updated as needed this visit by Provider                   Social History     Tobacco Use     Smoking status: Never Smoker     Smokeless tobacco: Never Used   Substance Use Topics     Alcohol use: No     Alcohol/week: 0.0 standard drinks     If you drink alcohol do you typically have >3 drinks per day or >7 drinks per week? No    Alcohol Use 2/18/2020   Prescreen: >3 drinks/day or >7 drinks/week? No   Prescreen: >3 drinks/day or >7 drinks/week? -         Current providers sharing in care for this patient include:   Patient Care Team:  Latasha Mistry APRN CNP as PCP - General (Internal Medicine)  Sara Estrada MD (Family Practice)  Latasha Mistry APRN CNP as Assigned PCP    The following health maintenance items are reviewed in Epic and correct as of today:  Health Maintenance Due   Topic Date Due     ANNUAL REVIEW OF HM ORDERS  Never done     ZOSTER IMMUNIZATION (1 of 2) Never done     MALLORIE ASSESSMENT  06/10/2020     PHQ-9  06/10/2020     MEDICARE ANNUAL WELLNESS VISIT  02/18/2021     FALL RISK ASSESSMENT  02/18/2021     INFLUENZA VACCINE (1) Never done     COLORECTAL CANCER SCREENING  10/20/2021     MAMMO SCREENING  12/23/2021     PHQ-2 (once per calendar year)  01/01/2022     Lab work is in process  Labs reviewed in EPIC  Advised COVID booster  Advised shingles  "vaccine        Review of Systems  CONSTITUTIONAL: NEGATIVE for fever, chills, change in weight  INTEGUMENTARY/SKIN: NEGATIVE for worrisome rashes, moles or lesions  EYES: NEGATIVE for vision changes or irritation  ENT/MOUTH: NEGATIVE for ear, mouth and throat problems  RESP: NEGATIVE for significant cough or SOB  BREAST: NEGATIVE for masses, tenderness or discharge  CV: NEGATIVE for chest pain, palpitations or peripheral edema  GI: NEGATIVE for nausea, abdominal pain, heartburn, or change in bowel habits  : NEGATIVE for frequency, dysuria, or hematuria  MUSCULOSKELETAL: NEGATIVE for significant arthralgias or myalgia  NEURO: NEGATIVE for weakness, dizziness or paresthesias  ENDOCRINE: NEGATIVE for temperature intolerance, skin/hair changes  HEME: NEGATIVE for bleeding problems  PSYCHIATRIC: NEGATIVE for changes in mood or affect    OBJECTIVE:   /74 (BP Location: Left arm, Cuff Size: Adult Regular)   Pulse 72   Temp 97.5  F (36.4  C) (Oral)   Ht 1.556 m (5' 1.25\")   Wt 70.6 kg (155 lb 9.6 oz)   SpO2 97%   BMI 29.16 kg/m   Estimated body mass index is 29.16 kg/m  as calculated from the following:    Height as of this encounter: 1.556 m (5' 1.25\").    Weight as of this encounter: 70.6 kg (155 lb 9.6 oz).     Physical Exam  GENERAL APPEARANCE: healthy, alert and no distress  EYES: Eyes grossly normal to inspection, PERRL and conjunctivae and sclerae normal  HENT: ear canals and TM's normal, nose and mouth without ulcers or lesions, oropharynx clear and oral mucous membranes moist  NECK: no adenopathy, no asymmetry, masses, or scars and thyroid normal to palpation  RESP: lungs clear to auscultation - no rales, rhonchi or wheezes  CV: regular rate and rhythm, normal S1 S2, no S3 or S4, no murmur, click or rub, no peripheral edema and peripheral pulses strong  ABDOMEN: soft, nontender, no hepatosplenomegaly, no masses and bowel sounds normal  MS: no musculoskeletal defects are noted and gait is age " appropriate without ataxia  SKIN: no suspicious lesions or rashes  NEURO: Normal strength and tone, sensory exam grossly normal, mentation intact and speech normal  PSYCH: mentation appears normal and affect normal/bright    Diagnostic Test Results:  Labs reviewed in Epic    ASSESSMENT / PLAN:   Marciano was seen today for physical and wellness visit.    Diagnoses and all orders for this visit:    Encounter for Medicare annual wellness exam  -     REVIEW OF HEALTH MAINTENANCE PROTOCOL ORDERS  -     Colonoscopy ordered  -     Mammogram ordered  -     Labs ordered  -     Bone density scan ordered  -     Advised COVID booster (4th dose)  -     Advised shingles vaccine    Screen for colon cancer  -     Adult Gastro Ref - Procedure Only; Future    Visit for screening mammogram  -     MA SCREENING DIGITAL BILAT - Future  (s+30); Future    Benign essential hypertension  -     amLODIPine (NORVASC) 5 MG tablet; Take 1.5 tablets (7.5 mg) by mouth daily  -     lisinopril (ZESTRIL) 40 MG tablet; Take 1 tablet (40 mg) by mouth daily  -     Comprehensive metabolic panel (BMP + Alb, Alk Phos, ALT, AST, Total. Bili, TP); Future  -     CBC with platelets; Future  -     Comprehensive metabolic panel (BMP + Alb, Alk Phos, ALT, AST, Total. Bili, TP)  -     CBC with platelets  -     BP stable; continue current medications - refilled, labs today    Osteopenia, unspecified location  -     DX Hip/Pelvis/Spine; Future    Vitamin D deficiency  -     vitamin D3 (CHOLECALCIFEROL) 50 mcg (2000 units) tablet; Take 1 tablet (50 mcg) by mouth daily Reported on 3/10/2017  -     Vitamin D Deficiency; Future  -     Vitamin D Deficiency    Hyperlipidemia with target LDL less than 100  -     atorvastatin (LIPITOR) 40 MG tablet; Take 1 tablet (40 mg) by mouth daily  -     Lipid Profile (Chol, Trig, HDL, LDL calc); Future  -     Glucose; Future  -     Lipid Profile (Chol, Trig, HDL, LDL calc)  -     Glucose    Anxiety  -     sertraline (ZOLOFT) 100 MG  "tablet; Take 1 tablet (100 mg) by mouth daily 1 tablet daily  -     Mood stable; tolerating Zoloft no side effects.  Continue.  Refilled    Major depressive disorder with single episode, in full remission (H)  -     sertraline (ZOLOFT) 100 MG tablet; Take 1 tablet (100 mg) by mouth daily 1 tablet daily  -     Mood stable; tolerating Zoloft no side effects.  Continue.  Refilled    Panic attack  -     sertraline (ZOLOFT) 100 MG tablet; Take 1 tablet (100 mg) by mouth daily 1 tablet daily- stable continue    Asymptomatic menopausal state   -     DX Hip/Pelvis/Spine; Future        Patient has been advised of split billing requirements and indicates understanding: Yes    COUNSELING:  Reviewed preventive health counseling, as reflected in patient instructions    Estimated body mass index is 29.16 kg/m  as calculated from the following:    Height as of this encounter: 1.556 m (5' 1.25\").    Weight as of this encounter: 70.6 kg (155 lb 9.6 oz).        She reports that she has never smoked. She has never used smokeless tobacco.      Appropriate preventive services were discussed with this patient, including applicable screening as appropriate for cardiovascular disease, diabetes, osteopenia/osteoporosis, and glaucoma.  As appropriate for age/gender, discussed screening for colorectal cancer, prostate cancer, breast cancer, and cervical cancer. Checklist reviewing preventive services available has been given to the patient.    Reviewed patients plan of care and provided an AVS. The Basic Care Plan (routine screening as documented in Health Maintenance) for Marciano meets the Care Plan requirement. This Care Plan has been established and reviewed with the Patient.    Counseling Resources:  ATP IV Guidelines  Pooled Cohorts Equation Calculator  Breast Cancer Risk Calculator  Breast Cancer: Medication to Reduce Risk  FRAX Risk Assessment  ICSI Preventive Guidelines  Dietary Guidelines for Americans, 2010  USDA's MyPlate  ASA " Prophylaxis  Lung CA Screening    YUE Cheatham CNP  [facility]    Identified Health Risks:

## 2022-04-22 NOTE — LETTER
April 28, 2022      Marciano Akins  [address]  [place] [address]        Dear ,    We are writing to inform you of your test results.    -Your labs are overall stable.  Your glucose was just slightly elevated at 104, no concern at this time.  Your cholesterol was stable, please continue your statin    Resulted Orders   Comprehensive metabolic panel (BMP + Alb, Alk Phos, ALT, AST, Total. Bili, TP)   Result Value Ref Range    Sodium 138 133 - 144 mmol/L    Potassium 3.9 3.4 - 5.3 mmol/L    Chloride 105 94 - 109 mmol/L    Carbon Dioxide (CO2) 29 20 - 32 mmol/L    Anion Gap 4 3 - 14 mmol/L    Urea Nitrogen 13 7 - 30 mg/dL    Creatinine 0.82 0.52 - 1.04 mg/dL    Calcium 8.9 8.5 - 10.1 mg/dL    Glucose 104 (H) 70 - 99 mg/dL    Alkaline Phosphatase 125 40 - 150 U/L    AST 17 0 - 45 U/L    ALT 25 0 - 50 U/L    Protein Total 8.1 6.8 - 8.8 g/dL    Albumin 4.0 3.4 - 5.0 g/dL    Bilirubin Total 0.5 0.2 - 1.3 mg/dL    GFR Estimate 75 >60 mL/min/1.73m2      Comment:      Effective December 21, 2021 eGFRcr in adults is calculated using the 2021 CKD-EPI creatinine equation which includes age and gender (Becky crowell al., NEJM, DOI: 10.1056/FQHYbo6203160)   Lipid Profile (Chol, Trig, HDL, LDL calc)   Result Value Ref Range    Cholesterol 123 <200 mg/dL    Triglycerides 93 <150 mg/dL    Direct Measure HDL 36 (L) >=50 mg/dL    LDL Cholesterol Calculated 68 <=100 mg/dL    Non HDL Cholesterol 87 <130 mg/dL    Patient Fasting > 8hrs? Yes     Narrative    Cholesterol  Desirable:  <200 mg/dL    Triglycerides  Normal:  Less than 150 mg/dL  Borderline High:  150-199 mg/dL  High:  200-499 mg/dL  Very High:  Greater than or equal to 500 mg/dL    Direct Measure HDL  Female:  Greater than or equal to 50 mg/dL   Male:  Greater than or equal to 40 mg/dL    LDL Cholesterol  Desirable:  <100mg/dL  Above Desirable:  100-129 mg/dL   Borderline High:  130-159 mg/dL   High:  160-189 mg/dL   Very High:  >= 190  mg/dL    Non HDL Cholesterol  Desirable:  130 mg/dL  Above Desirable:  130-159 mg/dL  Borderline High:  160-189 mg/dL  High:  190-219 mg/dL  Very High:  Greater than or equal to 220 mg/dL   Glucose   Result Value Ref Range    Glucose 104 (H) 70 - 99 mg/dL    Patient Fasting > 8hrs? Yes    CBC with platelets   Result Value Ref Range    WBC Count 5.5 4.0 - 11.0 10e3/uL    RBC Count 5.25 (H) 3.80 - 5.20 10e6/uL    Hemoglobin 14.9 11.7 - 15.7 g/dL    Hematocrit 46.5 35.0 - 47.0 %    MCV 89 78 - 100 fL    MCH 28.4 26.5 - 33.0 pg    MCHC 32.0 31.5 - 36.5 g/dL    RDW 13.6 10.0 - 15.0 %    Platelet Count 252 150 - 450 10e3/uL   Vitamin D Deficiency   Result Value Ref Range    Vitamin D, Total (25-Hydroxy) 29 20 - 75 ug/L    Narrative    Season, race, dietary intake, and treatment affect the concentration of 25-hydroxy-Vitamin D. Values may decrease during winter months and increase during summer months. Values 20-29 ug/L may indicate Vitamin D insufficiency and values <20 ug/L may indicate Vitamin D deficiency.    Vitamin D determination is routinely performed by an immunoassay specific for 25 hydroxyvitamin D3.  If an individual is on vitamin D2(ergocalciferol) supplementation, please specify 25 OH vitamin D2 and D3 level determination by LCMSMS test [id number].         If you have any questions or concerns, please call the clinic at the number listed above.       Sincerely,      YUE Cheatham CNP

## 2022-04-23 ASSESSMENT — ANXIETY QUESTIONNAIRES: GAD7 TOTAL SCORE: 0

## 2022-07-19 ENCOUNTER — ANCILLARY PROCEDURE (OUTPATIENT)
Dept: MAMMOGRAPHY | Facility: CLINIC | Age: 72
End: 2022-07-19
Payer: COMMERCIAL

## 2022-07-19 DIAGNOSIS — Z12.31 VISIT FOR SCREENING MAMMOGRAM: ICD-10-CM

## 2022-07-19 PROCEDURE — 77067 SCR MAMMO BI INCL CAD: CPT | Mod: TC | Performed by: RADIOLOGY

## 2022-07-19 PROCEDURE — 77063 BREAST TOMOSYNTHESIS BI: CPT | Mod: TC | Performed by: RADIOLOGY

## 2022-10-30 ENCOUNTER — HEALTH MAINTENANCE LETTER (OUTPATIENT)
Age: 72
End: 2022-10-30

## 2023-04-24 ENCOUNTER — OFFICE VISIT (OUTPATIENT)
Dept: INTERNAL MEDICINE | Facility: CLINIC | Age: 73
End: 2023-04-24
Payer: COMMERCIAL

## 2023-04-24 VITALS
TEMPERATURE: 97.9 F | OXYGEN SATURATION: 95 % | DIASTOLIC BLOOD PRESSURE: 68 MMHG | HEIGHT: 61 IN | WEIGHT: 164.5 LBS | HEART RATE: 77 BPM | SYSTOLIC BLOOD PRESSURE: 138 MMHG | BODY MASS INDEX: 31.06 KG/M2

## 2023-04-24 DIAGNOSIS — F41.0 PANIC ATTACK: ICD-10-CM

## 2023-04-24 DIAGNOSIS — E78.5 HYPERLIPIDEMIA WITH TARGET LDL LESS THAN 100: ICD-10-CM

## 2023-04-24 DIAGNOSIS — Z00.00 ENCOUNTER FOR MEDICARE ANNUAL WELLNESS EXAM: Primary | ICD-10-CM

## 2023-04-24 DIAGNOSIS — Z23 NEED FOR SHINGLES VACCINE: ICD-10-CM

## 2023-04-24 DIAGNOSIS — Z13.6 CARDIOVASCULAR SCREENING; LDL GOAL LESS THAN 130: ICD-10-CM

## 2023-04-24 DIAGNOSIS — F32.5 MAJOR DEPRESSIVE DISORDER WITH SINGLE EPISODE, IN FULL REMISSION (H): ICD-10-CM

## 2023-04-24 DIAGNOSIS — I10 BENIGN ESSENTIAL HYPERTENSION: ICD-10-CM

## 2023-04-24 DIAGNOSIS — F41.9 ANXIETY: ICD-10-CM

## 2023-04-24 DIAGNOSIS — E55.9 VITAMIN D DEFICIENCY: ICD-10-CM

## 2023-04-24 LAB
ALT SERPL W P-5'-P-CCNC: 15 U/L (ref 10–35)
ANION GAP SERPL CALCULATED.3IONS-SCNC: 14 MMOL/L (ref 7–15)
AST SERPL W P-5'-P-CCNC: 26 U/L (ref 10–35)
BUN SERPL-MCNC: 11.1 MG/DL (ref 8–23)
CALCIUM SERPL-MCNC: 9.5 MG/DL (ref 8.8–10.2)
CHLORIDE SERPL-SCNC: 104 MMOL/L (ref 98–107)
CHOLEST SERPL-MCNC: 137 MG/DL
CREAT SERPL-MCNC: 0.69 MG/DL (ref 0.51–0.95)
DEPRECATED HCO3 PLAS-SCNC: 22 MMOL/L (ref 22–29)
ERYTHROCYTE [DISTWIDTH] IN BLOOD BY AUTOMATED COUNT: 13.4 % (ref 10–15)
GFR SERPL CREATININE-BSD FRML MDRD: >90 ML/MIN/1.73M2
GLUCOSE SERPL-MCNC: 102 MG/DL (ref 70–99)
HCT VFR BLD AUTO: 42.5 % (ref 35–47)
HDLC SERPL-MCNC: 40 MG/DL
HGB BLD-MCNC: 14 G/DL (ref 11.7–15.7)
LDLC SERPL CALC-MCNC: 76 MG/DL
MCH RBC QN AUTO: 28.9 PG (ref 26.5–33)
MCHC RBC AUTO-ENTMCNC: 32.9 G/DL (ref 31.5–36.5)
MCV RBC AUTO: 88 FL (ref 78–100)
NONHDLC SERPL-MCNC: 97 MG/DL
PLATELET # BLD AUTO: 240 10E3/UL (ref 150–450)
POTASSIUM SERPL-SCNC: 3.8 MMOL/L (ref 3.4–5.3)
RBC # BLD AUTO: 4.84 10E6/UL (ref 3.8–5.2)
SODIUM SERPL-SCNC: 140 MMOL/L (ref 136–145)
TRIGL SERPL-MCNC: 104 MG/DL
WBC # BLD AUTO: 8.1 10E3/UL (ref 4–11)

## 2023-04-24 PROCEDURE — 80061 LIPID PANEL: CPT | Performed by: INTERNAL MEDICINE

## 2023-04-24 PROCEDURE — 84450 TRANSFERASE (AST) (SGOT): CPT | Performed by: INTERNAL MEDICINE

## 2023-04-24 PROCEDURE — 80048 BASIC METABOLIC PNL TOTAL CA: CPT | Performed by: INTERNAL MEDICINE

## 2023-04-24 PROCEDURE — 36415 COLL VENOUS BLD VENIPUNCTURE: CPT | Performed by: INTERNAL MEDICINE

## 2023-04-24 PROCEDURE — 84460 ALANINE AMINO (ALT) (SGPT): CPT | Performed by: INTERNAL MEDICINE

## 2023-04-24 PROCEDURE — 85027 COMPLETE CBC AUTOMATED: CPT | Performed by: INTERNAL MEDICINE

## 2023-04-24 PROCEDURE — 99214 OFFICE O/P EST MOD 30 MIN: CPT | Mod: 25 | Performed by: INTERNAL MEDICINE

## 2023-04-24 PROCEDURE — G0439 PPPS, SUBSEQ VISIT: HCPCS | Performed by: INTERNAL MEDICINE

## 2023-04-24 RX ORDER — LISINOPRIL 40 MG/1
40 TABLET ORAL EVERY MORNING
Qty: 90 TABLET | Refills: 3 | Status: SHIPPED | OUTPATIENT
Start: 2023-04-24 | End: 2024-04-25 | Stop reason: SINTOL

## 2023-04-24 RX ORDER — AMLODIPINE BESYLATE 10 MG/1
10 TABLET ORAL EVERY EVENING
Qty: 90 TABLET | Refills: 3 | Status: SHIPPED | OUTPATIENT
Start: 2023-04-24 | End: 2024-04-25

## 2023-04-24 RX ORDER — SERTRALINE HYDROCHLORIDE 100 MG/1
100 TABLET, FILM COATED ORAL DAILY
Qty: 90 TABLET | Refills: 3 | Status: SHIPPED | OUTPATIENT
Start: 2023-04-24 | End: 2024-04-25

## 2023-04-24 RX ORDER — ATORVASTATIN CALCIUM 40 MG/1
40 TABLET, FILM COATED ORAL EVERY MORNING
Qty: 90 TABLET | Refills: 3 | Status: SHIPPED | OUTPATIENT
Start: 2023-04-24 | End: 2024-04-25

## 2023-04-24 ASSESSMENT — ENCOUNTER SYMPTOMS
JOINT SWELLING: 0
EYE PAIN: 0
ARTHRALGIAS: 0
PALPITATIONS: 0
SHORTNESS OF BREATH: 0
FREQUENCY: 0
WEAKNESS: 0
CHILLS: 0
BREAST MASS: 0
DIARRHEA: 0
COUGH: 0
HEADACHES: 0
ABDOMINAL PAIN: 0
CONSTIPATION: 0
PARESTHESIAS: 0
SORE THROAT: 0
DYSURIA: 0
NAUSEA: 0
HEMATOCHEZIA: 0
FEVER: 0
MYALGIAS: 0
HEARTBURN: 1
HEMATURIA: 0
NERVOUS/ANXIOUS: 0
DIZZINESS: 0

## 2023-04-24 ASSESSMENT — ANXIETY QUESTIONNAIRES
IF YOU CHECKED OFF ANY PROBLEMS ON THIS QUESTIONNAIRE, HOW DIFFICULT HAVE THESE PROBLEMS MADE IT FOR YOU TO DO YOUR WORK, TAKE CARE OF THINGS AT HOME, OR GET ALONG WITH OTHER PEOPLE: NOT DIFFICULT AT ALL
4. TROUBLE RELAXING: NOT AT ALL
GAD7 TOTAL SCORE: 0
3. WORRYING TOO MUCH ABOUT DIFFERENT THINGS: NOT AT ALL
GAD7 TOTAL SCORE: 0
7. FEELING AFRAID AS IF SOMETHING AWFUL MIGHT HAPPEN: NOT AT ALL
2. NOT BEING ABLE TO STOP OR CONTROL WORRYING: NOT AT ALL
7. FEELING AFRAID AS IF SOMETHING AWFUL MIGHT HAPPEN: NOT AT ALL
1. FEELING NERVOUS, ANXIOUS, OR ON EDGE: NOT AT ALL
8. IF YOU CHECKED OFF ANY PROBLEMS, HOW DIFFICULT HAVE THESE MADE IT FOR YOU TO DO YOUR WORK, TAKE CARE OF THINGS AT HOME, OR GET ALONG WITH OTHER PEOPLE?: NOT DIFFICULT AT ALL
6. BECOMING EASILY ANNOYED OR IRRITABLE: NOT AT ALL
5. BEING SO RESTLESS THAT IT IS HARD TO SIT STILL: NOT AT ALL
GAD7 TOTAL SCORE: 0

## 2023-04-24 ASSESSMENT — PATIENT HEALTH QUESTIONNAIRE - PHQ9
10. IF YOU CHECKED OFF ANY PROBLEMS, HOW DIFFICULT HAVE THESE PROBLEMS MADE IT FOR YOU TO DO YOUR WORK, TAKE CARE OF THINGS AT HOME, OR GET ALONG WITH OTHER PEOPLE: NOT DIFFICULT AT ALL
SUM OF ALL RESPONSES TO PHQ QUESTIONS 1-9: 0
SUM OF ALL RESPONSES TO PHQ QUESTIONS 1-9: 0

## 2023-04-24 ASSESSMENT — ACTIVITIES OF DAILY LIVING (ADL): CURRENT_FUNCTION: NO ASSISTANCE NEEDED

## 2023-04-24 ASSESSMENT — PAIN SCALES - GENERAL: PAINLEVEL: NO PAIN (0)

## 2023-04-24 NOTE — PROGRESS NOTES
"SUBJECTIVE:   Marciano is a 72 year old who presents for Preventive Visit.  Roomed by Pamela GENTILE CMA  Patient has been advised of split billing requirements and indicates understanding: Yes  Are you in the first 12 months of your Medicare coverage?  No    Healthy Habits:     In general, how would you rate your overall health?  Good    Frequency of exercise:  None    Do you usually eat at least 4 servings of fruit and vegetables a day, include whole grains    & fiber and avoid regularly eating high fat or \"junk\" foods?  Yes    Taking medications regularly:  Yes    Barriers to taking medications:  None    Medication side effects:  None    Ability to successfully perform activities of daily living:  No assistance needed    Home Safety:  No safety concerns identified    Hearing Impairment:  No hearing concerns    In the past 6 months, have you been bothered by leaking of urine?  No    In general, how would you rate your overall mental or emotional health?  Good      PHQ-2 Total Score: 2    Additional concerns today:  Yes    Have you ever done Advance Care Planning? (For example, a Health Directive, POLST, or a discussion with a medical provider or your loved ones about your wishes): No, advance care planning information given to patient to review.  Patient plans to discuss their wishes with loved ones or provider.      Fall risk  Fallen 2 or more times in the past year?: No  Any fall with injury in the past year?: No    Cognitive Screening   1) Repeat 3 items (Leader, Season, Table)    2) Clock draw: NORMAL  3) 3 item recall: Recalls 3 objects  Results: 3 items recalled: COGNITIVE IMPAIRMENT LESS LIKELY    Mini-CogTM Copyright S Ivon. Licensed by the author for use in [facility]; reprinted with permission ([email or web address]@.[email or web address]). All rights reserved.      Do you have sleep apnea, excessive snoring or daytime drowsiness?: no    Reviewed and updated as needed this visit by clinical staff   Tobacco  Allergies  Meds "  Problems             Reviewed and updated as needed this visit by Provider    Allergies  Meds  Problems            Social History     Tobacco Use     Smoking status: Never     Smokeless tobacco: Never   Vaping Use     Vaping status: Not on file   Substance Use Topics     Alcohol use: No     Alcohol/week: 0.0 standard drinks of alcohol           4/24/2023     2:07 PM   Alcohol Use   Prescreen: >3 drinks/day or >7 drinks/week? No     Do you have a current opioid prescription? No  Do you use any other controlled substances or medications that are not prescribed by a provider? None      1.    Hypertension:  History of hypertension, on medication.  No reported side effects from medications.    Reviewed last 6 BP readings in chart:  BP Readings from Last 6 Encounters:   04/24/23 138/68   04/22/22 134/74   12/17/21 (!) 178/84   02/10/21 130/80   02/18/20 (!) 156/86   06/10/19 138/72     No active cardiac complaints or symptoms with exercise.       2.  Hyperlipidemia:  Has history of hyperlipidemia.    The patient is taking a medication for this.  Denies any significant side effects from his medication.      Latest labs reviewed:    Recent Labs   Lab Test 04/22/22  1032 02/10/21  0836   CHOL 123 292*   HDL 36* 40*   LDL 68 207*   TRIG 93 223*        Lab Results   Component Value Date    AST 17 04/22/2022    AST 14 02/10/2021           3.  Depression and anxiety:  Has known history of depression and anxiety.  Has been on medication for this.  The patient does not report any significant side effects of this medication.  The prior symptoms leading to the original diagnosis and decision to start medication therapy are better.     Appetite is stable.  Sleeping patterns are stable.    No reported thoughts of suicide or homicide.        6/10/2019     3:53 PM 4/22/2022    10:10 AM 4/24/2023     2:01 PM   PHQ   PHQ-9 Total Score 0 1 0   Q9: Thoughts of better off dead/self-harm past 2 weeks Not at all Not at all Not at all               Current providers sharing in care for this patient include:   Patient Care Team:  Sara Estrada MD ([facility])  Latasha Membreno APRN CNP as Assigned PCP    The following health maintenance items are reviewed in Epic and correct as of today:  Health Maintenance   Topic Date Due     DEPRESSION ACTION PLAN  Never done     ZOSTER IMMUNIZATION (1 of 2) Never done     COVID-19 Vaccine (4 - Booster for Pfizer series) 01/10/2022     INFLUENZA VACCINE (1) Never done     MEDICARE ANNUAL WELLNESS VISIT  04/24/2024     MALLORIE ASSESSMENT  04/24/2024     ANNUAL REVIEW OF HM ORDERS  04/24/2024     FALL RISK ASSESSMENT  04/24/2024     PHQ-9  04/24/2024     MAMMO SCREENING  07/19/2024     DTAP/TDAP/TD IMMUNIZATION (2 - Td or Tdap) 10/30/2025     LIPID  04/24/2028     ADVANCE CARE PLANNING  04/24/2028     COLORECTAL CANCER SCREENING  06/16/2032     DEXA  06/23/2037     HEPATITIS C SCREENING  Completed     Pneumococcal Vaccine: 65+ Years  Completed     IPV IMMUNIZATION  Aged Out     MENINGITIS IMMUNIZATION  Aged Out         **I reviewed the information recorded in the patient's EPIC chart (including but not limited to medical history, surgical history, family history, problem list, medication list, and allergy list) and updated the information as indicated based on the patients reported information.             Review of Systems   Constitutional: Negative for chills and fever.   HENT: Negative for congestion, ear pain, hearing loss and sore throat.    Eyes: Negative for pain and visual disturbance.   Respiratory: Negative for cough and shortness of breath.    Cardiovascular: Negative for chest pain, palpitations and peripheral edema.   Gastrointestinal: Positive for heartburn. Negative for abdominal pain, constipation, diarrhea, hematochezia and nausea.   Breasts:  Negative for tenderness, breast mass and discharge.   Genitourinary: Negative for dysuria, frequency, genital sores, hematuria, pelvic pain,  "urgency, vaginal bleeding and vaginal discharge.   Musculoskeletal: Negative for arthralgias, joint swelling and myalgias.   Skin: Negative for rash.   Neurological: Negative for dizziness, weakness, headaches and paresthesias.   Psychiatric/Behavioral: Negative for mood changes. The patient is not nervous/anxious.      Constitutional, HEENT, cardiovascular, pulmonary, gi and gu systems are negative, except as otherwise noted.    OBJECTIVE:   /68   Pulse 77   Temp 97.9  F (36.6  C) (Oral)   Ht 1.549 m (5' 1\")   Wt 74.6 kg (164 lb 8 oz)   LMP  (LMP Unknown)   SpO2 95%   Breastfeeding No   BMI 31.08 kg/m   Estimated body mass index is 31.08 kg/m  as calculated from the following:    Height as of this encounter: 1.549 m (5' 1\").    Weight as of this encounter: 74.6 kg (164 lb 8 oz).  Physical Exam  GENERAL alert and no distress  EYES:  Normal sclera,conjunctiva, EOMI  HENT: oral and posterior pharynx without lesions or erythema, facies symmetric  NECK: Neck supple. No LAD, without thyroidmegaly.  RESP: Clear to ausculation bilaterally without wheezes or crackles. Normal BS in all fields.  CV: RRR normal S1S2 without murmurs, rubs or gallops.  LYMPH: no cervical lymph adenopathy appreciated  MS: extremities- no gross deformities of the visible extremities noted,   EXT:  no lower extremity edema  PSYCH: Alert and oriented times 3; speech- coherent  SKIN:  No obvious significant skin lesions on visible portions of face     Diagnostic Test Results:  Labs reviewed in Epic    ASSESSMENT / PLAN:     (Z00.00) Encounter for Medicare annual wellness exam  (primary encounter diagnosis)  Comment: Discussed cardiac disease risk factors and cardiac disease risk factor modification, including diabetes screening, blood pressure screening (and management if indicated), and cholesterol screening.   Reviewed immunzation guidelines, including pneumococcal vaccines, annual influenza, and shingles vaccines.   Discussed routine " cancer screenings, including skin cancer, colon cancer screening for everyone until age 80, prostate cancer screening in men until age 75, mammogram and PAP/pelvic for women until age 75.   Recommended regular dentist visits to care for remaining teeth.   Recommended regular screening for vision and glaucoma.   Recommended safe driving and accident avoidance.   Plan: PRIMARY CARE FOLLOW-UP SCHEDULING            (F32.5) Major depressive disorder with single episode, in full remission (H)  Comment: This condition is currently controlled on the current medical regimen.  Continue current therapy.   She has not experienced any significant side effects of this medication.   Plan: sertraline (ZOLOFT) 100 MG tablet, PRIMARY CARE        FOLLOW-UP SCHEDULING            (F41.0) Panic attack  Comment: This condition is currently controlled on the current medical regimen.  Continue current therapy.   Plan: sertraline (ZOLOFT) 100 MG tablet, PRIMARY CARE        FOLLOW-UP SCHEDULING            (I10) Benign essential hypertension  Comment: This condition is currently controlled on the current medical regimen.  Continue current therapy.   Plan: lisinopril (ZESTRIL) 40 MG tablet, amLODIPine         (NORVASC) 10 MG tablet, PRIMARY CARE FOLLOW-UP         SCHEDULING, CBC with platelets, Basic metabolic        panel            (F41.9) Anxiety  Comment: This condition is currently controlled on the current medical regimen.  Continue current therapy.   Plan: sertraline (ZOLOFT) 100 MG tablet, PRIMARY CARE        FOLLOW-UP SCHEDULING            (E78.5) Hyperlipidemia with target LDL less than 100  Comment: This condition is currently controlled on the current medical regimen.  Continue current therapy.   Discussed guidelines recommending a statin cholesterol lowering medication for any patient with either diabetes and/or vascular disease, aiming for a LDL goal under 100 for sure, ideally under 70, using whatever dose of statin tolerated.     Plan: atorvastatin (LIPITOR) 40 MG tablet, PRIMARY         CARE FOLLOW-UP SCHEDULING, Lipid panel reflex         to direct LDL Fasting, AST, ALT            (E55.9) Vitamin D deficiency  Comment:   Plan: PRIMARY CARE FOLLOW-UP SCHEDULING            (Z13.6) CARDIOVASCULAR SCREENING; LDL GOAL LESS THAN 130  Comment: Discussed cardiac disease risk factors and cardiac disease risk factor modification.   Plan: PRIMARY CARE FOLLOW-UP SCHEDULING, CBC with         platelets, Basic metabolic panel, Lipid panel         reflex to direct LDL Fasting, AST, ALT            (Z23) Need for shingles vaccine  Comment: get from pharmacy where it will be cheaper  Plan: PRIMARY CARE FOLLOW-UP SCHEDULING               Patient has been advised of split billing requirements and indicates understanding: Yes      COUNSELING:  Reviewed preventive health counseling, as reflected in patient instructions       Regular exercise       Healthy diet/nutrition       Vision screening       Hearing screening       Dental care       Bladder control       Fall risk prevention       Immunizations    Covid vaccines are now recommended annually.  Get the most updated Covid vaccine when it becomes available, consider getting this at the same time as the annual influenza vaccine.    Get shingrix vaccine from [facility] where it will be cheaper.               She reports that she has never smoked. She has never used smokeless tobacco.      Appropriate preventive services were discussed with this patient, including applicable screening as appropriate for cardiovascular disease, diabetes, osteopenia/osteoporosis, and glaucoma.  As appropriate for age/gender, discussed screening for colorectal cancer, prostate cancer, breast cancer, and cervical cancer. Checklist reviewing preventive services available has been given to the patient.    Reviewed patients plan of care and provided an AVS. The  for Hatidza meets the Care Plan requirement. This Care Plan has been  established and reviewed with the .          Nixon Schneider MD  [facility]    Identified Health Risks:

## 2023-04-24 NOTE — PATIENT INSTRUCTIONS
"  Increase the Amlodipine to 10 mg once per day, this will be easier for you.      We are rechecking your labs.  I will let you know if the results indicate any changes in your therapy.  Unless you hear otherwise, continue all medications at the same doses.  Contact your usual pharmacy if you need refills.     Assuming your labs look good, then continue all other medications at the same doses.  Contact your usual pharmacy if you need refills.      Covid vaccines are now recommended annually.  Get the most updated Covid vaccine when it becomes available, consider getting this at the same time as the annual influenza vaccine.      Ask the pharmacist at the pharmacy about the Shingrix shingles vaccine.  With Medicare insurances, this vaccine is usually cheaper to get from a pharmacist in a pharmacy than from our clinic.      Return to see me in 1 year, sooner if needed.  Use [employer] or Call [phone] to schedule the appointment with me.         5 GOALS TO PREVENT VASCULAR DISEASE:     1.  Aggressive blood pressure control, under 130/80 ideally.  Using medications if needed.    Your blood pressure is under good control    BP Readings from Last 4 Encounters:   04/24/23 138/68   04/22/22 134/74   12/17/21 (!) 178/84   02/10/21 130/80       2.  Aggressive LDL cholesterol (\"bad cholesterol\") lowering as indicated.    Your goal is an LDL under 130 for sure, preferably under 100.  (If you have diabetes or previous vascular disease, the the LDL goals would be under 100 for sure, preferably under 70.)    New guidelines identify four high-risk groups who could benefit from statins:   *people with pre-existing heart disease, such as those who have had a heart attack;   *people ages 40 to 75 who have diabetes of any type  *patients ages 40 to 75 with at least a 7.5% risk of developing cardiovascular disease over the next decade, according to a formula described in the guidelines  *patients with the sort of super-high " cholesterol that sometimes runs in families, as evidenced by an LDL of 190 milligrams per deciliter or higher    Your cholesterol levels are well controlled.    Recent Labs   Lab Test 04/22/22  1032 02/10/21  0836   CHOL 123 292*   HDL 36* 40*   LDL 68 207*   TRIG 93 223*       3.  Aggressive diabetic prevention, screening and/or management.      You do not have diabetes as of the most recent blood tests.     4.  No smoking    5.  Consider daily preventative aspirin over age 50 if you have enough cardiac risk factors to place you at higher risk for the presence of vascular disease.    If you have any reason not to take aspirin such easy bruising or bleeding, stomach problems, other anticoagulant medications, or any other side effects, then you should not take Aspirin.     --Based on your current cardiac disease risk profile and/or age over 75, you do NOT need to take daily preventative aspirin.              Preventive Health Recommendations  Female Ages 75+    Yearly exam: See your health care provider every year in order to  Review health changes.   Discuss preventive care.    Review your medicines if your doctor has prescribed any.    Get a Pap test every three years (unless you have an abnormal result and your provider advises testing more often).  No more PAP smear needed.    You do not need a Pap test if your uterus was removed (hysterectomy) and you have not had cancer.  You should be tested each year for STDs (sexually transmitted diseases) if you're at risk.   Routine screening mammogram no longer indicated.  No routine screening colonoscopy indiacted   Have a cholesterol test every 5 years, or more often if advised.  Have a diabetes test (fasting glucose) every three years. If you are at risk for diabetes, you should have this test more often.   If you are at risk for osteoporosis (brittle bone disease), think about having a bone density scan (DEXA).    Shots:   *  Get a flu shot each year.   *  Get a  "tetanus shot every 10 years.    *  Pneumonia vaccine up to date.      Nutrition:   Eat at least 5 servings of fruits and vegetables each day.  Eat whole-grain bread, whole-wheat pasta and brown rice instead of white grains and rice.  Talk to your provider about Calcium and Vitamin D.    --Calcium: aim for 1200 mg per day (any brand is fine)   --Vitamin D3 at least 1000 units per day (any brand is fine)       --Good Grains:  Oats, brown rice, Quinoa (these do not raise the blood sugar as much)     --Bad grains:  Anything made from wheat or white rice     (because these raise the blood sugars significantly, and the possible gluten issue from wheat for some people).      --Proteins:  Aim for \"lean proteins\" including chicken, fish, seafood, pork, turkey, and eggs (in moderation); Eat red meat only occasionally        Lifestyle  Exercise at least 150 minutes a week (30 minutes a day, 5 days a week). This will help you control your weight and prevent disease.  Limit alcohol to one drink per day.  No smoking.   Wear sunscreen to prevent skin cancer.   See your dentist every six months for an exam and cleaning.  See your eye doctor every 1 to 2 years.          Patient Education   Personalized Prevention Plan  You are due for the preventive services outlined below.  Your care team is available to assist you in scheduling these services.  If you have already completed any of these items, please share that information with your care team to update in your medical record.  Health Maintenance Due   Topic Date Due    Depression Action Plan  Never done    Zoster (Shingles) Vaccine (1 of 2) Never done    COVID-19 Vaccine (4 - Booster for Pfizer series) 01/10/2022    Flu Vaccine (1) Never done    Annual Wellness Visit  04/22/2023       Exercise for a Healthier Heart  You may wonder how you can improve the health of your heart. If you re thinking about exercise, you re on the right track. You don t need to become an athlete. But you " do need a certain amount of brisk exercise to help strengthen your heart. If you have been diagnosed with a heart condition, your healthcare provider may advise exercise to help your condition. To help make exercise a habit, choose safe, fun activities.      Exercise with a friend. When activity is fun, you're more likely to stick with it.     Before you start  Check with your healthcare provider before starting an exercise program. This is especially important if you haven't been active for a while. It's also important if you have a long-term (chronic) health problem such as heart disease, diabetes, or obesity. Also check with your provider if you're at high risk for having these problems.   Why exercise?  Exercising regularly offers many healthy rewards. It can help you do all of these:   Improve your blood cholesterol level to help prevent further heart trouble.  Lower your blood pressure to help prevent a stroke or heart attack.  Control diabetes or reduce your risk of getting this disease.  Improve your heart and lung function.  Reach and stay at a healthy weight.  Make your muscles stronger so you can stay active.  Prevent falls and fractures by slowing the loss of bone mass (osteoporosis).  Manage stress better.  Improve your sense of self and your body image.  Exercise tips    Ease into your routine. Set small goals. Then build on them. Talk with your healthcare provider first before starting an exercise routine if you're not sure what your activity level should be.  Exercise on most days. Aim for a total of at least 150 minutes (2 hours and 30 minutes) or more of moderate-intensity aerobic activity each week. You could also do 75 minutes (1 hour and 15 minutes) or more of vigorous-intensity aerobic activity each week. Or try for a combination of both. Moderate activity means that you breathe heavier and your heart rate increases, but you can still talk. Think about doing at least 30 minutes of moderate  exercise, 5 times a week. It's OK to work up to the 30-minute period over time. Examples of moderate-intensity activity are brisk walking, gardening, and water aerobics.  Step up your daily activity level.  Along with your exercise program, try being more active the whole day. Walk instead of drive. Or park further away so that you take more steps each day. Do more household tasks or yard work. You may not be able to meet the advised amount of physical activity. But doing some moderate- or vigorous-intensity aerobic activity can help reduce your risk for heart disease. Your healthcare provider can help you figure out what is best for you.  Choose 1 or more activities you enjoy.  Walking is one of the easiest things you can do. You can also try swimming, riding a bike, dancing, or taking an exercise class.    Call 911  Call 911 right away if any of these occur:   Chest pain that doesn't go away quickly with rest  New burning, tightness, pressure, or heaviness in your chest, neck, shoulders, back, or arms  Abnormal or severe shortness of breath  A very fast or irregular heartbeat (palpitations)  Fainting  When to call your healthcare provider  Call your healthcare provider if you have any of these:   Dizziness or lightheadedness  Mild shortness of breath or chest pain  Increased or new joint or muscle pain    Ruben last reviewed this educational content on 7/1/2022 2000-2022 [employer]. All rights reserved. This information is not intended as a substitute for professional medical care. Always follow your healthcare professional's instructions.

## 2023-04-24 NOTE — PROGRESS NOTES
"    She is at risk for lack of exercise and has been provided with information to increase physical activity for the benefit of her well-being.        Answers for HPI/ROS submitted by the patient on 4/24/2023  If you checked off any problems, how difficult have these problems made it for you to do your work, take care of things at home, or get along with other people?: Not difficult at all  PHQ9 TOTAL SCORE: 0  MALLORIE 7 TOTAL SCORE: 0  In general, how would you rate your overall physical health?: good  Frequency of exercise:: None  Do you usually eat at least 4 servings of fruit and vegetables a day, include whole grains & fiber, and avoid regularly eating high fat or \"junk\" foods? : Yes  Taking medications regularly:: Yes  Medication side effects:: None  Activities of Daily Living: no assistance needed  Home safety: no safety concerns identified  Hearing Impairment:: no hearing concerns  In the past 6 months, have you been bothered by leaking of urine?: No  abdominal pain: No  Blood in stool: No  Blood in urine: No  chest pain: No  chills: No  congestion: No  constipation: No  cough: No  diarrhea: No  dizziness: No  ear pain: No  eye pain: No  nervous/anxious: No  fever: No  frequency: No  genital sores: No  headaches: No  hearing loss: No  heartburn: Yes  arthralgias: No  joint swelling: No  peripheral edema: No  mood changes: No  myalgias: No  nausea: No  dysuria: No  palpitations: No  Skin sensation changes: No  sore throat: No  urgency: No  rash: No  shortness of breath: No  visual disturbance: No  weakness: No  pelvic pain: No  vaginal bleeding: No  vaginal discharge: No  tenderness: No  breast mass: No  breast discharge: No  In general, how would you rate your overall mental or emotional health?: good  Additional concerns today:: Yes      "

## 2023-05-30 DIAGNOSIS — I10 BENIGN ESSENTIAL HYPERTENSION: ICD-10-CM

## 2023-05-31 RX ORDER — AMLODIPINE BESYLATE 10 MG/1
10 TABLET ORAL EVERY EVENING
Qty: 90 TABLET | Refills: 3 | OUTPATIENT
Start: 2023-05-31

## 2023-05-31 NOTE — TELEPHONE ENCOUNTER
Pharmacy requested duplicate. Medication refused.     Script sent to the pharmacy last month for 90 tablets with 3 additional refills on file.     Za Hurd RN

## 2023-06-09 DIAGNOSIS — E55.9 VITAMIN D DEFICIENCY: ICD-10-CM

## 2023-06-09 RX ORDER — CHOLECALCIFEROL (VITAMIN D3) 50 MCG
50 TABLET ORAL DAILY
Qty: 90 TABLET | Refills: 1 | Status: SHIPPED | OUTPATIENT
Start: 2023-06-09 | End: 2024-01-11

## 2024-01-11 DIAGNOSIS — E55.9 VITAMIN D DEFICIENCY: ICD-10-CM

## 2024-01-11 RX ORDER — CHOLECALCIFEROL (VITAMIN D3) 50 MCG
1 TABLET ORAL DAILY
Qty: 90 TABLET | Refills: 0 | Status: SHIPPED | OUTPATIENT
Start: 2024-01-11 | End: 2024-03-04

## 2024-01-11 NOTE — TELEPHONE ENCOUNTER
Prescription approved per [facility] Refill Protocol.  Za Hurd, RN  [facility] and Home Triage Nurse

## 2024-03-04 DIAGNOSIS — E55.9 VITAMIN D DEFICIENCY: ICD-10-CM

## 2024-03-05 RX ORDER — CHOLECALCIFEROL (VITAMIN D3) 50 MCG
1 TABLET ORAL DAILY
Qty: 90 TABLET | Refills: 1 | Status: SHIPPED | OUTPATIENT
Start: 2024-03-05

## 2024-04-25 ENCOUNTER — OFFICE VISIT (OUTPATIENT)
Dept: INTERNAL MEDICINE | Facility: CLINIC | Age: 74
End: 2024-04-25
Payer: COMMERCIAL

## 2024-04-25 VITALS
OXYGEN SATURATION: 96 % | HEIGHT: 61 IN | TEMPERATURE: 97.5 F | HEART RATE: 65 BPM | DIASTOLIC BLOOD PRESSURE: 70 MMHG | BODY MASS INDEX: 31.74 KG/M2 | SYSTOLIC BLOOD PRESSURE: 150 MMHG | WEIGHT: 168.1 LBS

## 2024-04-25 DIAGNOSIS — E55.9 VITAMIN D DEFICIENCY: ICD-10-CM

## 2024-04-25 DIAGNOSIS — F41.9 ANXIETY: Chronic | ICD-10-CM

## 2024-04-25 DIAGNOSIS — F41.0 PANIC ATTACK: ICD-10-CM

## 2024-04-25 DIAGNOSIS — E78.00 PURE HYPERCHOLESTEROLEMIA: ICD-10-CM

## 2024-04-25 DIAGNOSIS — I10 BENIGN ESSENTIAL HYPERTENSION: Chronic | ICD-10-CM

## 2024-04-25 DIAGNOSIS — K21.9 GASTROESOPHAGEAL REFLUX DISEASE WITHOUT ESOPHAGITIS: ICD-10-CM

## 2024-04-25 DIAGNOSIS — F32.5 MAJOR DEPRESSIVE DISORDER WITH SINGLE EPISODE, IN FULL REMISSION (H): ICD-10-CM

## 2024-04-25 DIAGNOSIS — B36.9 FUNGAL RASH OF TORSO: ICD-10-CM

## 2024-04-25 DIAGNOSIS — L20.82 FLEXURAL ECZEMA: ICD-10-CM

## 2024-04-25 DIAGNOSIS — E78.5 HYPERLIPIDEMIA WITH TARGET LDL LESS THAN 100: ICD-10-CM

## 2024-04-25 DIAGNOSIS — Z00.00 ENCOUNTER FOR MEDICARE ANNUAL WELLNESS EXAM: Primary | ICD-10-CM

## 2024-04-25 PROCEDURE — 80061 LIPID PANEL: CPT | Performed by: INTERNAL MEDICINE

## 2024-04-25 PROCEDURE — 82306 VITAMIN D 25 HYDROXY: CPT | Performed by: INTERNAL MEDICINE

## 2024-04-25 PROCEDURE — G0439 PPPS, SUBSEQ VISIT: HCPCS | Performed by: INTERNAL MEDICINE

## 2024-04-25 PROCEDURE — 80053 COMPREHEN METABOLIC PANEL: CPT | Performed by: INTERNAL MEDICINE

## 2024-04-25 PROCEDURE — 36415 COLL VENOUS BLD VENIPUNCTURE: CPT | Performed by: INTERNAL MEDICINE

## 2024-04-25 PROCEDURE — 99214 OFFICE O/P EST MOD 30 MIN: CPT | Mod: 25 | Performed by: INTERNAL MEDICINE

## 2024-04-25 RX ORDER — TRIAMCINOLONE ACETONIDE 1 MG/G
CREAM TOPICAL 2 TIMES DAILY
Qty: 80 G | Refills: 1 | Status: SHIPPED | OUTPATIENT
Start: 2024-04-25 | End: 2024-07-25

## 2024-04-25 RX ORDER — AMLODIPINE BESYLATE 10 MG/1
10 TABLET ORAL EVERY EVENING
Qty: 90 TABLET | Refills: 3 | Status: SHIPPED | OUTPATIENT
Start: 2024-04-25

## 2024-04-25 RX ORDER — KETOCONAZOLE 20 MG/G
CREAM TOPICAL 2 TIMES DAILY
Qty: 60 G | Refills: 0 | Status: SHIPPED | OUTPATIENT
Start: 2024-04-25

## 2024-04-25 RX ORDER — SERTRALINE HYDROCHLORIDE 100 MG/1
100 TABLET, FILM COATED ORAL DAILY
Qty: 90 TABLET | Refills: 3 | Status: SHIPPED | OUTPATIENT
Start: 2024-04-25

## 2024-04-25 RX ORDER — LOSARTAN POTASSIUM 100 MG/1
100 TABLET ORAL DAILY
Qty: 90 TABLET | Refills: 0 | Status: SHIPPED | OUTPATIENT
Start: 2024-04-25 | End: 2024-07-26

## 2024-04-25 RX ORDER — ATORVASTATIN CALCIUM 40 MG/1
40 TABLET, FILM COATED ORAL EVERY MORNING
Qty: 90 TABLET | Refills: 3 | Status: SHIPPED | OUTPATIENT
Start: 2024-04-25

## 2024-04-25 SDOH — HEALTH STABILITY: PHYSICAL HEALTH: ON AVERAGE, HOW MANY MINUTES DO YOU ENGAGE IN EXERCISE AT THIS LEVEL?: 80 MIN

## 2024-04-25 SDOH — HEALTH STABILITY: PHYSICAL HEALTH: ON AVERAGE, HOW MANY DAYS PER WEEK DO YOU ENGAGE IN MODERATE TO STRENUOUS EXERCISE (LIKE A BRISK WALK)?: 2 DAYS

## 2024-04-25 ASSESSMENT — ANXIETY QUESTIONNAIRES
5. BEING SO RESTLESS THAT IT IS HARD TO SIT STILL: NOT AT ALL
GAD7 TOTAL SCORE: 1
1. FEELING NERVOUS, ANXIOUS, OR ON EDGE: NOT AT ALL
GAD7 TOTAL SCORE: 1
7. FEELING AFRAID AS IF SOMETHING AWFUL MIGHT HAPPEN: NOT AT ALL
2. NOT BEING ABLE TO STOP OR CONTROL WORRYING: SEVERAL DAYS
3. WORRYING TOO MUCH ABOUT DIFFERENT THINGS: NOT AT ALL
6. BECOMING EASILY ANNOYED OR IRRITABLE: NOT AT ALL
GAD7 TOTAL SCORE: 1
8. IF YOU CHECKED OFF ANY PROBLEMS, HOW DIFFICULT HAVE THESE MADE IT FOR YOU TO DO YOUR WORK, TAKE CARE OF THINGS AT HOME, OR GET ALONG WITH OTHER PEOPLE?: NOT DIFFICULT AT ALL
IF YOU CHECKED OFF ANY PROBLEMS ON THIS QUESTIONNAIRE, HOW DIFFICULT HAVE THESE PROBLEMS MADE IT FOR YOU TO DO YOUR WORK, TAKE CARE OF THINGS AT HOME, OR GET ALONG WITH OTHER PEOPLE: NOT DIFFICULT AT ALL
4. TROUBLE RELAXING: NOT AT ALL
7. FEELING AFRAID AS IF SOMETHING AWFUL MIGHT HAPPEN: NOT AT ALL

## 2024-04-25 ASSESSMENT — PATIENT HEALTH QUESTIONNAIRE - PHQ9
SUM OF ALL RESPONSES TO PHQ QUESTIONS 1-9: 0
10. IF YOU CHECKED OFF ANY PROBLEMS, HOW DIFFICULT HAVE THESE PROBLEMS MADE IT FOR YOU TO DO YOUR WORK, TAKE CARE OF THINGS AT HOME, OR GET ALONG WITH OTHER PEOPLE: NOT DIFFICULT AT ALL
SUM OF ALL RESPONSES TO PHQ QUESTIONS 1-9: 0

## 2024-04-25 ASSESSMENT — SOCIAL DETERMINANTS OF HEALTH (SDOH): HOW OFTEN DO YOU GET TOGETHER WITH FRIENDS OR RELATIVES?: PATIENT DECLINED

## 2024-04-25 NOTE — PROGRESS NOTES
Preventive Care Visit  [facility]  Gabe Salgado MD, Internal Medicine  Apr 25, 2024  {Provider  Link to SmartSet :115579}    {PROVIDER CHARTING PREFERENCE:400949}    Rossy Tariq is a 73 year old, presenting for the following:  Wellness Visit        Health Care Directive  Patient does not have a Health Care Directive or Living Will: Discussed advance care planning with patient; however, patient declined at this time.    HPI  ***  {MA/LPN/RN Pre-Provider Visit Orders- hCG/UA/Strep (Optional):422350}  {SUPERLIST (Optional):790540}  {additonal problems for provider to add (Optional):765948}      4/24/2023   General Health   How would you rate your overall physical health? Good         4/24/2023   Nutrition   At least 4 servings of fruits and vegetables/day Yes         4/24/2023   Exercise   Frequency of exercise: None            4/24/2023   Activities of Daily Living- Home Safety   Needs help with the following daily activites NO assistance is needed   Safety concerns in the home None of the above         6/16/2018   Dental   Dentist two times every year? Yes         4/24/2023   Hearing Screening   Hearing concerns? No concerns            No data to display                   { Rooming Staff Patient needs a PHQ as part of the AWV.  Use this link to complete and then refresh the note to pull results Link to PHQ9 Assessment :388149}  {USE TO PULL IN PHQ RESULTS FOR TODAY:092731}        4/24/2023   Substance Use   Alcohol more than 3/day or more than 7/wk No     Social History     Tobacco Use    Smoking status: Never    Smokeless tobacco: Never   Substance Use Topics    Alcohol use: No     Alcohol/week: 0.0 standard drinks of alcohol    Drug use: No     {Provider  If there are gaps in the social history shown above, please follow the link to update and then refresh the note Link to Social and Substance History :989035}      7/19/2022   LAST FHS-7 RESULTS   1st degree relative breast or  ovarian cancer No   Any relative bilateral breast cancer No   Any male have breast cancer No   Any ONE woman have BOTH breast AND ovarian cancer No   Any woman with breast cancer before 50yrs No   2 or more relatives with breast AND/OR ovarian cancer No   2 or more relatives with breast AND/OR bowel cancer No     {If any of the questions to the FHS7 are answered yes, consider referral for genetic counseling.    Additional indications for genetic referral include personal history of breast or ovarian cancer, genetic mutation in 1st degree relative which increases risk of breast cancer including BRCA1, BRCA2, TAMMY, PALB 2, TP53, CHEK2, PTEN, CDH1, STK11 (per ACS) and/or 1st degree relative with history of pancreatic or high-risk prostate cancer (per NCCN):091427}   {Mammogram Decision Support (Optional):449998}    ASCVD Risk   The ASCVD Risk score (Jaylon RASHEED, et al., 2019) failed to calculate for the following reasons:    The systolic blood pressure is missing    {Link to Fracture Risk Assessment Tool (Optional):303445}    {Provider  Use the storyboard to review patient history, after sections have been marked as reviewed, refresh note to capture documentation:006008}  {Provider   REQUIRED AWV use this link to review and update sexual activity history  after section has been marked as reviewed, refresh note to capture documentation:882236}  Reviewed and updated as needed this visit by Provider                    {HISTORY OPTIONS (Optional):761823}  Current providers sharing in care for this patient include:  Patient Care Team:  System, Provider Not In as PCP - General (Clinic)  Sara Estrada MD (Family Practice)  Nixon Schneider MD as Assigned PCP    The following health maintenance items are reviewed in Epic and correct as of today:  Health Maintenance   Topic Date Due    DEPRESSION ACTION PLAN  Never done    ZOSTER IMMUNIZATION (1 of 2) Never done    RSV VACCINE (Pregnancy & 60+) (1  "- 1-dose 60+ series) Never done    INFLUENZA VACCINE (1) Never done    COVID-19 Vaccine (4 - 2023-24 season) 09/01/2023    LIPID  04/24/2024    ANNUAL REVIEW OF HM ORDERS  04/24/2024    FALL RISK ASSESSMENT  04/24/2024    MEDICARE ANNUAL WELLNESS VISIT  04/24/2024    MALLORIE ASSESSMENT  04/24/2024    PHQ-9  04/24/2024    DTAP/TDAP/TD IMMUNIZATION (2 - Td or Tdap) 10/30/2025    GLUCOSE  04/24/2026    ADVANCE CARE PLANNING  04/25/2028    COLORECTAL CANCER SCREENING  06/16/2032    DEXA  06/23/2037    HEPATITIS C SCREENING  Completed    Pneumococcal Vaccine: 65+ Years  Completed    IPV IMMUNIZATION  Aged Out    HPV IMMUNIZATION  Aged Out    MENINGITIS IMMUNIZATION  Aged Out    RSV MONOCLONAL ANTIBODY  Aged Out    MAMMO SCREENING  Discontinued       {ROS Picklists (Optional):762786}     Objective    Exam  LMP  (LMP Unknown)    Estimated body mass index is 31.08 kg/m  as calculated from the following:    Height as of 4/24/23: 1.549 m (5' 1\").    Weight as of 4/24/23: 74.6 kg (164 lb 8 oz).    Physical Exam  {Exam Choices (Optional):956163}  {Provider  The Mini-Cog is incomplete, use link to complete and refresh note Link to Mini-Cog :886641}       No data to display              {A Mini-Cog total score of 0-2 suggests the possibility of dementia, score of 3-5 suggests no dementia:477581}         Signed Electronically by: Gabe Salgado MD  {Email feedback regarding this note to [email or web address]   :032318}  "

## 2024-04-25 NOTE — PROGRESS NOTES
"Preventive Care Visit  [facility]  Gabe Salgado MD, Internal Medicine  Apr 25, 2024      Assessment & Plan     Encounter for Medicare annual wellness exam        Pure hypercholesterolemia  Hyperlipidemia with target LDL less than 100  Lipid panel ordered  Continue Lipitor      Benign essential hypertension  Continue current medications amlodipine and losartan  CMP ordered    Flexural eczema  Trial of Kenalog    Fungal rash of torso  Trial of ketoconazole    Vitamin D deficiency  Check vitamin D level    Anxiety  Major depressive disorder with single episode, in full remission (H24)  Panic attack  Continue Zoloft, refills provided      Gastroesophageal reflux disease without esophagitis  Worsening symptoms  Increase Nexium to twice a day  Referral placed for endoscopy          Patient has been advised of split billing requirements and indicates understanding: Yes          BMI  Estimated body mass index is 31.76 kg/m  as calculated from the following:    Height as of this encounter: 1.549 m (5' 1\").    Weight as of this encounter: 76.2 kg (168 lb 1.6 oz).   Weight management plan: Discussed healthy diet and exercise guidelines    Counseling  Appropriate preventive services were discussed with this patient, including applicable screening as appropriate for fall prevention, nutrition, physical activity, Tobacco-use cessation, weight loss and cognition.  Checklist reviewing preventive services available has been given to the patient.  Reviewed patient's diet, addressing concerns and/or questions.   She is at risk for lack of exercise and has been provided with information to increase physical activity for the benefit of her well-being.   Discussed possible causes of fatigue.     Follow-up in 3 months.      Rossy Tariq is a 73 year old, presenting for the following:  Wellness Visit      Health Care Directive  Patient does not have a Health Care Directive or Living Will: Discussed " advance care planning with patient; however, patient declined at this time.    HPI      History of acid reflux symptoms for some time but symptoms have been worsening for the last 7 months.  Has been taking Nexium once a day for 1 year.  Complaining of sour taste in the morning and nighttime cough and heartburn.    Has a chronic cough since 6 years after having suffered influenza.  Takes Zyrtec daily.    Has hypertension and has been on lisinopril for at least 9 years.  Did not take medication today    Complaining of some rashes.    There is a rash in the center of her chest in the midline which is itching.  There is also rash over her right armpit.  These have been going on for about 6 months        4/25/2024   General Health   How would you rate your overall physical health? Good   Feel stress (tense, anxious, or unable to sleep) Not at all         4/25/2024   Nutrition   Diet: Regular (no restrictions)         4/25/2024   Exercise   Days per week of moderate/strenous exercise 2 days   Average minutes spent exercising at this level 80 min   (!) EXERCISE CONCERN      4/25/2024   Social Factors   Frequency of gathering with friends or relatives Patient declined   Worry food won't last until get money to buy more Patient declined   Food not last or not have enough money for food? Patient declined   Do you have housing?  Patient declined   Are you worried about losing your housing? Patient declined   Lack of transportation? Patient declined   Unable to get utilities (heat,electricity)? Patient declined         4/25/2024   Fall Risk   Fallen 2 or more times in the past year? No   Trouble with walking or balance? No          4/25/2024   Activities of Daily Living- Home Safety   Needs help with the following daily activites None of the above   Safety concerns in the home None of the above         4/25/2024   Dental   Dentist two times every year? Yes         4/25/2024   Hearing Screening   Hearing concerns? None of the  above         4/25/2024   Driving Risk Screening   Patient/family members have concerns about driving No         4/25/2024   General Alertness/Fatigue Screening   Have you been more tired than usual lately? (!) YES         4/25/2024   Urinary Incontinence Screening   Bothered by leaking urine in past 6 months No         4/25/2024   TB Screening   Were you born outside of the [place]? Yes       Today's PHQ-9 Score:       4/25/2024     8:23 AM   PHQ-9 SCORE   PHQ-9 Total Score MyChart 0   PHQ-9 Total Score 0         4/25/2024   Substance Use   Alcohol more than 3/day or more than 7/wk Not Applicable   Do you have a current opioid prescription? No   How severe/bad is pain from 1 to 10? 0/10 (No Pain)   Do you use any other substances recreationally? No     Social History     Tobacco Use    Smoking status: Never    Smokeless tobacco: Never   Substance Use Topics    Alcohol use: No     Alcohol/week: 0.0 standard drinks of alcohol    Drug use: No           7/19/2022   LAST FHS-7 RESULTS   1st degree relative breast or ovarian cancer No   Any relative bilateral breast cancer No   Any male have breast cancer No   Any ONE woman have BOTH breast AND ovarian cancer No   Any woman with breast cancer before 50yrs No   2 or more relatives with breast AND/OR ovarian cancer No   2 or more relatives with breast AND/OR bowel cancer No        Mammogram Screening - After age 74- determine frequency with patient based on health status, life expectancy and patient goals    ASCVD Risk   The 10-year ASCVD risk score (Jaylon RASHEED, et al., 2019) is: 26.8%    Values used to calculate the score:      Age: 73 years      Sex: Female      Is Non- : No      Diabetic: No      Tobacco smoker: No      Systolic Blood Pressure: 150 mmHg      Is BP treated: Yes      HDL Cholesterol: 40 mg/dL      Total Cholesterol: 137 mg/dL            Reviewed and updated as needed this visit by Provider   Tobacco  Allergies  Meds  Problems  " Med Hx  Surg Hx  Fam Hx            Past Medical History:   Diagnosis Date    Anxiety     Hyperlipidaemia LDL goal < 100     Hypertension      Current providers sharing in care for this patient include:  Patient Care Team:  System, Provider Not In as PCP - General (Clinic)  Sara Estrada MD ([facility])  Nixon Schneider MD as Assigned PCP    The following health maintenance items are reviewed in Epic and correct as of today:  Health Maintenance   Topic Date Due    DEPRESSION ACTION PLAN  Never done    ZOSTER IMMUNIZATION (1 of 2) Never done    RSV VACCINE (Pregnancy & 60+) (1 - 1-dose 60+ series) Never done    INFLUENZA VACCINE (1) Never done    COVID-19 Vaccine (4 - 2023-24 season) 09/01/2023    MEDICARE ANNUAL WELLNESS VISIT  04/25/2025    LIPID  04/25/2025    MALLORIE ASSESSMENT  04/25/2025    ANNUAL REVIEW OF HM ORDERS  04/25/2025    FALL RISK ASSESSMENT  04/25/2025    PHQ-9  04/25/2025    DTAP/TDAP/TD IMMUNIZATION (2 - Td or Tdap) 10/30/2025    GLUCOSE  04/25/2027    ADVANCE CARE PLANNING  04/25/2028    COLORECTAL CANCER SCREENING  06/16/2032    DEXA  06/23/2037    HEPATITIS C SCREENING  Completed    Pneumococcal Vaccine: 65+ Years  Completed    IPV IMMUNIZATION  Aged Out    HPV IMMUNIZATION  Aged Out    MENINGITIS IMMUNIZATION  Aged Out    RSV MONOCLONAL ANTIBODY  Aged Out    MAMMO SCREENING  Discontinued         Review of Systems  Constitutional, HEENT, cardiovascular, pulmonary, GI, , musculoskeletal, neuro, skin, endocrine and psych systems are negative, except as otherwise noted.     Objective    Exam  BP (!) 150/70   Pulse 65   Temp 97.5  F (36.4  C) (Temporal)   Ht 1.549 m (5' 1\")   Wt 76.2 kg (168 lb 1.6 oz)   LMP  (LMP Unknown)   SpO2 96%   BMI 31.76 kg/m     Estimated body mass index is 31.76 kg/m  as calculated from the following:    Height as of this encounter: 1.549 m (5' 1\").    Weight as of this encounter: 76.2 kg (168 lb 1.6 oz).    Physical Exam  GENERAL: " alert and no distress  EYES: Eyes grossly normal to inspection, PERRL and conjunctivae and sclerae normal  HENT: ear canals and TM's normal, nose and mouth without ulcers or lesions  NECK: no adenopathy, no asymmetry, masses, or scars  RESP: lungs clear to auscultation - no rales, rhonchi or wheezes  CV: regular rate and rhythm, normal S1 S2, no S3 or S4, no murmur, click or rub, no peripheral edema  ABDOMEN: soft, nontender, no hepatosplenomegaly, no masses and bowel sounds normal  MS: no gross musculoskeletal defects noted, no edema  SKIN: Erythematous patchy rash is noted over the chest without any scaling in the middle  Similar rashes noted in the right armpit  NEURO: Normal strength and tone, mentation intact and speech normal  PSYCH: mentation appears normal, affect normal/bright         4/25/2024   Mini Cog   Clock Draw Score 0 Abnormal   3 Item Recall 3 objects recalled   Mini Cog Total Score 3              Signed Electronically by: Gabe Salgado MD

## 2024-04-25 NOTE — PATIENT INSTRUCTIONS
STOP Lisinopril  Start Losartan 100 mg daily for HTN  Increase nexium to two times a day  Try ketoconazole cream and triamcinolone cream for the rash    Preventive Care Advice   This is general advice given by our system to help you stay healthy. However, your care team may have specific advice just for you. Please talk to your care team about your preventive care needs.  Nutrition  Eat 5 or more servings of fruits and vegetables each day.  Try wheat bread, brown rice and whole grain pasta (instead of white bread, rice, and pasta).  Get enough calcium and vitamin D. Check the label on foods and aim for 100% of the RDA (recommended daily allowance).  Lifestyle  Exercise at least 150 minutes each week   (30 minutes a day, 5 days a week).  Do muscle strengthening activities 2 days a week. These help control your weight and prevent disease.  No smoking.  Wear sunscreen to prevent skin cancer.  Have a dental exam and cleaning every 6 months.  Yearly exams  See your health care team every year to talk about:  Any changes in your health.  Any medicines your care team has prescribed.  Preventive care, family planning, and ways to prevent chronic diseases.  Shots (vaccines)   HPV shots (up to age 26), if you've never had them before.  Hepatitis B shots (up to age 59), if you've never had them before.  COVID-19 shot: Get this shot when it's due.  Flu shot: Get a flu shot every year.  Tetanus shot: Get a tetanus shot every 10 years.  Pneumococcal, hepatitis A, and RSV shots: Ask your care team if you need these based on your risk.  Shingles shot (for age 50 and up).  General health tests  Diabetes screening:  Starting at age 35, Get screened for diabetes at least every 3 years.  If you are younger than age 35, ask your care team if you should be screened for diabetes.  Cholesterol test: At age 39, start having a cholesterol test every 5 years, or more often if advised.  Bone density scan (DEXA): At age 50, ask your care team  if you should have this scan for osteoporosis (brittle bones).  Hepatitis C: Get tested at least once in your life.  STIs (sexually transmitted infections)  Before age 24: Ask your care team if you should be screened for STIs.  After age 24: Get screened for STIs if you're at risk. You are at risk for STIs (including HIV) if:  You are sexually active with more than one person.  You don't use condoms every time.  You or a partner was diagnosed with a sexually transmitted infection.  If you are at risk for HIV, ask about PrEP medicine to prevent HIV.  Get tested for HIV at least once in your life, whether you are at risk for HIV or not.  Cancer screening tests  Cervical cancer screening: If you have a cervix, begin getting regular cervical cancer screening tests at age 21. Most people who have regular screenings with normal results can stop after age 65. Talk about this with your provider.  Breast cancer scan (mammogram): If you've ever had breasts, begin having regular mammograms starting at age 40. This is a scan to check for breast cancer.  Colon cancer screening: It is important to start screening for colon cancer at age 45.  Have a colonoscopy test every 10 years (or more often if you're at risk) Or, ask your provider about stool tests like a FIT test every year or Cologuard test every 3 years.  To learn more about your testing options, visit: https://www.[employer]/295588.pdf.  For help making a decision, visit: https://bit.ly/vd28136.  Prostate cancer screening test: If you have a prostate and are age 55 to 69, ask your provider if you would benefit from a yearly prostate cancer screening test.  Lung cancer screening: If you are a current or former smoker age 50 to 80, ask your care team if ongoing lung cancer screenings are right for you.  For informational purposes only. Not to replace the advice of your health care provider. Copyright   2023 [employer]. All rights reserved. Clinically reviewed  by the [facility]itions Program. [employer] 148872 - REV 01/24.    Learning About Sleeping Well  What does sleeping well mean?     Sleeping well means getting enough sleep to feel good and stay healthy. How much sleep is enough varies among people.  The number of hours you sleep and how you feel when you wake up are both important. If you do not feel refreshed, you probably need more sleep. Another sign of not getting enough sleep is feeling tired during the day.  Experts recommend that adults get at least 7 or more hours of sleep per day. Children and older adults need more sleep.  Why is getting enough sleep important?  Getting enough quality sleep is a basic part of good health. When your sleep suffers, your physical health, mood, and your thoughts can suffer too. You may find yourself feeling more grumpy or stressed. Not getting enough sleep also can lead to serious problems, including injury, accidents, anxiety, and depression.  What might cause poor sleeping?  Many things can cause sleep problems, including:  Changes to your sleep schedule.  Stress. Stress can be caused by fear about a single event, such as giving a speech. Or you may have ongoing stress, such as worry about work or school.  Depression, anxiety, and other mental or emotional conditions.  Changes in your sleep habits or surroundings. This includes changes that happen where you sleep, such as noise, light, or sleeping in a different bed. It also includes changes in your sleep pattern, such as having jet lag or working a late shift.  Health problems, such as pain, breathing problems, and restless legs syndrome.  Lack of regular exercise.  Using alcohol, nicotine, or caffeine before bed.  How can you help yourself?  Here are some tips that may help you sleep more soundly and wake up feeling more refreshed.  Your sleeping area   Use your bedroom only for sleeping and sex. A bit of light reading may help you fall asleep. But if it  "doesn't, do your reading elsewhere in the house. Try not to use your TV, computer, smartphone, or tablet while you are in bed.  Be sure your bed is big enough to stretch out comfortably, especially if you have a sleep partner.  Keep your bedroom quiet, dark, and cool. Use curtains, blinds, or a sleep mask to block out light. To block out noise, use earplugs, soothing music, or a \"white noise\" machine.  Your evening and bedtime routine   Create a relaxing bedtime routine. You might want to take a warm shower or bath, or listen to soothing music.  Go to bed at the same time every night. And get up at the same time every morning, even if you feel tired.  What to avoid   Limit caffeine (coffee, tea, caffeinated sodas) during the day, and don't have any for at least 6 hours before bedtime.  Avoid drinking alcohol before bedtime. Alcohol can cause you to wake up more often during the night.  Try not to smoke or use tobacco, especially in the evening. Nicotine can keep you awake.  Limit naps during the day, especially close to bedtime.  Avoid lying in bed awake for too long. If you can't fall asleep or if you wake up in the middle of the night and can't get back to sleep within about 20 minutes, get out of bed and go to another room until you feel sleepy.  Avoid taking medicine right before bed that may keep you awake or make you feel hyper or energized. Your doctor can tell you if your medicine may do this and if you can take it earlier in the day.  If you can't sleep   Imagine yourself in a peaceful, pleasant scene. Focus on the details and feelings of being in a place that is relaxing.  Get up and do a quiet or boring activity until you feel sleepy.  Avoid drinking any liquids before going to bed to help prevent waking up often to use the bathroom.  Where can you learn more?  Go to https://www.healthwise.net/patiented  Enter J942 in the search box to learn more about \"Learning About Sleeping Well.\"  Current as of: July " 10, 2023               Content Version: 14.0    4498-1827 [employer].   Care instructions adapted under license by your healthcare professional. If you have questions about a medical condition or this instruction, always ask your healthcare professional. [employer] disclaims any warranty or liability for your use of this information.

## 2024-04-26 LAB
ALBUMIN SERPL BCG-MCNC: 4.4 G/DL (ref 3.5–5.2)
ALP SERPL-CCNC: 112 U/L (ref 40–150)
ALT SERPL W P-5'-P-CCNC: 11 U/L (ref 0–50)
ANION GAP SERPL CALCULATED.3IONS-SCNC: 10 MMOL/L (ref 7–15)
AST SERPL W P-5'-P-CCNC: 20 U/L (ref 0–45)
BILIRUB SERPL-MCNC: 0.4 MG/DL
BUN SERPL-MCNC: 19.5 MG/DL (ref 8–23)
CALCIUM SERPL-MCNC: 9.1 MG/DL (ref 8.8–10.2)
CHLORIDE SERPL-SCNC: 106 MMOL/L (ref 98–107)
CHOLEST SERPL-MCNC: 137 MG/DL
CREAT SERPL-MCNC: 0.75 MG/DL (ref 0.51–0.95)
DEPRECATED HCO3 PLAS-SCNC: 23 MMOL/L (ref 22–29)
EGFRCR SERPLBLD CKD-EPI 2021: 83 ML/MIN/1.73M2
FASTING STATUS PATIENT QL REPORTED: YES
GLUCOSE SERPL-MCNC: 107 MG/DL (ref 70–99)
HDLC SERPL-MCNC: 40 MG/DL
LDLC SERPL CALC-MCNC: 79 MG/DL
NONHDLC SERPL-MCNC: 97 MG/DL
POTASSIUM SERPL-SCNC: 4.6 MMOL/L (ref 3.4–5.3)
PROT SERPL-MCNC: 7.8 G/DL (ref 6.4–8.3)
SODIUM SERPL-SCNC: 139 MMOL/L (ref 135–145)
TRIGL SERPL-MCNC: 88 MG/DL
VIT D+METAB SERPL-MCNC: 36 NG/ML (ref 20–50)

## 2024-04-30 DIAGNOSIS — K21.9 GASTROESOPHAGEAL REFLUX DISEASE WITHOUT ESOPHAGITIS: ICD-10-CM

## 2024-05-07 DIAGNOSIS — I10 BENIGN ESSENTIAL HYPERTENSION: ICD-10-CM

## 2024-05-08 RX ORDER — LISINOPRIL 40 MG/1
40 TABLET ORAL EVERY MORNING
Qty: 90 TABLET | Refills: 3 | OUTPATIENT
Start: 2024-05-08

## 2024-05-22 ENCOUNTER — TELEPHONE (OUTPATIENT)
Dept: GASTROENTEROLOGY | Facility: CLINIC | Age: 74
End: 2024-05-22
Payer: COMMERCIAL

## 2024-05-22 NOTE — TELEPHONE ENCOUNTER
"Endoscopy Scheduling Screen    Have you had a positive Covid test in the last 14 days?  No    What is your communication preference for Instructions and/or Bowel Prep?   MyChart    What insurance is in the chart?  Other:  [facility]    Ordering/Referring Provider:     JAIME DEVLIN      (If ordering provider performs procedure, schedule with ordering provider unless otherwise instructed. )    BMI: Estimated body mass index is 31.76 kg/m  as calculated from the following:    Height as of 4/25/24: 1.549 m (5' 1\").    Weight as of 4/25/24: 76.2 kg (168 lb 1.6 oz).     Sedation Ordered  moderate sedation.   If patient BMI > 50 do not schedule in [facility].    If patient BMI > 45 do not schedule at [facility].    Are you taking methadone or Suboxone?  No    Have you had difficulties, pain, or discomfort during past endoscopy procedures?  No    Are you taking any prescription medications for pain 3 or more times per week?   NO, No RN review required.    Do you have a history of malignant hyperthermia?  No    (Females) Are you currently pregnant?        Have you been diagnosed or told you have pulmonary hypertension?   No    Do you have an LVAD?  No    Have you been told you have moderate to severe sleep apnea?  No    Have you been told you have COPD, asthma, or any other lung disease?  No    Do you have any heart conditions?  No     Have you ever had or are you waiting for an organ transplant?  No. Continue scheduling, no site restrictions.    Have you had a stroke or transient ischemic attack (TIA aka \"mini stroke\" in the last 6 months?   No    Have you been diagnosed with or been told you have cirrhosis of the liver?   No    Are you currently on dialysis?   No    Do you need assistance transferring?   No    BMI: Estimated body mass index is 31.76 kg/m  as calculated from the following:    Height as of 4/25/24: 1.549 m (5' 1\").    Weight as of 4/25/24: 76.2 kg (168 lb 1.6 oz).     Is patients BMI > 40 and scheduling location " [facility]?  No    Do you take an injectable medication for weight loss or diabetes (excluding insulin)?  No    Do you take the medication Naltrexone?  No    Do you take blood thinners?  No       Prep   Are you currently on dialysis or do you have chronic kidney disease?  No    Do you have a diagnosis of diabetes?  No    Do you have a diagnosis of cystic fibrosis (CF)?  No    On a regular basis do you go 3 -5 days between bowel movements?  No    BMI > 40?  No    Preferred Pharmacy:    [facility] [id number] [facility] [employer] - [facility] [address]   [address] [facility] [address]  Phone: [phone] Fax: [phone]      Final Scheduling Details     Procedure scheduled  Upper endoscopy (EGD)    Surgeon:  XAVI     Date of procedure:  9/3     Pre-OP / PAC:   No - Not required for this site.    Location  [facility] - Per order.    Sedation   Moderate Sedation - Per order.      Patient Reminders:   You will receive a call from a Nurse to review instructions and health history.  This assessment must be completed prior to your procedure.  Failure to complete the Nurse assessment may result in the procedure being cancelled.      On the day of your procedure, please designate an adult(s) who can drive you home stay with you for the next 24 hours. The medicines used in the exam will make you sleepy. You will not be able to drive.      You cannot take public transportation, ride share services, or non-medical taxi service without a responsible caregiver.  Medical transport services are allowed with the requirement that a responsible caregiver will receive you at your destination.  We require that drivers and caregivers are confirmed prior to your procedure.

## 2024-07-24 ENCOUNTER — TELEPHONE (OUTPATIENT)
Dept: GASTROENTEROLOGY | Facility: CLINIC | Age: 74
End: 2024-07-24
Payer: COMMERCIAL

## 2024-07-24 NOTE — TELEPHONE ENCOUNTER
Called patient to offer sooner.     has 08/12 Open with Dr. Nhan JOHANSENU has 07/31, 08/05, 08/14    Left message to call back if interested in rescheduling sooner offering all locations schedule for.

## 2024-07-25 ENCOUNTER — OFFICE VISIT (OUTPATIENT)
Dept: INTERNAL MEDICINE | Facility: CLINIC | Age: 74
End: 2024-07-25
Payer: COMMERCIAL

## 2024-07-25 VITALS
WEIGHT: 166.1 LBS | BODY MASS INDEX: 31.36 KG/M2 | TEMPERATURE: 97.1 F | SYSTOLIC BLOOD PRESSURE: 137 MMHG | DIASTOLIC BLOOD PRESSURE: 75 MMHG | HEART RATE: 66 BPM | OXYGEN SATURATION: 97 % | HEIGHT: 61 IN

## 2024-07-25 DIAGNOSIS — I10 BENIGN ESSENTIAL HYPERTENSION: Primary | Chronic | ICD-10-CM

## 2024-07-25 DIAGNOSIS — F32.5 MAJOR DEPRESSIVE DISORDER WITH SINGLE EPISODE, IN FULL REMISSION (H): ICD-10-CM

## 2024-07-25 DIAGNOSIS — F41.9 ANXIETY: Chronic | ICD-10-CM

## 2024-07-25 DIAGNOSIS — L20.82 FLEXURAL ECZEMA: ICD-10-CM

## 2024-07-25 PROCEDURE — 99214 OFFICE O/P EST MOD 30 MIN: CPT | Performed by: INTERNAL MEDICINE

## 2024-07-25 PROCEDURE — G2211 COMPLEX E/M VISIT ADD ON: HCPCS | Performed by: INTERNAL MEDICINE

## 2024-07-25 RX ORDER — TRIAMCINOLONE ACETONIDE 1 MG/G
CREAM TOPICAL 2 TIMES DAILY
Qty: 80 G | Refills: 1 | Status: SHIPPED | OUTPATIENT
Start: 2024-07-25

## 2024-07-25 NOTE — PROGRESS NOTES
"  Assessment & Plan     Flexural eczema  Continue Triamcinolone    Benign essential hypertension  controlled    Anxiety  Major depressive disorder with single episode, in full remission (H24)  controlled         The longitudinal plan of care for the diagnosis(es)/condition(s) as documented were addressed during this visit. Due to the added complexity in care, I will continue to support Marciano in the subsequent management and with ongoing continuity of care.        Follow up in April 2025 for Wellness exam.    Rossy Tariq is a 74 year old, presenting for the following health issues:  Follow Up and Hypertension  Still has a little rash only when sweating   Some itching on pt upper back behind the neck   HPI       Hypertension Follow-up    Do you check your blood pressure regularly outside of the clinic? Yes   Are you following a low salt diet? Yes  Are your blood pressures ever more than 140 on the top number (systolic) OR more   than 90 on the bottom number (diastolic), for example 140/90? Yes      Triamcinolone is  helping the rashes.  Needs a refill.    GERD symptoms are better, having to buy Nexium OTC, not covered on her plan.    Anxiety is situational, depression is controlled.          Review of Systems  Constitutional, neuro, ENT, endocrine, pulmonary, cardiac, gastrointestinal, genitourinary, musculoskeletal, integument and psychiatric systems are negative, except as otherwise noted.      Objective    /75   Pulse 66   Temp 97.1  F (36.2  C) (Temporal)   Ht 1.549 m (5' 1\")   Wt 75.3 kg (166 lb 1.6 oz)   LMP  (LMP Unknown)   SpO2 97%   BMI 31.38 kg/m    Body mass index is 31.38 kg/m .  Physical Exam   GENERAL: alert and no distress  NECK: no adenopathy, no asymmetry, masses, or scars  RESP: lungs clear to auscultation - no rales, rhonchi or wheezes  CV: regular rate and rhythm, normal S1 S2, no S3 or S4, no murmur, click or rub, no peripheral edema  ABDOMEN: soft, nontender, no " hepatosplenomegaly, no masses and bowel sounds normal  MS: no gross musculoskeletal defects noted, no edema   Dry skin on back, moles on back.          Signed Electronically by: Gabe Salgado MD

## 2024-07-26 DIAGNOSIS — I10 BENIGN ESSENTIAL HYPERTENSION: Chronic | ICD-10-CM

## 2024-07-26 RX ORDER — LOSARTAN POTASSIUM 100 MG/1
100 TABLET ORAL DAILY
Qty: 90 TABLET | Refills: 0 | Status: SHIPPED | OUTPATIENT
Start: 2024-07-26

## 2024-08-24 ENCOUNTER — TELEPHONE (OUTPATIENT)
Dept: GASTROENTEROLOGY | Facility: CLINIC | Age: 74
End: 2024-08-24
Payer: COMMERCIAL

## 2024-08-24 NOTE — TELEPHONE ENCOUNTER
Pre visit planning completed.      Procedure details:    Patient scheduled for Upper endoscopy (EGD) on 9/3/24.     Arrival time: 0945. Procedure time 1030    Facility location: [facility]; [address] [place] [address] [place] [address]. Check in location: [facility].     Sedation type: Conscious sedation     Pre op exam needed? No.    Indication for procedure: GERD      Chart review:     Electronic implanted devices? No    Recent diagnosis of diverticulitis within the last 6 weeks? N/A      Medication review:    Diabetic? No    Anticoagulants? No    Weight loss medication/injectable? No GLP-1 medication per patient's medication list.  RN will verify with pre-assessment call.    NSAIDS? No NSAID medications per patient's medication list.  RN will verify with pre-assessment call.    Other medication HOLDING recommendations:  N/A      Prep for procedure:     Bowel prep recommendation: N/A    Prep instructions sent via georgie Marquez RN  Endoscopy Procedure Pre Assessment RN  [phone] option 4

## 2024-08-26 NOTE — TELEPHONE ENCOUNTER
Pre assessment completed for upcoming procedure.   (Please see previous telephone encounter notes for complete details)      Procedure details:    Arrival time and facility location reviewed.    Pre op exam needed? No.    Designated  policy reviewed. Instructed to have someone stay 6  hours post procedure.       Medication review:    Medications reviewed. Please see supporting documentation below. Holding recommendations discussed (if applicable).       Prep for procedure:     Procedure prep instructions reviewed.        Any additional information needed:  N/A      Patient  verbalized understanding and had no questions or concerns at this time.      Melinda Barahona RN  Endoscopy Procedure Pre Assessment   [phone] option 4

## 2024-09-03 ENCOUNTER — HOSPITAL ENCOUNTER (OUTPATIENT)
Facility: CLINIC | Age: 74
Discharge: HOME | End: 2024-09-03
Attending: INTERNAL MEDICINE | Admitting: INTERNAL MEDICINE
Payer: COMMERCIAL

## 2024-09-03 VITALS
RESPIRATION RATE: 46 BRPM | HEART RATE: 54 BPM | OXYGEN SATURATION: 94 % | DIASTOLIC BLOOD PRESSURE: 60 MMHG | BODY MASS INDEX: 31.37 KG/M2 | WEIGHT: 166 LBS | SYSTOLIC BLOOD PRESSURE: 114 MMHG

## 2024-09-03 LAB — UPPER GI ENDOSCOPY: NORMAL

## 2024-09-03 PROCEDURE — 43235 EGD DIAGNOSTIC BRUSH WASH: CPT | Performed by: INTERNAL MEDICINE

## 2024-09-03 PROCEDURE — 999N000099 HC STATISTIC MODERATE SEDATION < 10 MIN: Performed by: INTERNAL MEDICINE

## 2024-09-03 PROCEDURE — 250N000011 HC RX IP 250 OP 636: Performed by: INTERNAL MEDICINE

## 2024-09-03 PROCEDURE — 250N000009 HC RX 250: Performed by: INTERNAL MEDICINE

## 2024-09-03 RX ORDER — FENTANYL CITRATE 50 UG/ML
INJECTION, SOLUTION INTRAMUSCULAR; INTRAVENOUS PRN
Status: DISCONTINUED | OUTPATIENT
Start: 2024-09-03 | End: 2024-09-03 | Stop reason: HOSPADM

## 2024-09-03 ASSESSMENT — ACTIVITIES OF DAILY LIVING (ADL)
ADLS_ACUITY_SCORE: 35
ADLS_ACUITY_SCORE: 35

## 2024-09-03 NOTE — H&P
"ENDOSCOPY PRE-SEDATION H&P FOR OUTPATIENT PROCEDURES    Marciano Akins  [id number]  1950    Procedure: Endoscopy    Pre-procedure diagnosis: Heartburn    Past medical history:   Past Medical History:   Diagnosis Date    Anxiety     Hyperlipidaemia LDL goal < 100     Hypertension      Patient Active Problem List   Diagnosis    Hyperlipidemia with target LDL less than 100    Pulmonary nodules    Osteopenia    Thyroid nodule    Vitamin D deficiency    Benign essential hypertension    Anxiety    Counseling on health care directive    Pure hypercholesterolemia    Major depressive disorder with single episode, in full remission (H24)       Past surgical history: History reviewed. No pertinent surgical history.    No current facility-administered medications for this encounter.       No Known Allergies        Physical Exam:    Mental status: alert  Heart: Normal  Lung: Normal  Assessment of patient's airway: Normal  Other as pertinent for procedure: None     Lab Results   Component Value Date    WBC 8.1 04/24/2023    WBC 6.8 02/10/2021     Lab Results   Component Value Date    RBC 4.84 04/24/2023    RBC 5.06 02/10/2021     Lab Results   Component Value Date    HGB 14.0 04/24/2023    HGB 14.4 02/10/2021     Lab Results   Component Value Date    HCT 42.5 04/24/2023    HCT 44.8 02/10/2021     Lab Results   Component Value Date    MCV 88 04/24/2023    MCV 89 02/10/2021     Lab Results   Component Value Date    MCH 28.9 04/24/2023    MCH 28.5 02/10/2021     Lab Results   Component Value Date    MCHC 32.9 04/24/2023    MCHC 32.1 02/10/2021     Lab Results   Component Value Date    RDW 13.4 04/24/2023    RDW 13.9 02/10/2021     Lab Results   Component Value Date     04/24/2023     02/10/2021     No results found for: \"INR\"     ASA Score: See Provation note    Mallampati score:  I - Faucial pillars, soft palate, and uvula are visible    Assessment/Plan:     The patient is an appropriate candidate to receive " sedation.    Informed consent was discussed with the patient/family, including the risks, benefits, potential complications and any alternative options associated with sedation.    Patient assessment completed just prior to sedation and while under constant observation by the provider. Condition determined to be adequate for proceeding with sedation.    The specific risks for the procedure were discussed with the patient at the time of informed consent and include but are not limited to perforation which could require surgery, missing significant neoplasm or lesion, hemorrhage and adverse sedative complication.      Kashmir Ang MD

## 2024-09-19 ENCOUNTER — ALLIED HEALTH/NURSE VISIT (OUTPATIENT)
Dept: INTERNAL MEDICINE | Facility: CLINIC | Age: 74
End: 2024-09-19
Payer: COMMERCIAL

## 2024-09-19 VITALS — DIASTOLIC BLOOD PRESSURE: 68 MMHG | SYSTOLIC BLOOD PRESSURE: 130 MMHG

## 2024-09-19 DIAGNOSIS — I10 BENIGN ESSENTIAL HYPERTENSION: Primary | ICD-10-CM

## 2024-09-19 PROCEDURE — 99207 PR NO CHARGE NURSE ONLY: CPT

## 2024-09-19 NOTE — NURSING NOTE
Marciano Akins is a 74 year old patient who comes in today for a Blood Pressure check.  Initial BP:  /68   LMP  (LMP Unknown)      Data Unavailable  Disposition: follow-up as previously indicated by provider

## 2024-10-25 ENCOUNTER — OFFICE VISIT (OUTPATIENT)
Dept: INTERNAL MEDICINE | Facility: CLINIC | Age: 74
End: 2024-10-25
Payer: COMMERCIAL

## 2024-10-25 VITALS
HEART RATE: 64 BPM | WEIGHT: 165.5 LBS | TEMPERATURE: 97.2 F | RESPIRATION RATE: 18 BRPM | BODY MASS INDEX: 31.27 KG/M2 | OXYGEN SATURATION: 96 % | DIASTOLIC BLOOD PRESSURE: 69 MMHG | SYSTOLIC BLOOD PRESSURE: 155 MMHG

## 2024-10-25 DIAGNOSIS — K21.00 GASTROESOPHAGEAL REFLUX DISEASE WITH ESOPHAGITIS WITHOUT HEMORRHAGE: Primary | ICD-10-CM

## 2024-10-25 DIAGNOSIS — Z23 NEED FOR SHINGLES VACCINE: ICD-10-CM

## 2024-10-25 DIAGNOSIS — E78.5 HYPERLIPIDEMIA WITH TARGET LDL LESS THAN 100: ICD-10-CM

## 2024-10-25 DIAGNOSIS — Z29.11 NEED FOR VACCINATION AGAINST RESPIRATORY SYNCYTIAL VIRUS: ICD-10-CM

## 2024-10-25 DIAGNOSIS — I10 BENIGN ESSENTIAL HYPERTENSION: Chronic | ICD-10-CM

## 2024-10-25 PROCEDURE — G2211 COMPLEX E/M VISIT ADD ON: HCPCS | Performed by: INTERNAL MEDICINE

## 2024-10-25 PROCEDURE — 99214 OFFICE O/P EST MOD 30 MIN: CPT | Performed by: INTERNAL MEDICINE

## 2024-10-25 RX ORDER — SUCRALFATE 1 G/1
1 TABLET ORAL 4 TIMES DAILY PRN
Qty: 120 TABLET | Refills: 1 | Status: SHIPPED | OUTPATIENT
Start: 2024-10-25

## 2024-10-25 RX ORDER — OMEPRAZOLE 40 MG/1
40 CAPSULE, DELAYED RELEASE ORAL 2 TIMES DAILY
Qty: 180 CAPSULE | Refills: 1 | Status: SHIPPED | OUTPATIENT
Start: 2024-10-25

## 2024-10-25 ASSESSMENT — ANXIETY QUESTIONNAIRES
5. BEING SO RESTLESS THAT IT IS HARD TO SIT STILL: NOT AT ALL
IF YOU CHECKED OFF ANY PROBLEMS ON THIS QUESTIONNAIRE, HOW DIFFICULT HAVE THESE PROBLEMS MADE IT FOR YOU TO DO YOUR WORK, TAKE CARE OF THINGS AT HOME, OR GET ALONG WITH OTHER PEOPLE: SOMEWHAT DIFFICULT
2. NOT BEING ABLE TO STOP OR CONTROL WORRYING: NOT AT ALL
6. BECOMING EASILY ANNOYED OR IRRITABLE: NOT AT ALL
1. FEELING NERVOUS, ANXIOUS, OR ON EDGE: NOT AT ALL
3. WORRYING TOO MUCH ABOUT DIFFERENT THINGS: SEVERAL DAYS
GAD7 TOTAL SCORE: 3
7. FEELING AFRAID AS IF SOMETHING AWFUL MIGHT HAPPEN: MORE THAN HALF THE DAYS
GAD7 TOTAL SCORE: 3

## 2024-10-25 ASSESSMENT — PATIENT HEALTH QUESTIONNAIRE - PHQ9
SUM OF ALL RESPONSES TO PHQ QUESTIONS 1-9: 5
5. POOR APPETITE OR OVEREATING: NOT AT ALL

## 2024-10-25 ASSESSMENT — PAIN SCALES - GENERAL: PAINLEVEL_OUTOF10: NO PAIN (0)

## 2024-10-25 NOTE — PATIENT INSTRUCTIONS
Stop taking nexium for 2 weeks.  You can take sucralfate and OTC Pepcid meanwhile for the symptoms.    Please complete Stool test for H.Pylori after 2 weeks, then you can resume nexium or omeprazole if that is cheaper.    Please schedule gallbladder Ultrasound.

## 2024-10-25 NOTE — PROGRESS NOTES
"  Assessment & Plan         Gastroesophageal reflux disease with esophagitis without hemorrhage  Worsening symptoms  Will check stool H. pylori antigen  Ultrasound abdomen ordered to evaluate gallbladder  Sucralfate added  Prescription changed to omeprazole to see if that  would be more affordable for her    Benign essential hypertension  Continue current medication, she did not take her medication today    Hyperlipidemia with target LDL less than 100  Stable            BMI  Estimated body mass index is 31.27 kg/m  as calculated from the following:    Height as of 7/25/24: 1.549 m (5' 1\").    Weight as of this encounter: 75.1 kg (165 lb 8 oz).         Follow-up in 2 months.    The longitudinal plan of care for the diagnosis(es)/condition(s) as documented were addressed during this visit. Due to the added complexity in care, I will continue to support Marciano in the subsequent management and with ongoing continuity of care.      Rossy Tariq is a 74 year old, presenting for the following health issues:  Medication Follow-up (Blood pressure, lipids, and mood disorder. )        10/25/2024     8:07 AM   Additional Questions   Roomed by Radha IBARRA   Accompanied by Spouse- Enver     History of Present Illness       Reason for visit:  Follow up      She is complaining of increasing acid reflux symptoms for the last 6 months .  She did have an endoscopy which showed mild esophagitis, H. pylori testing was not done,     She is also having occasional vomiting especially after eating.    She is taking OTC Nexium as that is cheaper for her but has been out.         Hyperlipidemia Follow-Up    Are you regularly taking any medication or supplement to lower your cholesterol?   Yes- Atorvastatin  Are you having muscle aches or other side effects that you think could be caused by your cholesterol lowering medication?  No    Hypertension Follow-up    Do you check your blood pressure regularly outside of the clinic? Yes   Are " you following a low salt diet? Yes  Are your blood pressures ever more than 140 on the top number (systolic) OR more   than 90 on the bottom number (diastolic), for example 140/90? Yes    Depression and Anxiety   How are you doing with your depression since your last visit? No change  How are you doing with your anxiety since your last visit?  No change  Are you having other symptoms that might be associated with depression or anxiety? No  Have you had a significant life event? No   Do you have any concerns with your use of alcohol or other drugs? No    Social History     Tobacco Use    Smoking status: Never    Smokeless tobacco: Never   Vaping Use    Vaping status: Never Used   Substance Use Topics    Alcohol use: No     Alcohol/week: 0.0 standard drinks of alcohol    Drug use: No         4/24/2023     2:01 PM 4/25/2024     8:23 AM 10/25/2024     8:11 AM   PHQ   PHQ-9 Total Score 0 0 5   Q9: Thoughts of better off dead/self-harm past 2 weeks Not at all  Not at all  Not at all       Patient-reported         4/24/2023     2:02 PM 4/25/2024     8:24 AM 10/25/2024     8:11 AM   MALLORIE-7 SCORE   Total Score 0 (minimal anxiety) 1 (minimal anxiety)    Total Score 0 1 3         10/25/2024     8:11 AM   Last PHQ-9   1.  Little interest or pleasure in doing things 0   2.  Feeling down, depressed, or hopeless 1   3.  Trouble falling or staying asleep, or sleeping too much 0   4.  Feeling tired or having little energy 2   5.  Poor appetite or overeating 2   6.  Feeling bad about yourself 0   7.  Trouble concentrating 0   8.  Moving slowly or restless 0   Q9: Thoughts of better off dead/self-harm past 2 weeks 0   PHQ-9 Total Score 5   Difficulty at work, home, or with people Somewhat difficult         10/25/2024     8:11 AM   MALLORIE-7    1. Feeling nervous, anxious, or on edge 0   2. Not being able to stop or control worrying 0   3. Worrying too much about different things 1   4. Trouble relaxing 0   5. Being so restless that it is  hard to sit still 0   6. Becoming easily annoyed or irritable 0   7. Feeling afraid, as if something awful might happen 2   MALLORIE-7 Total Score 3   If you checked any problems, how difficult have they made it for you to do your work, take care of things at home, or get along with other people? Somewhat difficult       Suicide Assessment Five-step Evaluation and Treatment (SAFE-T)    How many servings of fruits and vegetables do you eat daily?  2-3  On average, how many sweetened beverages do you drink each day (Examples: soda, juice, sweet tea, etc.  Do NOT count diet or artificially sweetened beverages)?   0  How many days per week do you exercise enough to make your heart beat faster? 3 or less  How many minutes a day do you exercise enough to make your heart beat faster? 9 or less  How many days per week do you miss taking your medication? 0        Review of Systems  Constitutional, neuro, ENT, endocrine, pulmonary, cardiac, gastrointestinal, genitourinary, musculoskeletal, integument and psychiatric systems are negative, except as otherwise noted.      Objective    BP (!) 155/69   Pulse 64   Temp 97.2  F (36.2  C) (Temporal)   Resp 18   Wt 75.1 kg (165 lb 8 oz)   LMP  (LMP Unknown)   SpO2 96%   BMI 31.27 kg/m    Body mass index is 31.27 kg/m .  Physical Exam   GENERAL: alert and no distress  NECK: no adenopathy, no asymmetry, masses, or scars  RESP: lungs clear to auscultation - no rales, rhonchi or wheezes  CV: regular rate and rhythm, normal S1 S2, no S3 or S4, no murmur, click or rub, no peripheral edema  ABDOMEN: soft, nontender, no hepatosplenomegaly, no masses and bowel sounds normal  MS: no gross musculoskeletal defects noted, no edema            Signed Electronically by: Gabe Salgado MD

## 2024-11-03 DIAGNOSIS — I10 BENIGN ESSENTIAL HYPERTENSION: Chronic | ICD-10-CM

## 2024-11-05 ENCOUNTER — ANCILLARY PROCEDURE (OUTPATIENT)
Dept: ULTRASOUND IMAGING | Facility: CLINIC | Age: 74
End: 2024-11-05
Attending: INTERNAL MEDICINE
Payer: COMMERCIAL

## 2024-11-05 DIAGNOSIS — K21.00 GASTROESOPHAGEAL REFLUX DISEASE WITH ESOPHAGITIS WITHOUT HEMORRHAGE: ICD-10-CM

## 2024-11-05 PROCEDURE — 76705 ECHO EXAM OF ABDOMEN: CPT

## 2024-11-05 RX ORDER — LOSARTAN POTASSIUM 100 MG/1
100 TABLET ORAL DAILY
Qty: 90 TABLET | Refills: 3 | Status: SHIPPED | OUTPATIENT
Start: 2024-11-05

## 2024-11-20 DIAGNOSIS — K21.00 GASTROESOPHAGEAL REFLUX DISEASE WITH ESOPHAGITIS WITHOUT HEMORRHAGE: ICD-10-CM

## 2024-11-20 RX ORDER — SUCRALFATE 1 G/1
1 TABLET ORAL 4 TIMES DAILY PRN
Qty: 360 TABLET | Refills: 1 | Status: SHIPPED | OUTPATIENT
Start: 2024-11-20

## 2024-11-27 LAB — H PYLORI AG STL QL IA: NEGATIVE

## 2024-12-13 PROBLEM — K21.00 GASTROESOPHAGEAL REFLUX DISEASE WITH ESOPHAGITIS WITHOUT HEMORRHAGE: Status: ACTIVE | Noted: 2024-12-13

## 2025-01-21 DIAGNOSIS — K21.00 GASTROESOPHAGEAL REFLUX DISEASE WITH ESOPHAGITIS WITHOUT HEMORRHAGE: ICD-10-CM

## 2025-01-21 DIAGNOSIS — B36.9 FUNGAL RASH OF TORSO: ICD-10-CM

## 2025-01-21 RX ORDER — KETOCONAZOLE 20 MG/G
CREAM TOPICAL 2 TIMES DAILY
Qty: 60 G | Refills: 0 | Status: SHIPPED | OUTPATIENT
Start: 2025-01-21

## 2025-01-22 RX ORDER — OMEPRAZOLE 40 MG/1
40 CAPSULE, DELAYED RELEASE ORAL 2 TIMES DAILY
Qty: 180 CAPSULE | Refills: 1 | OUTPATIENT
Start: 2025-01-22

## 2025-05-07 DIAGNOSIS — I10 BENIGN ESSENTIAL HYPERTENSION: Chronic | ICD-10-CM

## 2025-05-08 ENCOUNTER — RESULTS FOLLOW-UP (OUTPATIENT)
Dept: INTERNAL MEDICINE | Facility: CLINIC | Age: 75
End: 2025-05-08

## 2025-05-08 ENCOUNTER — OFFICE VISIT (OUTPATIENT)
Dept: INTERNAL MEDICINE | Facility: CLINIC | Age: 75
End: 2025-05-08
Payer: COMMERCIAL

## 2025-05-08 VITALS
HEART RATE: 71 BPM | WEIGHT: 166.5 LBS | DIASTOLIC BLOOD PRESSURE: 75 MMHG | BODY MASS INDEX: 31.43 KG/M2 | SYSTOLIC BLOOD PRESSURE: 164 MMHG | OXYGEN SATURATION: 97 % | HEIGHT: 61 IN | TEMPERATURE: 96.5 F

## 2025-05-08 DIAGNOSIS — F41.0 PANIC ATTACK: ICD-10-CM

## 2025-05-08 DIAGNOSIS — N39.41 URGE INCONTINENCE OF URINE: ICD-10-CM

## 2025-05-08 DIAGNOSIS — E78.5 HYPERLIPIDEMIA WITH TARGET LDL LESS THAN 100: ICD-10-CM

## 2025-05-08 DIAGNOSIS — I10 BENIGN ESSENTIAL HYPERTENSION: Primary | ICD-10-CM

## 2025-05-08 DIAGNOSIS — E78.00 PURE HYPERCHOLESTEROLEMIA: ICD-10-CM

## 2025-05-08 DIAGNOSIS — F41.9 ANXIETY: Chronic | ICD-10-CM

## 2025-05-08 DIAGNOSIS — Z00.00 ENCOUNTER FOR MEDICARE ANNUAL WELLNESS EXAM: ICD-10-CM

## 2025-05-08 DIAGNOSIS — Z13.6 SCREENING FOR CARDIOVASCULAR CONDITION: ICD-10-CM

## 2025-05-08 DIAGNOSIS — F32.5 MAJOR DEPRESSIVE DISORDER WITH SINGLE EPISODE, IN FULL REMISSION: ICD-10-CM

## 2025-05-08 DIAGNOSIS — K21.00 GASTROESOPHAGEAL REFLUX DISEASE WITH ESOPHAGITIS WITHOUT HEMORRHAGE: ICD-10-CM

## 2025-05-08 LAB
ALBUMIN UR-MCNC: NEGATIVE MG/DL
ANION GAP SERPL CALCULATED.3IONS-SCNC: 11 MMOL/L (ref 7–15)
APPEARANCE UR: CLEAR
BACTERIA #/AREA URNS HPF: ABNORMAL /HPF
BILIRUB UR QL STRIP: NEGATIVE
BUN SERPL-MCNC: 18.9 MG/DL (ref 8–23)
CALCIUM SERPL-MCNC: 9.5 MG/DL (ref 8.8–10.4)
CHLORIDE SERPL-SCNC: 103 MMOL/L (ref 98–107)
CHOLEST SERPL-MCNC: 168 MG/DL
COLOR UR AUTO: YELLOW
CREAT SERPL-MCNC: 0.89 MG/DL (ref 0.51–0.95)
EGFRCR SERPLBLD CKD-EPI 2021: 67 ML/MIN/1.73M2
FASTING STATUS PATIENT QL REPORTED: YES
FASTING STATUS PATIENT QL REPORTED: YES
GLUCOSE SERPL-MCNC: 103 MG/DL (ref 70–99)
GLUCOSE UR STRIP-MCNC: NEGATIVE MG/DL
HCO3 SERPL-SCNC: 25 MMOL/L (ref 22–29)
HDLC SERPL-MCNC: 40 MG/DL
HGB UR QL STRIP: ABNORMAL
KETONES UR STRIP-MCNC: NEGATIVE MG/DL
LDLC SERPL CALC-MCNC: 96 MG/DL
LEUKOCYTE ESTERASE UR QL STRIP: NEGATIVE
NITRATE UR QL: NEGATIVE
NONHDLC SERPL-MCNC: 128 MG/DL
PH UR STRIP: 6 [PH] (ref 5–7)
POTASSIUM SERPL-SCNC: 4.8 MMOL/L (ref 3.4–5.3)
RBC #/AREA URNS AUTO: ABNORMAL /HPF
SODIUM SERPL-SCNC: 139 MMOL/L (ref 135–145)
SP GR UR STRIP: 1.02 (ref 1–1.03)
SQUAMOUS #/AREA URNS AUTO: ABNORMAL /LPF
TRIGL SERPL-MCNC: 159 MG/DL
TSH SERPL DL<=0.005 MIU/L-ACNC: 1.88 UIU/ML (ref 0.3–4.2)
UROBILINOGEN UR STRIP-ACNC: 0.2 E.U./DL
WBC #/AREA URNS AUTO: ABNORMAL /HPF

## 2025-05-08 RX ORDER — OMEPRAZOLE 40 MG/1
40 CAPSULE, DELAYED RELEASE ORAL 2 TIMES DAILY
Qty: 180 CAPSULE | Refills: 1 | Status: SHIPPED | OUTPATIENT
Start: 2025-05-08

## 2025-05-08 RX ORDER — AMLODIPINE BESYLATE 10 MG/1
10 TABLET ORAL EVERY EVENING
Qty: 90 TABLET | Refills: 3 | Status: SHIPPED | OUTPATIENT
Start: 2025-05-08

## 2025-05-08 RX ORDER — HYDROCHLOROTHIAZIDE 12.5 MG/1
12.5 TABLET ORAL DAILY
Qty: 90 TABLET | Refills: 0 | Status: SHIPPED | OUTPATIENT
Start: 2025-05-08

## 2025-05-08 RX ORDER — SOLIFENACIN SUCCINATE 10 MG/1
10 TABLET, FILM COATED ORAL DAILY
Qty: 90 TABLET | Refills: 0 | Status: SHIPPED | OUTPATIENT
Start: 2025-05-08

## 2025-05-08 RX ORDER — AMLODIPINE BESYLATE 10 MG/1
10 TABLET ORAL EVERY EVENING
Qty: 90 TABLET | Refills: 3 | OUTPATIENT
Start: 2025-05-08

## 2025-05-08 RX ORDER — ATORVASTATIN CALCIUM 40 MG/1
40 TABLET, FILM COATED ORAL EVERY MORNING
Qty: 90 TABLET | Refills: 3 | Status: SHIPPED | OUTPATIENT
Start: 2025-05-08

## 2025-05-08 RX ORDER — SERTRALINE HYDROCHLORIDE 100 MG/1
100 TABLET, FILM COATED ORAL DAILY
Qty: 90 TABLET | Refills: 3 | Status: SHIPPED | OUTPATIENT
Start: 2025-05-08

## 2025-05-08 SDOH — HEALTH STABILITY: PHYSICAL HEALTH: ON AVERAGE, HOW MANY MINUTES DO YOU ENGAGE IN EXERCISE AT THIS LEVEL?: 60 MIN

## 2025-05-08 SDOH — HEALTH STABILITY: PHYSICAL HEALTH: ON AVERAGE, HOW MANY DAYS PER WEEK DO YOU ENGAGE IN MODERATE TO STRENUOUS EXERCISE (LIKE A BRISK WALK)?: 4 DAYS

## 2025-05-08 ASSESSMENT — PATIENT HEALTH QUESTIONNAIRE - PHQ9
SUM OF ALL RESPONSES TO PHQ QUESTIONS 1-9: 1
10. IF YOU CHECKED OFF ANY PROBLEMS, HOW DIFFICULT HAVE THESE PROBLEMS MADE IT FOR YOU TO DO YOUR WORK, TAKE CARE OF THINGS AT HOME, OR GET ALONG WITH OTHER PEOPLE: NOT DIFFICULT AT ALL
SUM OF ALL RESPONSES TO PHQ QUESTIONS 1-9: 1

## 2025-05-08 ASSESSMENT — SOCIAL DETERMINANTS OF HEALTH (SDOH): HOW OFTEN DO YOU GET TOGETHER WITH FRIENDS OR RELATIVES?: THREE TIMES A WEEK

## 2025-05-08 NOTE — PATIENT INSTRUCTIONS
Patient Education   Preventive Care Advice   This is general advice given by our system to help you stay healthy. However, your care team may have specific advice just for you. Please talk to your care team about your preventive care needs.  Nutrition  Eat 5 or more servings of fruits and vegetables each day.  Try wheat bread, brown rice and whole grain pasta (instead of white bread, rice, and pasta).  Get enough calcium and vitamin D. Check the label on foods and aim for 100% of the RDA (recommended daily allowance).  Lifestyle  Exercise at least 150 minutes each week  (30 minutes a day, 5 days a week).  Do muscle strengthening activities 2 days a week. These help control your weight and prevent disease.  No smoking.  Wear sunscreen to prevent skin cancer.  Have a dental exam and cleaning every 6 months.  Yearly exams  See your health care team every year to talk about:  Any changes in your health.  Any medicines your care team has prescribed.  Preventive care, family planning, and ways to prevent chronic diseases.  Shots (vaccines)   HPV shots (up to age 26), if you've never had them before.  Hepatitis B shots (up to age 59), if you've never had them before.  COVID-19 shot: Get this shot when it's due.  Flu shot: Get a flu shot every year.  Tetanus shot: Get a tetanus shot every 10 years.  Pneumococcal, hepatitis A, and RSV shots: Ask your care team if you need these based on your risk.  Shingles shot (for age 50 and up)  General health tests  Diabetes screening:  Starting at age 35, Get screened for diabetes at least every 3 years.  If you are younger than age 35, ask your care team if you should be screened for diabetes.  Cholesterol test: At age 39, start having a cholesterol test every 5 years, or more often if advised.  Bone density scan (DEXA): At age 50, ask your care team if you should have this scan for osteoporosis (brittle bones).  Hepatitis C: Get tested at least once in your life.  STIs (sexually  transmitted infections)  Before age 24: Ask your care team if you should be screened for STIs.  After age 24: Get screened for STIs if you're at risk. You are at risk for STIs (including HIV) if:  You are sexually active with more than one person.  You don't use condoms every time.  You or a partner was diagnosed with a sexually transmitted infection.  If you are at risk for HIV, ask about PrEP medicine to prevent HIV.  Get tested for HIV at least once in your life, whether you are at risk for HIV or not.  Cancer screening tests  Cervical cancer screening: If you have a cervix, begin getting regular cervical cancer screening tests starting at age 21.  Breast cancer scan (mammogram): If you've ever had breasts, begin having regular mammograms starting at age 40. This is a scan to check for breast cancer.  Colon cancer screening: It is important to start screening for colon cancer at age 45.  Have a colonoscopy test every 10 years (or more often if you're at risk) Or, ask your provider about stool tests like a FIT test every year or Cologuard test every 3 years.  To learn more about your testing options, visit:   .  For help making a decision, visit:   https://bit.ly/xk09993.  Prostate cancer screening test: If you have a prostate, ask your care team if a prostate cancer screening test (PSA) at age 55 is right for you.  Lung cancer screening: If you are a current or former smoker ages 50 to 80, ask your care team if ongoing lung cancer screenings are right for you.  For informational purposes only. Not to replace the advice of your health care provider. Copyright   2023 [employer]. All rights reserved. Clinically reviewed by the [facility] Transitions Program. [employer] [id number] - REV 01/24.

## 2025-05-08 NOTE — PROGRESS NOTES
"Preventive Care Visit  [facility]  Gabe Salgado MD, Internal Medicine  May 8, 2025      Assessment & Plan       Encounter for Medicare annual wellness exam  Discussed shingles and RSV vaccine, patient declined    Benign essential hypertension  Uncontrolled,  Add hydrochlorothiazide 12.5 mg daily  Continue amlodipine and losartan  BMP ordered      Screening for cardiovascular condition  Pure hypercholesterolemia  Lipid panel ordered     - atorvastatin (LIPITOR) 40 MG tablet  Dispense: 90 tablet; Refill: 3    Anxiety  Major depressive disorder with single episode, in full remission  Stable, continue Zoloft    Gastroesophageal reflux disease with esophagitis without hemorrhage  Continue omeprazole    Urge incontinence of urine  Urinalysis ordered to rule out infection  Trial of Vesicare  Discussed physical therapy referral for bladder training, patient declined      Patient has been advised of split billing requirements and indicates understanding: Yes        BMI  Estimated body mass index is 31.46 kg/m  as calculated from the following:    Height as of this encounter: 1.549 m (5' 1\").    Weight as of this encounter: 75.5 kg (166 lb 8 oz).   Weight management plan: Discussed healthy diet and exercise guidelines    Counseling  Appropriate preventive services were addressed with this patient via screening, questionnaire, or discussion as appropriate for fall prevention, nutrition, physical activity, Tobacco-use cessation, social engagement, weight loss and cognition.  Checklist reviewing preventive services available has been given to the patient.  Reviewed patient's diet, addressing concerns and/or questions.   The patient was instructed to see the dentist every 6 months.       Follow-up  Return in about 2 months (around 7/8/2025).    The longitudinal plan of care for the diagnosis(es)/condition(s) as documented were addressed during this visit. Due to the added complexity in care, I will " continue to support Marciano in the subsequent management and with ongoing continuity of care.      Subjective   Marciano is a 74 year old, presenting for the following:  Wellness Visit  Pt has been having some pain in her hand          HPI       Home blood pressure has been running high and she is complaining of headache, she is compliant with her medications.  She is on losartan and amlodipine.      Complaining of urinary urgency for the last 2 months, denies any burning.            Advance Care Planning            5/8/2025   General Health   How would you rate your overall physical health? Good   Feel stress (tense, anxious, or unable to sleep) Not at all         5/8/2025   Nutrition   Diet: Regular (no restrictions)         5/8/2025   Exercise   Days per week of moderate/strenous exercise 4 days   Average minutes spent exercising at this level 60 min         5/8/2025   Social Factors   Frequency of gathering with friends or relatives Three times a week   Worry food won't last until get money to buy more No   Food not last or not have enough money for food? No   Do you have housing? (Housing is defined as stable permanent housing and does not include staying outside in a car, in a tent, in an abandoned building, in an overnight shelter, or couch-surfing.) Yes   Are you worried about losing your housing? No   Lack of transportation? No   Unable to get utilities (heat,electricity)? No         5/8/2025   Fall Risk   Fallen 2 or more times in the past year? No    Trouble with walking or balance? No        Proxy-reported          5/8/2025   Activities of Daily Living- Home Safety   Needs help with the following daily activites None of the above   Safety concerns in the home None of the above         5/8/2025   Dental   Dentist two times every year? (!) NO         5/8/2025   Hearing Screening   Hearing concerns? None of the above         5/8/2025   Driving Risk Screening   Patient/family members have concerns about  driving No         5/8/2025   General Alertness/Fatigue Screening   Have you been more tired than usual lately? No         5/8/2025   Urinary Incontinence Screening   Bothered by leaking urine in past 6 months No       Today's PHQ-9 Score:       5/8/2025     9:08 AM   PHQ-9 SCORE   PHQ-9 Total Score MyChart 1 (Minimal depression)   PHQ-9 Total Score 1        Proxy-reported         5/8/2025   Substance Use   Alcohol more than 3/day or more than 7/wk No   Do you have a current opioid prescription? No   How severe/bad is pain from 1 to 10? 4/10   Do you use any other substances recreationally? No     Social History     Tobacco Use    Smoking status: Never    Smokeless tobacco: Never   Vaping Use    Vaping status: Never Used   Substance Use Topics    Alcohol use: No     Alcohol/week: 0.0 standard drinks of alcohol    Drug use: No           7/19/2022   LAST FHS-7 RESULTS   1st degree relative breast or ovarian cancer No   Any relative bilateral breast cancer No   Any male have breast cancer No   Any ONE woman have BOTH breast AND ovarian cancer No   Any woman with breast cancer before 50yrs No   2 or more relatives with breast AND/OR ovarian cancer No   2 or more relatives with breast AND/OR bowel cancer No        Mammogram Screening - After age 74- determine frequency with patient based on health status, life expectancy and patient goals    ASCVD Risk   The 10-year ASCVD risk score (Jaylon RASHEED, et al., 2019) is: 34.3%    Values used to calculate the score:      Age: 74 years      Sex: Female      Is Non- : No      Diabetic: No      Tobacco smoker: No      Systolic Blood Pressure: 164 mmHg      Is BP treated: Yes      HDL Cholesterol: 40 mg/dL      Total Cholesterol: 137 mg/dL            Reviewed and updated as needed this visit by Provider                    Past Medical History:   Diagnosis Date    Anxiety     Hyperlipidaemia LDL goal < 100     Hypertension      Current providers sharing  "in care for this patient include:  Patient Care Team:  System, Provider Not In as PCP - General (Clinic)  Sara Estrada MD ([facility])  Gabe Salgado MD as Assigned PCP    The following health maintenance items are reviewed in Epic and correct as of today:  Health Maintenance   Topic Date Due    ZOSTER IMMUNIZATION (1 of 2) Never done    RSV VACCINE (1 - 1-dose 75+ series) Never done    BMP  04/25/2025    LIPID  04/25/2025    ANNUAL REVIEW OF HM ORDERS  04/25/2025    INFLUENZA VACCINE (Season Ended) 11/01/2025 (Originally 9/1/2025)    COVID-19 Vaccine (4 - 2024-25 season) 11/01/2025 (Originally 9/1/2024)    MALLORIE ASSESSMENT  10/25/2025    DTAP/TDAP/TD IMMUNIZATION (2 - Td or Tdap) 10/30/2025    MEDICARE ANNUAL WELLNESS VISIT  05/08/2026    FALL RISK ASSESSMENT  05/08/2026    PHQ-9  05/08/2026    DIABETES SCREENING  04/25/2027    ADVANCE CARE PLANNING  04/25/2029    COLORECTAL CANCER SCREENING  06/16/2032    DEXA  06/23/2037    HEPATITIS C SCREENING  Completed    Pneumococcal Vaccine: 50+ Years  Completed    DEPRESSION ACTION PLAN  Addressed    HPV IMMUNIZATION  Aged Out    MENINGITIS IMMUNIZATION  Aged Out    MAMMO SCREENING  Discontinued         Review of Systems  Constitutional, HEENT, cardiovascular, pulmonary, GI, , musculoskeletal, neuro, skin, endocrine and psych systems are negative, except as otherwise noted.     Objective    Exam  BP (!) 164/75   Pulse 71   Temp (!) 96.5  F (35.8  C) (Temporal)   Ht 1.549 m (5' 1\")   Wt 75.5 kg (166 lb 8 oz)   LMP  (LMP Unknown)   SpO2 97%   BMI 31.46 kg/m     Estimated body mass index is 31.46 kg/m  as calculated from the following:    Height as of this encounter: 1.549 m (5' 1\").    Weight as of this encounter: 75.5 kg (166 lb 8 oz).    Repeat blood pressure: 160 x 76.      Physical Exam  GENERAL: alert and no distress  EYES: Eyes grossly normal to inspection, PERRL and conjunctivae and sclerae normal  HENT: ear canals and TM's normal, " nose and mouth without ulcers or lesions  NECK: no adenopathy, no asymmetry, masses, or scars  RESP: lungs clear to auscultation - no rales, rhonchi or wheezes  CV: regular rate and rhythm, normal S1 S2, no S3 or S4, no murmur, click or rub, no peripheral edema  ABDOMEN: soft, nontender, no hepatosplenomegaly, no masses and bowel sounds normal  MS: no gross musculoskeletal defects noted, no edema  SKIN: no suspicious lesions or rashes  NEURO: Normal strength and tone, mentation intact and speech normal  PSYCH: mentation appears normal, affect normal/bright         5/8/2025   Mini Cog   Clock Draw Score 2 Normal   3 Item Recall 3 objects recalled   Mini Cog Total Score 5              Signed Electronically by: Gabe Salgado MD    Answers submitted by the patient for this visit:  Patient Health Questionnaire (Submitted on 5/8/2025)  If you checked off any problems, how difficult have these problems made it for you to do your work, take care of things at home, or get along with other people?: Not difficult at all  PHQ9 TOTAL SCORE: 1

## 2025-07-15 ENCOUNTER — OFFICE VISIT (OUTPATIENT)
Dept: INTERNAL MEDICINE | Facility: CLINIC | Age: 75
End: 2025-07-15
Payer: COMMERCIAL

## 2025-07-15 VITALS
HEIGHT: 61 IN | HEART RATE: 74 BPM | SYSTOLIC BLOOD PRESSURE: 138 MMHG | BODY MASS INDEX: 30.51 KG/M2 | RESPIRATION RATE: 16 BRPM | WEIGHT: 161.6 LBS | OXYGEN SATURATION: 95 % | DIASTOLIC BLOOD PRESSURE: 76 MMHG

## 2025-07-15 DIAGNOSIS — A08.4 VIRAL GASTROENTERITIS: Primary | ICD-10-CM

## 2025-07-15 DIAGNOSIS — I10 BENIGN ESSENTIAL HYPERTENSION: Chronic | ICD-10-CM

## 2025-07-15 DIAGNOSIS — R68.2 DRY MOUTH: ICD-10-CM

## 2025-07-15 LAB
ALBUMIN SERPL BCG-MCNC: 4.5 G/DL (ref 3.5–5.2)
ALP SERPL-CCNC: 109 U/L (ref 40–150)
ALT SERPL W P-5'-P-CCNC: 12 U/L (ref 0–50)
ANION GAP SERPL CALCULATED.3IONS-SCNC: 13 MMOL/L (ref 7–15)
AST SERPL W P-5'-P-CCNC: 22 U/L (ref 0–45)
BILIRUB SERPL-MCNC: 0.8 MG/DL
BUN SERPL-MCNC: 14.5 MG/DL (ref 8–23)
CALCIUM SERPL-MCNC: 9.7 MG/DL (ref 8.8–10.4)
CHLORIDE SERPL-SCNC: 103 MMOL/L (ref 98–107)
CREAT SERPL-MCNC: 0.86 MG/DL (ref 0.51–0.95)
EGFRCR SERPLBLD CKD-EPI 2021: 69 ML/MIN/1.73M2
ERYTHROCYTE [DISTWIDTH] IN BLOOD BY AUTOMATED COUNT: 13.1 % (ref 10–15)
GLUCOSE SERPL-MCNC: 95 MG/DL (ref 70–99)
HCO3 SERPL-SCNC: 25 MMOL/L (ref 22–29)
HCT VFR BLD AUTO: 41 % (ref 35–47)
HGB BLD-MCNC: 14.4 G/DL (ref 11.7–15.7)
MCH RBC QN AUTO: 28.9 PG (ref 26.5–33)
MCHC RBC AUTO-ENTMCNC: 35.1 G/DL (ref 31.5–36.5)
MCV RBC AUTO: 82 FL (ref 78–100)
PLATELET # BLD AUTO: 246 10E3/UL (ref 150–450)
POTASSIUM SERPL-SCNC: 4 MMOL/L (ref 3.4–5.3)
PROT SERPL-MCNC: 7.9 G/DL (ref 6.4–8.3)
RBC # BLD AUTO: 4.99 10E6/UL (ref 3.8–5.2)
SODIUM SERPL-SCNC: 141 MMOL/L (ref 135–145)
WBC # BLD AUTO: 7.8 10E3/UL (ref 4–11)

## 2025-07-15 PROCEDURE — 99213 OFFICE O/P EST LOW 20 MIN: CPT | Performed by: INTERNAL MEDICINE

## 2025-07-15 PROCEDURE — 36415 COLL VENOUS BLD VENIPUNCTURE: CPT | Performed by: INTERNAL MEDICINE

## 2025-07-15 PROCEDURE — 3075F SYST BP GE 130 - 139MM HG: CPT | Performed by: INTERNAL MEDICINE

## 2025-07-15 PROCEDURE — 3078F DIAST BP <80 MM HG: CPT | Performed by: INTERNAL MEDICINE

## 2025-07-15 PROCEDURE — 85027 COMPLETE CBC AUTOMATED: CPT | Performed by: INTERNAL MEDICINE

## 2025-07-15 PROCEDURE — G2211 COMPLEX E/M VISIT ADD ON: HCPCS | Performed by: INTERNAL MEDICINE

## 2025-07-15 PROCEDURE — 80053 COMPREHEN METABOLIC PANEL: CPT | Performed by: INTERNAL MEDICINE

## 2025-07-15 NOTE — PROGRESS NOTES
"  Assessment & Plan     Viral gastroenteritis  Resolved, check CBC and CMP    Dry mouth  Continue to hold the Vesicare    Benign essential hypertension  Controlled, continue current medications      Follow-up  Return in about 4 weeks (around 8/12/2025).    The longitudinal plan of care for the diagnosis(es)/condition(s) as documented were addressed during this visit. Due to the added complexity in care, I will continue to support Marciano in the subsequent management and with ongoing continuity of care.      Subjective   Marciano is a 75 year old, presenting for the following health issues:  Follow Up    HPI        Complaining of dry mouth.  Had vomiting and diarrhea for 3 days recently.  Denies any abdominal pain or rectal bleeding.    Was also started on Vesicare recently which she has stopped due to dry mouth.    Complaining of decreased appetite and weight loss.        Review of Systems  Constitutional, neuro, ENT, endocrine, pulmonary, cardiac, gastrointestinal, genitourinary, musculoskeletal, integument and psychiatric systems are negative, except as otherwise noted.      Objective    /76   Pulse 74   Resp 16   Ht 1.549 m (5' 1\")   Wt 73.3 kg (161 lb 9.6 oz)   LMP  (LMP Unknown)   SpO2 95%   BMI 30.53 kg/m    Body mass index is 30.53 kg/m .  Physical Exam   GENERAL: alert and no distress  NECK: no adenopathy, no asymmetry, masses, or scars  RESP: lungs clear to auscultation - no rales, rhonchi or wheezes  CV: regular rate and rhythm, normal S1 S2, no S3 or S4, no murmur, click or rub, no peripheral edema  ABDOMEN: soft, nontender, no hepatosplenomegaly, no masses and bowel sounds normal  MS: no gross musculoskeletal defects noted, no edema            Signed Electronically by: Gabe Salgado MD    "

## 2025-08-03 DIAGNOSIS — I10 BENIGN ESSENTIAL HYPERTENSION: ICD-10-CM

## 2025-08-03 DIAGNOSIS — N39.41 URGE INCONTINENCE OF URINE: ICD-10-CM

## 2025-08-04 RX ORDER — SOLIFENACIN SUCCINATE 10 MG/1
10 TABLET, FILM COATED ORAL DAILY
Qty: 90 TABLET | Refills: 2 | Status: SHIPPED | OUTPATIENT
Start: 2025-08-04

## 2025-08-04 RX ORDER — HYDROCHLOROTHIAZIDE 12.5 MG/1
12.5 TABLET ORAL DAILY
Qty: 90 TABLET | Refills: 2 | Status: SHIPPED | OUTPATIENT
Start: 2025-08-04

## 2025-08-18 ENCOUNTER — ANCILLARY PROCEDURE (OUTPATIENT)
Dept: CT IMAGING | Facility: CLINIC | Age: 75
End: 2025-08-18
Attending: INTERNAL MEDICINE
Payer: COMMERCIAL

## 2025-08-18 DIAGNOSIS — R63.4 WEIGHT LOSS: ICD-10-CM

## 2025-08-18 LAB
CREAT BLD-MCNC: 0.9 MG/DL (ref 0.5–1)
EGFRCR SERPLBLD CKD-EPI 2021: >60 ML/MIN/1.73M2

## 2025-08-18 PROCEDURE — 250N000011 HC RX IP 250 OP 636: Performed by: INTERNAL MEDICINE

## 2025-08-18 PROCEDURE — 71260 CT THORAX DX C+: CPT

## 2025-08-18 PROCEDURE — 250N000009 HC RX 250: Performed by: INTERNAL MEDICINE

## 2025-08-18 PROCEDURE — 82565 ASSAY OF CREATININE: CPT

## 2025-08-18 RX ORDER — IOPAMIDOL 755 MG/ML
81 INJECTION, SOLUTION INTRAVASCULAR ONCE
Status: COMPLETED | OUTPATIENT
Start: 2025-08-18 | End: 2025-08-18

## 2025-08-18 RX ADMIN — SODIUM CHLORIDE 45 ML: 9 INJECTION, SOLUTION INTRAVENOUS at 13:15

## 2025-08-18 RX ADMIN — IOPAMIDOL 81 ML: 755 INJECTION, SOLUTION INTRAVENOUS at 13:15

## 2025-08-19 ENCOUNTER — TELEPHONE (OUTPATIENT)
Dept: GASTROENTEROLOGY | Facility: CLINIC | Age: 75
End: 2025-08-19
Payer: COMMERCIAL

## (undated) RX ORDER — FENTANYL CITRATE 50 UG/ML
INJECTION, SOLUTION INTRAMUSCULAR; INTRAVENOUS
Status: DISPENSED
Start: 2024-09-03